# Patient Record
Sex: MALE | Race: WHITE | NOT HISPANIC OR LATINO | Employment: OTHER | URBAN - METROPOLITAN AREA
[De-identification: names, ages, dates, MRNs, and addresses within clinical notes are randomized per-mention and may not be internally consistent; named-entity substitution may affect disease eponyms.]

---

## 2020-06-02 ENCOUNTER — HISTORICAL (OUTPATIENT)
Dept: ADMINISTRATIVE | Facility: HOSPITAL | Age: 45
End: 2020-06-02

## 2020-06-19 ENCOUNTER — HISTORICAL (OUTPATIENT)
Dept: ADMINISTRATIVE | Facility: HOSPITAL | Age: 45
End: 2020-06-19

## 2022-04-07 ENCOUNTER — HISTORICAL (OUTPATIENT)
Dept: ADMINISTRATIVE | Facility: HOSPITAL | Age: 47
End: 2022-04-07

## 2022-04-23 VITALS
WEIGHT: 184.94 LBS | BODY MASS INDEX: 32.77 KG/M2 | DIASTOLIC BLOOD PRESSURE: 76 MMHG | SYSTOLIC BLOOD PRESSURE: 120 MMHG | HEIGHT: 63 IN

## 2023-05-05 ENCOUNTER — HOSPITAL ENCOUNTER (INPATIENT)
Facility: HOSPITAL | Age: 48
LOS: 4 days | Discharge: HOME | DRG: 683 | End: 2023-05-12
Attending: FAMILY MEDICINE | Admitting: INTERNAL MEDICINE
Payer: MEDICARE

## 2023-05-05 DIAGNOSIS — D53.9 MACROCYTIC ANEMIA: ICD-10-CM

## 2023-05-05 DIAGNOSIS — N17.9 AKI (ACUTE KIDNEY INJURY): ICD-10-CM

## 2023-05-05 DIAGNOSIS — D64.9 ANEMIA, UNSPECIFIED TYPE: Primary | ICD-10-CM

## 2023-05-05 LAB
ALBUMIN SERPL-MCNC: 2.9 G/DL (ref 3.5–5)
ALBUMIN/GLOB SERPL: 0.9 RATIO (ref 1.1–2)
ALP SERPL-CCNC: 63 UNIT/L (ref 40–150)
ALT SERPL-CCNC: 28 UNIT/L (ref 0–55)
AMMONIA PLAS-MSCNC: 48.8 UMOL/L (ref 18–72)
APPEARANCE UR: CLEAR
APTT PPP: 30.2 SECONDS (ref 23.4–33.9)
AST SERPL-CCNC: 74 UNIT/L (ref 5–34)
BACTERIA #/AREA URNS AUTO: ABNORMAL /HPF
BASOPHILS # BLD AUTO: 0.01 X10(3)/MCL
BASOPHILS NFR BLD AUTO: 0.2 %
BILIRUB UR QL STRIP.AUTO: NEGATIVE MG/DL
BILIRUBIN DIRECT+TOT PNL SERPL-MCNC: 0.3 MG/DL
BNP BLD-MCNC: 38.6 PG/ML
BUN SERPL-MCNC: 27.9 MG/DL (ref 8.9–20.6)
CALCIUM SERPL-MCNC: 9.5 MG/DL (ref 8.4–10.2)
CHLORIDE SERPL-SCNC: 114 MMOL/L (ref 98–107)
CK SERPL-CCNC: 2035 U/L (ref 30–200)
CO2 SERPL-SCNC: 23 MMOL/L (ref 22–29)
COLOR UR AUTO: YELLOW
CREAT SERPL-MCNC: 2.44 MG/DL (ref 0.73–1.18)
CREAT UR-MCNC: 93.2 MG/DL (ref 63–166)
CREAT UR-MCNC: 95.1 MG/DL (ref 63–166)
DEPRECATED CALCIDIOL+CALCIFEROL SERPL-MC: 85.2 NG/ML (ref 30–80)
EOSINOPHIL # BLD AUTO: 0.14 X10(3)/MCL (ref 0–0.9)
EOSINOPHIL NFR BLD AUTO: 3.1 %
ERYTHROCYTE [DISTWIDTH] IN BLOOD BY AUTOMATED COUNT: 15.9 % (ref 11.5–17)
GFR SERPLBLD CREATININE-BSD FMLA CKD-EPI: 32 MLS/MIN/1.73/M2
GLOBULIN SER-MCNC: 3.2 GM/DL (ref 2.4–3.5)
GLUCOSE SERPL-MCNC: 129 MG/DL (ref 74–100)
GLUCOSE UR QL STRIP.AUTO: 100 MG/DL
HCT VFR BLD AUTO: 28.6 % (ref 42–52)
HEMOCCULT SP1 STL QL: NEGATIVE
HGB BLD-MCNC: 8.8 G/DL (ref 14–18)
IMM GRANULOCYTES # BLD AUTO: 0.01 X10(3)/MCL (ref 0–0.04)
IMM GRANULOCYTES NFR BLD AUTO: 0.2 %
INR BLD: 1.07 (ref 2–3)
IRON SATN MFR SERPL: 16 % (ref 20–50)
IRON SERPL-MCNC: 60 UG/DL (ref 65–175)
KETONES UR QL STRIP.AUTO: NEGATIVE MG/DL
LDH SERPL-CCNC: 266 U/L (ref 125–220)
LEUKOCYTE ESTERASE UR QL STRIP.AUTO: NEGATIVE UNIT/L
LYMPHOCYTES # BLD AUTO: 1.95 X10(3)/MCL (ref 0.6–4.6)
LYMPHOCYTES NFR BLD AUTO: 43.4 %
MCH RBC QN AUTO: 31.8 PG (ref 27–31)
MCHC RBC AUTO-ENTMCNC: 30.8 G/DL (ref 33–36)
MCV RBC AUTO: 103.2 FL (ref 80–94)
MONOCYTES # BLD AUTO: 0.32 X10(3)/MCL (ref 0.1–1.3)
MONOCYTES NFR BLD AUTO: 7.1 %
MUCOUS THREADS URNS QL MICRO: ABNORMAL /LPF
NEUTROPHILS # BLD AUTO: 2.06 X10(3)/MCL (ref 2.1–9.2)
NEUTROPHILS NFR BLD AUTO: 46 %
NITRITE UR QL STRIP.AUTO: NEGATIVE
NRBC BLD AUTO-RTO: 0 %
PH UR STRIP.AUTO: 6 [PH]
PLATELET # BLD AUTO: 228 X10(3)/MCL (ref 130–400)
PLATELETS.RETICULATED NFR BLD AUTO: 1.8 % (ref 0.9–11.2)
PMV BLD AUTO: 9.9 FL (ref 7.4–10.4)
POTASSIUM SERPL-SCNC: 5.3 MMOL/L (ref 3.5–5.1)
PROT SERPL-MCNC: 6.1 GM/DL (ref 6.4–8.3)
PROT UR QL STRIP.AUTO: NEGATIVE MG/DL
PROT UR STRIP-MCNC: 12.4 MG/DL
PROTHROMBIN TIME: 13.7 SECONDS (ref 11.7–14.5)
PTH-INTACT SERPL-MCNC: 41.5 PG/ML (ref 8.7–77)
RBC # BLD AUTO: 2.77 X10(6)/MCL (ref 4.7–6.1)
RBC #/AREA URNS AUTO: ABNORMAL /HPF
RBC UR QL AUTO: ABNORMAL UNIT/L
RET# (OHS): 0.04 (ref 0.03–0.1)
RETICULOCYTE COUNT AUTOMATED (OLG): 1.67 % (ref 1.1–2.1)
SODIUM SERPL-SCNC: 145 MMOL/L (ref 136–145)
SODIUM UR-SCNC: 74 MMOL/L
SP GR UR STRIP.AUTO: 1.01
SQUAMOUS #/AREA URNS AUTO: ABNORMAL /HPF
TIBC SERPL-MCNC: 308 UG/DL (ref 69–240)
TIBC SERPL-MCNC: 368 UG/DL (ref 250–450)
TRANSFERRIN SERPL-MCNC: 329 MG/DL (ref 174–364)
URINE PROTEIN/CREATININE RATIO (OHS): 0.1
UROBILINOGEN UR STRIP-ACNC: 1 MG/DL
WBC # SPEC AUTO: 4.49 X10(3)/MCL (ref 4.5–11.5)
WBC #/AREA URNS AUTO: ABNORMAL /HPF

## 2023-05-05 PROCEDURE — 82728 ASSAY OF FERRITIN: CPT | Performed by: FAMILY MEDICINE

## 2023-05-05 PROCEDURE — 82746 ASSAY OF FOLIC ACID SERUM: CPT | Performed by: FAMILY MEDICINE

## 2023-05-05 PROCEDURE — 81001 URINALYSIS AUTO W/SCOPE: CPT | Performed by: PHYSICIAN ASSISTANT

## 2023-05-05 PROCEDURE — 63600175 PHARM REV CODE 636 W HCPCS: Performed by: INTERNAL MEDICINE

## 2023-05-05 PROCEDURE — 82306 VITAMIN D 25 HYDROXY: CPT | Performed by: INTERNAL MEDICINE

## 2023-05-05 PROCEDURE — 85610 PROTHROMBIN TIME: CPT | Performed by: PHYSICIAN ASSISTANT

## 2023-05-05 PROCEDURE — 83615 LACTATE (LD) (LDH) ENZYME: CPT | Performed by: FAMILY MEDICINE

## 2023-05-05 PROCEDURE — 82270 OCCULT BLOOD FECES: CPT | Performed by: PHYSICIAN ASSISTANT

## 2023-05-05 PROCEDURE — G0378 HOSPITAL OBSERVATION PER HR: HCPCS

## 2023-05-05 PROCEDURE — 25000003 PHARM REV CODE 250: Performed by: INTERNAL MEDICINE

## 2023-05-05 PROCEDURE — 83880 ASSAY OF NATRIURETIC PEPTIDE: CPT | Performed by: PHYSICIAN ASSISTANT

## 2023-05-05 PROCEDURE — 25000003 PHARM REV CODE 250: Performed by: PHYSICIAN ASSISTANT

## 2023-05-05 PROCEDURE — 85045 AUTOMATED RETICULOCYTE COUNT: CPT | Performed by: FAMILY MEDICINE

## 2023-05-05 PROCEDURE — 82140 ASSAY OF AMMONIA: CPT | Performed by: FAMILY MEDICINE

## 2023-05-05 PROCEDURE — 83970 ASSAY OF PARATHORMONE: CPT | Performed by: INTERNAL MEDICINE

## 2023-05-05 PROCEDURE — 99285 EMERGENCY DEPT VISIT HI MDM: CPT | Mod: 25

## 2023-05-05 PROCEDURE — 85025 COMPLETE CBC W/AUTO DIFF WBC: CPT | Performed by: STUDENT IN AN ORGANIZED HEALTH CARE EDUCATION/TRAINING PROGRAM

## 2023-05-05 PROCEDURE — 80053 COMPREHEN METABOLIC PANEL: CPT | Performed by: STUDENT IN AN ORGANIZED HEALTH CARE EDUCATION/TRAINING PROGRAM

## 2023-05-05 PROCEDURE — 82570 ASSAY OF URINE CREATININE: CPT | Performed by: INTERNAL MEDICINE

## 2023-05-05 PROCEDURE — 80074 ACUTE HEPATITIS PANEL: CPT | Performed by: INTERNAL MEDICINE

## 2023-05-05 PROCEDURE — 82570 ASSAY OF URINE CREATININE: CPT | Mod: 91 | Performed by: INTERNAL MEDICINE

## 2023-05-05 PROCEDURE — 83550 IRON BINDING TEST: CPT | Performed by: FAMILY MEDICINE

## 2023-05-05 PROCEDURE — 82607 VITAMIN B-12: CPT | Performed by: FAMILY MEDICINE

## 2023-05-05 PROCEDURE — 87389 HIV-1 AG W/HIV-1&-2 AB AG IA: CPT | Performed by: INTERNAL MEDICINE

## 2023-05-05 PROCEDURE — 85730 THROMBOPLASTIN TIME PARTIAL: CPT | Performed by: PHYSICIAN ASSISTANT

## 2023-05-05 PROCEDURE — 84300 ASSAY OF URINE SODIUM: CPT | Performed by: INTERNAL MEDICINE

## 2023-05-05 PROCEDURE — 86880 COOMBS TEST DIRECT: CPT | Performed by: FAMILY MEDICINE

## 2023-05-05 PROCEDURE — 96360 HYDRATION IV INFUSION INIT: CPT

## 2023-05-05 PROCEDURE — 82550 ASSAY OF CK (CPK): CPT | Performed by: PHYSICIAN ASSISTANT

## 2023-05-05 RX ORDER — SODIUM CHLORIDE 0.9 % (FLUSH) 0.9 %
10 SYRINGE (ML) INJECTION
Status: DISCONTINUED | OUTPATIENT
Start: 2023-05-05 | End: 2023-05-12 | Stop reason: HOSPADM

## 2023-05-05 RX ORDER — FUROSEMIDE 40 MG/1
TABLET ORAL
Status: ON HOLD | COMMUNITY
End: 2023-05-12 | Stop reason: SDUPTHER

## 2023-05-05 RX ORDER — ATORVASTATIN CALCIUM 20 MG/1
TABLET, FILM COATED ORAL
COMMUNITY

## 2023-05-05 RX ORDER — URSODIOL 300 MG/1
CAPSULE ORAL 3 TIMES DAILY
COMMUNITY
Start: 2023-04-19

## 2023-05-05 RX ORDER — SODIUM CHLORIDE, SODIUM LACTATE, POTASSIUM CHLORIDE, CALCIUM CHLORIDE 600; 310; 30; 20 MG/100ML; MG/100ML; MG/100ML; MG/100ML
INJECTION, SOLUTION INTRAVENOUS CONTINUOUS
Status: DISCONTINUED | OUTPATIENT
Start: 2023-05-05 | End: 2023-05-06

## 2023-05-05 RX ORDER — PALIPERIDONE PALMITATE 117 MG/.75ML
INJECTION INTRAMUSCULAR
COMMUNITY

## 2023-05-05 RX ORDER — TALC
6 POWDER (GRAM) TOPICAL NIGHTLY PRN
Status: DISCONTINUED | OUTPATIENT
Start: 2023-05-05 | End: 2023-05-12 | Stop reason: HOSPADM

## 2023-05-05 RX ORDER — ATORVASTATIN CALCIUM 10 MG/1
10 TABLET, FILM COATED ORAL
Status: ON HOLD | COMMUNITY
Start: 2023-04-19 | End: 2023-05-12 | Stop reason: HOSPADM

## 2023-05-05 RX ORDER — ARIPIPRAZOLE 5 MG/1
5 TABLET ORAL
COMMUNITY
Start: 2023-04-19

## 2023-05-05 RX ORDER — LEVOTHYROXINE SODIUM 25 UG/1
25 TABLET ORAL
Status: DISCONTINUED | OUTPATIENT
Start: 2023-05-06 | End: 2023-05-12 | Stop reason: HOSPADM

## 2023-05-05 RX ORDER — FENOFIBRATE 145 MG/1
TABLET, FILM COATED ORAL
COMMUNITY

## 2023-05-05 RX ORDER — METFORMIN HYDROCHLORIDE 1000 MG/1
1000 TABLET ORAL 2 TIMES DAILY
Status: ON HOLD | COMMUNITY
Start: 2023-04-19 | End: 2023-05-12 | Stop reason: HOSPADM

## 2023-05-05 RX ORDER — PANTOPRAZOLE SODIUM 40 MG/1
40 TABLET, DELAYED RELEASE ORAL
COMMUNITY
Start: 2023-04-24

## 2023-05-05 RX ORDER — LEMBOREXANT 5 MG/1
1 TABLET, FILM COATED ORAL
COMMUNITY
Start: 2023-04-19

## 2023-05-05 RX ORDER — GLIPIZIDE 10 MG/1
10 TABLET, FILM COATED, EXTENDED RELEASE ORAL
Status: ON HOLD | COMMUNITY
Start: 2023-04-19 | End: 2023-05-12 | Stop reason: SDUPTHER

## 2023-05-05 RX ORDER — ARIPIPRAZOLE 5 MG/1
5 TABLET ORAL DAILY
Status: DISCONTINUED | OUTPATIENT
Start: 2023-05-06 | End: 2023-05-12 | Stop reason: HOSPADM

## 2023-05-05 RX ORDER — DICLOFENAC SODIUM 75 MG/1
75 TABLET, DELAYED RELEASE ORAL 2 TIMES DAILY
Status: ON HOLD | COMMUNITY
Start: 2023-04-19 | End: 2023-05-12 | Stop reason: HOSPADM

## 2023-05-05 RX ORDER — VORTIOXETINE 20 MG/1
1 TABLET, FILM COATED ORAL
COMMUNITY
Start: 2023-04-19

## 2023-05-05 RX ORDER — ASPIRIN 81 MG/1
81 TABLET ORAL DAILY
Status: DISCONTINUED | OUTPATIENT
Start: 2023-05-06 | End: 2023-05-06

## 2023-05-05 RX ORDER — FENOFIBRATE 145 MG/1
145 TABLET, FILM COATED ORAL DAILY
Status: DISCONTINUED | OUTPATIENT
Start: 2023-05-06 | End: 2023-05-09

## 2023-05-05 RX ORDER — LEVOTHYROXINE SODIUM 25 UG/1
TABLET ORAL
COMMUNITY

## 2023-05-05 RX ORDER — ASPIRIN 81 MG/1
TABLET ORAL
COMMUNITY

## 2023-05-05 RX ADMIN — SODIUM ZIRCONIUM CYCLOSILICATE 10 G: 10 POWDER, FOR SUSPENSION ORAL at 09:05

## 2023-05-05 RX ADMIN — SODIUM CHLORIDE 1000 ML: 9 INJECTION, SOLUTION INTRAVENOUS at 07:05

## 2023-05-05 RX ADMIN — SODIUM CHLORIDE, POTASSIUM CHLORIDE, SODIUM LACTATE AND CALCIUM CHLORIDE: 600; 310; 30; 20 INJECTION, SOLUTION INTRAVENOUS at 09:05

## 2023-05-05 NOTE — Clinical Note
Diagnosis: Anemia, unspecified type [[id number]]  Future Attending Provider: RUBÉN VASQUEZ [[id number]]  Admitting Provider:: RUBÉN AVSQUEZ [[id number]]
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
## 2023-05-06 LAB
ALBUMIN SERPL-MCNC: 2.7 G/DL (ref 3.5–5)
ALBUMIN/GLOB SERPL: 0.9 RATIO (ref 1.1–2)
ALP SERPL-CCNC: 54 UNIT/L (ref 40–150)
ALT SERPL-CCNC: 27 UNIT/L (ref 0–55)
AST SERPL-CCNC: 74 UNIT/L (ref 5–34)
BASOPHILS # BLD AUTO: 0.01 X10(3)/MCL
BASOPHILS NFR BLD AUTO: 0.2 %
BILIRUBIN DIRECT+TOT PNL SERPL-MCNC: 0.3 MG/DL
BUN SERPL-MCNC: 28.3 MG/DL (ref 8.9–20.6)
CALCIUM SERPL-MCNC: 8.8 MG/DL (ref 8.4–10.2)
CHLORIDE SERPL-SCNC: 113 MMOL/L (ref 98–107)
CK SERPL-CCNC: 1631 U/L (ref 30–200)
CO2 SERPL-SCNC: 26 MMOL/L (ref 22–29)
CREAT SERPL-MCNC: 2.16 MG/DL (ref 0.73–1.18)
DIRECT COOMBS (DAT): NEGATIVE
EOSINOPHIL # BLD AUTO: 0.11 X10(3)/MCL (ref 0–0.9)
EOSINOPHIL NFR BLD AUTO: 2.5 %
ERYTHROCYTE [DISTWIDTH] IN BLOOD BY AUTOMATED COUNT: 16.1 % (ref 11.5–17)
FERRITIN SERPL-MCNC: 220.69 NG/ML (ref 21.81–274.66)
FOLATE SERPL-MCNC: 6.3 NG/ML (ref 7–31.4)
GFR SERPLBLD CREATININE-BSD FMLA CKD-EPI: 38 MLS/MIN/1.73/M2
GGT SERPL-CCNC: 16 U/L (ref 12–64)
GLOBULIN SER-MCNC: 2.9 GM/DL (ref 2.4–3.5)
GLUCOSE SERPL-MCNC: 46 MG/DL (ref 74–100)
HAV IGM SERPL QL IA: NONREACTIVE
HBV CORE IGM SERPL QL IA: NONREACTIVE
HBV SURFACE AG SERPL QL IA: NONREACTIVE
HCT VFR BLD AUTO: 26.9 % (ref 42–52)
HCV AB SERPL QL IA: NONREACTIVE
HGB BLD-MCNC: 8.1 G/DL (ref 14–18)
HIV 1+2 AB+HIV1 P24 AG SERPL QL IA: NONREACTIVE
IMM GRANULOCYTES # BLD AUTO: 0.01 X10(3)/MCL (ref 0–0.04)
IMM GRANULOCYTES NFR BLD AUTO: 0.2 %
LYMPHOCYTES # BLD AUTO: 2.09 X10(3)/MCL (ref 0.6–4.6)
LYMPHOCYTES NFR BLD AUTO: 46.9 %
MAGNESIUM SERPL-MCNC: 1.5 MG/DL (ref 1.6–2.6)
MCH RBC QN AUTO: 31.5 PG (ref 27–31)
MCHC RBC AUTO-ENTMCNC: 30.1 G/DL (ref 33–36)
MCV RBC AUTO: 104.7 FL (ref 80–94)
MONOCYTES # BLD AUTO: 0.31 X10(3)/MCL (ref 0.1–1.3)
MONOCYTES NFR BLD AUTO: 7 %
NEUTROPHILS # BLD AUTO: 1.93 X10(3)/MCL (ref 2.1–9.2)
NEUTROPHILS NFR BLD AUTO: 43.2 %
NRBC BLD AUTO-RTO: 0 %
PHOSPHATE SERPL-MCNC: 3.7 MG/DL (ref 2.3–4.7)
PLATELET # BLD AUTO: 234 X10(3)/MCL (ref 130–400)
PMV BLD AUTO: 9.4 FL (ref 7.4–10.4)
POCT GLUCOSE: 147 MG/DL (ref 70–110)
POCT GLUCOSE: 147 MG/DL (ref 70–110)
POCT GLUCOSE: 227 MG/DL (ref 70–110)
POCT GLUCOSE: 57 MG/DL (ref 70–110)
POCT GLUCOSE: 81 MG/DL (ref 70–110)
POCT GLUCOSE: 84 MG/DL (ref 70–110)
POTASSIUM SERPL-SCNC: 4.5 MMOL/L (ref 3.5–5.1)
PROT SERPL-MCNC: 5.6 GM/DL (ref 6.4–8.3)
RBC # BLD AUTO: 2.57 X10(6)/MCL (ref 4.7–6.1)
SODIUM SERPL-SCNC: 147 MMOL/L (ref 136–145)
T4 FREE SERPL-MCNC: 0.76 NG/DL (ref 0.7–1.48)
TSH SERPL-ACNC: 0.74 UIU/ML (ref 0.35–4.94)
VIT B12 SERPL-MCNC: 309 PG/ML (ref 213–816)
WBC # SPEC AUTO: 4.46 X10(3)/MCL (ref 4.5–11.5)

## 2023-05-06 PROCEDURE — 25000003 PHARM REV CODE 250: Performed by: INTERNAL MEDICINE

## 2023-05-06 PROCEDURE — 83735 ASSAY OF MAGNESIUM: CPT | Performed by: INTERNAL MEDICINE

## 2023-05-06 PROCEDURE — G0378 HOSPITAL OBSERVATION PER HR: HCPCS

## 2023-05-06 PROCEDURE — 84439 ASSAY OF FREE THYROXINE: CPT | Performed by: INTERNAL MEDICINE

## 2023-05-06 PROCEDURE — 82977 ASSAY OF GGT: CPT | Performed by: INTERNAL MEDICINE

## 2023-05-06 PROCEDURE — 96372 THER/PROPH/DIAG INJ SC/IM: CPT | Performed by: INTERNAL MEDICINE

## 2023-05-06 PROCEDURE — 82550 ASSAY OF CK (CPK): CPT | Performed by: INTERNAL MEDICINE

## 2023-05-06 PROCEDURE — 84100 ASSAY OF PHOSPHORUS: CPT | Performed by: INTERNAL MEDICINE

## 2023-05-06 PROCEDURE — 80053 COMPREHEN METABOLIC PANEL: CPT | Performed by: INTERNAL MEDICINE

## 2023-05-06 PROCEDURE — 85025 COMPLETE CBC W/AUTO DIFF WBC: CPT | Performed by: INTERNAL MEDICINE

## 2023-05-06 PROCEDURE — 63600175 PHARM REV CODE 636 W HCPCS: Performed by: INTERNAL MEDICINE

## 2023-05-06 PROCEDURE — 84443 ASSAY THYROID STIM HORMONE: CPT | Performed by: INTERNAL MEDICINE

## 2023-05-06 RX ORDER — DEXTROSE MONOHYDRATE 50 MG/ML
INJECTION, SOLUTION INTRAVENOUS CONTINUOUS
Status: DISCONTINUED | OUTPATIENT
Start: 2023-05-07 | End: 2023-05-07

## 2023-05-06 RX ORDER — IBUPROFEN 200 MG
16 TABLET ORAL
Status: DISCONTINUED | OUTPATIENT
Start: 2023-05-06 | End: 2023-05-12 | Stop reason: HOSPADM

## 2023-05-06 RX ORDER — IBUPROFEN 200 MG
16 TABLET ORAL
Status: DISCONTINUED | OUTPATIENT
Start: 2023-05-06 | End: 2023-05-06

## 2023-05-06 RX ORDER — CYANOCOBALAMIN 1000 UG/ML
1000 INJECTION, SOLUTION INTRAMUSCULAR; SUBCUTANEOUS ONCE
Status: COMPLETED | OUTPATIENT
Start: 2023-05-06 | End: 2023-05-06

## 2023-05-06 RX ORDER — IBUPROFEN 200 MG
24 TABLET ORAL
Status: DISCONTINUED | OUTPATIENT
Start: 2023-05-06 | End: 2023-05-06

## 2023-05-06 RX ORDER — ALFUZOSIN HYDROCHLORIDE 10 MG/1
10 TABLET, EXTENDED RELEASE ORAL
COMMUNITY

## 2023-05-06 RX ORDER — LANOLIN ALCOHOL/MO/W.PET/CERES
1 CREAM (GRAM) TOPICAL DAILY
Status: DISCONTINUED | OUTPATIENT
Start: 2023-05-06 | End: 2023-05-12 | Stop reason: HOSPADM

## 2023-05-06 RX ORDER — FOLIC ACID 1 MG/1
1 TABLET ORAL DAILY
Status: DISCONTINUED | OUTPATIENT
Start: 2023-05-06 | End: 2023-05-12 | Stop reason: HOSPADM

## 2023-05-06 RX ORDER — INSULIN ASPART 100 [IU]/ML
0-5 INJECTION, SOLUTION INTRAVENOUS; SUBCUTANEOUS
Status: DISCONTINUED | OUTPATIENT
Start: 2023-05-06 | End: 2023-05-12 | Stop reason: HOSPADM

## 2023-05-06 RX ORDER — DIVALPROEX SODIUM 125 MG/1
125 TABLET, DELAYED RELEASE ORAL NIGHTLY
COMMUNITY

## 2023-05-06 RX ORDER — GLUCAGON 1 MG
1 KIT INJECTION
Status: DISCONTINUED | OUTPATIENT
Start: 2023-05-06 | End: 2023-05-12 | Stop reason: HOSPADM

## 2023-05-06 RX ORDER — IBUPROFEN 200 MG
24 TABLET ORAL
Status: DISCONTINUED | OUTPATIENT
Start: 2023-05-06 | End: 2023-05-12 | Stop reason: HOSPADM

## 2023-05-06 RX ADMIN — FERROUS SULFATE TAB 325 MG (65 MG ELEMENTAL FE) 1 EACH: 325 (65 FE) TAB at 09:05

## 2023-05-06 RX ADMIN — CYANOCOBALAMIN 1000 MCG: 1000 INJECTION, SOLUTION INTRAMUSCULAR; SUBCUTANEOUS at 05:05

## 2023-05-06 RX ADMIN — LEVOTHYROXINE SODIUM 25 MCG: 25 TABLET ORAL at 05:05

## 2023-05-06 RX ADMIN — SODIUM CHLORIDE, POTASSIUM CHLORIDE, SODIUM LACTATE AND CALCIUM CHLORIDE: 600; 310; 30; 20 INJECTION, SOLUTION INTRAVENOUS at 09:05

## 2023-05-06 RX ADMIN — FENOFIBRATE 145 MG: 145 TABLET ORAL at 09:05

## 2023-05-06 RX ADMIN — ARIPIPRAZOLE 5 MG: 5 TABLET ORAL at 09:05

## 2023-05-06 RX ADMIN — FOLIC ACID 1 MG: 1 TABLET ORAL at 09:05

## 2023-05-06 NOTE — NURSING
I spoke with Pankaj @ [facility]. He was able to send me a face sheet and MAR. Pt home meds have been updated. MD notified.     [facility] [phone]

## 2023-05-06 NOTE — ED NOTES
Denies complaints.  Sent here from [facility] for medical evaulation

## 2023-05-06 NOTE — H&P
[facility] - Emergency Dept    History & Physical      Patient Name: Bacilio Arguelles  MRN: [id number]  Admission Date: 5/5/2023  Attending Physician:  Sudeep Burns MD  Primary Care Provider: Malik Zavala MD         Patient information was obtained from patient and ER records.     Subjective:     Principal Problem:Renal failure    Chief Complaint: Abnormal lab    Chief Complaint   Patient presents with    abnormal labs     Pt to er for evaluation of elevated kidney function levels and swelling.        HPI: Mr Arguelles presented to the ER from [facility] facility due to labs showing renal failure.  History is very limited due to his developmental delay.  He is cooperative with no report of any aggressive behavior.  He has multiple medical problems including hyperlipidemia, diabetes, epilepsy, hypothyroidism, GERD, anxiety, depression, intellectual disability and mood disorder.  He typically lives in a group home prior to admission to [facility].  By report his creatinine was around 3 on admission to [facility] and  is 2.4 tonight.  No report of any trouble urinating.  No known prior history of renal disease.      Past Medical History:   Diagnosis Date    Anxiety disorder, unspecified     Depression     DM (diabetes mellitus), type 2     GERD (gastroesophageal reflux disease)     HLD (hyperlipidemia)     Hypothyroidism, unspecified     Intellectual disability     Mood disorder     Seizure disorder        Past Surgical History:   Procedure Laterality Date    ARTHROPLASTY,HIP,TOTAL, BILATERAL, POSTERIOR APPROACH Bilateral        Review of patient's allergies indicates:  No Known Allergies    No current facility-administered medications on file prior to encounter.     Current Outpatient Medications on File Prior to Encounter   Medication Sig    ARIPiprazole (ABILIFY) 5 MG Tab Take 5 mg by mouth.    aspirin (ECOTRIN) 81 MG EC tablet Aspir-81 mg tablet,delayed release   Take 1  tablet every day by oral route.    atorvastatin (LIPITOR) 10 MG tablet Take 10 mg by mouth.    atorvastatin (LIPITOR) 20 MG tablet atorvastatin 20 mg tablet    DAYVIGO 5 mg Tab Take 1 tablet by mouth.    diclofenac (VOLTAREN) 75 MG EC tablet Take 75 mg by mouth 2 (two) times daily.    fenofibrate (TRICOR) 145 MG tablet fenofibrate nanocrystallized 145 mg tablet    furosemide (LASIX) 40 MG tablet furosemide 40 mg tablet    glipiZIDE (GLUCOTROL) 10 MG TR24 Take 10 mg by mouth.    levothyroxine (SYNTHROID) 25 MCG tablet levothyroxine 25 mcg tablet    metFORMIN (GLUCOPHAGE) 1000 MG tablet Take 1,000 mg by mouth 2 (two) times daily.    paliperidone palmitate (INVEGA SUSTENNA) 117 mg/0.75 mL Syrg injection Invega Sustenna 117 mg/0.75 mL intramuscular syringe    pantoprazole (PROTONIX) 40 MG tablet Take 40 mg by mouth.    TRINTELLIX 20 mg Tab Take 1 tablet by mouth.    ursodioL (ACTIGALL) 300 mg capsule Take by mouth.     Family History    None       Tobacco Use    Smoking status: Not on file    Smokeless tobacco: Not on file   Substance and Sexual Activity    Alcohol use: Not on file    Drug use: Not on file    Sexual activity: Not on file     Review of Systems   Unable to perform ROS: Psychiatric disorder (Mental status)     Objective:     Vital Signs (Most Recent):  Temp: 97.9 °F (36.6 °C) (05/05/23 1821)  Pulse: 92 (05/05/23 1821)  Resp: 20 (05/05/23 1821)  BP: (!) 151/86 (05/05/23 1821)  SpO2: 96 % (05/05/23 1821) Vital Signs (24h Range):  Temp:  [97.9 °F (36.6 °C)] 97.9 °F (36.6 °C)  Pulse:  [92] 92  Resp:  [20] 20  SpO2:  [96 %] 96 %  BP: (151)/(86) 151/86     Weight: 81.6 kg (180 lb)  Body mass index is 30.9 kg/m².    Physical Exam  Constitutional:       General: He is not in acute distress.  HENT:      Head: Normocephalic and atraumatic.   Eyes:      General: No scleral icterus.     Extraocular Movements: Extraocular movements intact.   Cardiovascular:      Rate and Rhythm: Normal rate and regular rhythm.    Pulmonary:      Effort: Pulmonary effort is normal. No respiratory distress.   Abdominal:      General: There is no distension.      Palpations: Abdomen is soft.   Musculoskeletal:         General: No swelling. Normal range of motion.      Cervical back: Normal range of motion and neck supple.   Skin:     General: Skin is dry.      Coloration: Skin is not jaundiced.   Neurological:      General: No focal deficit present.      Mental Status: He is alert.      Motor: No weakness.   Psychiatric:         Mood and Affect: Mood normal.         Behavior: Behavior normal.          Significant Labs: All pertinent labs within the past 24 hours have been reviewed.  A1C: No results for input(s): HGBA1C in the last 4320 hours.  ABGs: No results for input(s): PH, PCO2, HCO3, POCSATURATED, BE, TOTALHB, COHB, METHB, O2HB, POCFIO2, PO2 in the last 48 hours.  Bilirubin:   Recent Labs   Lab 05/05/23 1901   BILITOT 0.3     Blood Culture: No results for input(s): LABBLOO in the last 48 hours.  BMP:   Recent Labs   Lab 05/05/23 1901      K 5.3*   CO2 23   BUN 27.9*   CREATININE 2.44*   CALCIUM 9.5     CBC:   Recent Labs   Lab 05/05/23 1901   WBC 4.49*   HGB 8.8*   HCT 28.6*        CMP:   Recent Labs   Lab 05/05/23 1901      K 5.3*   CO2 23   BUN 27.9*   CREATININE 2.44*   CALCIUM 9.5   ALBUMIN 2.9*   BILITOT 0.3   ALKPHOS 63   AST 74*   ALT 28     Cardiac Markers:   Recent Labs   Lab 05/05/23 1901   BNP 38.6     Coagulation:   Recent Labs   Lab 05/05/23 2021   INR 1.07*     Lactic Acid: No results for input(s): LACTATE in the last 48 hours.  Lipase: No results for input(s): LIPASE in the last 48 hours.  Lipid Panel: No results for input(s): CHOL, HDL, LDLCALC, TRIG, CHOLHDL in the last 48 hours.  Magnesium: No results for input(s): MG in the last 48 hours.  Pathology Results  (Last 10 years)      None          POCT Glucose: No results for input(s): POCTGLUCOSE in the last 48 hours.  Prealbumin: No results for  input(s): PREALBUMIN in the last 48 hours.  Respiratory Culture: No results for input(s): GSRESP, RESPIRATORYC in the last 48 hours.  Troponin: No results for input(s): TROPONINI, TROPONINIHS in the last 48 hours.  TSH: No results for input(s): TSH in the last 4320 hours.  Urine Culture: No results for input(s): LABURIN in the last 48 hours.  Urine Studies:   Recent Labs   Lab 05/05/23  1901   APPEARANCEUA Clear   PROTEINUA Negative   BILIRUBINUA Negative   UROBILINOGEN 1.0   LEUKOCYTESUR Negative   RBCUA 0-2   WBCUA 0-2   BACTERIA Trace*     Recent Lab Results         05/05/23 2056 05/05/23 2021 05/05/23  1901        Immature Platelet Fraction     1.8       Albumin/Globulin Ratio     0.9       Albumin     2.9       Alkaline Phosphatase     63       ALT     28       Ammonia   48.8         Appearance, UA     Clear       aPTT   30.2  Comment: For Minimal Heparin Infusion, the goal aPTT 64-85 seconds corresponds to an anti-Xa of 0.3-0.5.    For Low Intensity and High Intensity Heparin, the goal aPTT  seconds corresponds to an anti-Xa of 0.3-0.7         AST     74       Bacteria, UA     Trace       Baso #     0.01       Basophil %     0.2       BILIRUBIN TOTAL     0.3       Bilirubin, UA     Negative       BNP     38.6       BUN     27.9       Calcium     9.5       Chloride     114       CO2     23       Color, UA     Yellow       CPK     2,035       Creatinine     2.44       eGFR     32       Eos #     0.14       Eosinophil %     3.1       Globulin, Total     3.2       Glucose     129       Glucose, UA     100       Hematocrit     28.6       Hemoglobin     8.8       Immature Grans (Abs)     0.01       Immature Granulocytes     0.2       INR   1.07         Ketones, UA     Negative       Leukocytes, UA     Negative       Lymph #     1.95       LYMPH %     43.4       MCH     31.8       MCHC     30.8       MCV     103.2       Mono #     0.32       Mono %     7.1       MPV     9.9       Mucous, UA     Small        Neut #     2.06       Neut %     46.0       NITRITE UA     Negative       nRBC     0.0       Occult Blood   Negative         Occult Blood UA     Trace       pH, UA     6.0       Platelets     228       Potassium     5.3       PROTEIN TOTAL     6.1       Protein, UA     Negative       Protime   13.7         RBC     2.77       RBC, UA     0-2       RDW     15.9       Retic     1.67       Retic Count Abs     0.0443       Sodium     145       Specific Gravity,UA     1.010       Squam Epithel, UA     Few       Urobilinogen, UA     1.0       Vit D, 25-Hydroxy 85.2           WBC, UA     0-2       WBC     4.49               Significant Imaging: I have reviewed all pertinent imaging results/findings within the past 24 hours.    Assessment/Plan:     1 - Acute renal failure: unknown baseline but no reported history of renal disease.  Renal dose medications as indicated, IV fluids, repeat BMP in AM    2 - Hyperkalemia: very mild, IV fluids, check AM BMP    3 - Anemia: likely due to renal disease, follow clinically for now    4 - DM type 2: hold metformin, ADA diet, SSI    5 - Mood disorder: continue current psychiatric medications    6 - Rhabdomyolysis: IV fluids, recheck CPK in AM    VTE Risk Mitigation (From admission, onward)           Ordered     IP VTE HIGH RISK PATIENT  Once         05/05/23 2044     Place sequential compression device  Until discontinued         05/05/23 2044                  This Encounter was via Telemedicine from [place], [place]  Patient was located in [place], [place]    Full code    Time spent 5/5/23 was 50 minutes    Sudeep Burns JR, MD  Department of Hospital Medicine   [facility] - Emergency Dept

## 2023-05-06 NOTE — NURSING
Nurses Note -- 4 Eyes      5/6/2023   4:59 PM      Skin assessed during: Admit      [x] No Altered Skin Integrity Present    []Prevention Measures Documented      [] Yes- Altered Skin Integrity Present or Discovered   [] LDA Added if Not in Epic (Describe Wound)   [] New Altered Skin Integrity was Present on Admit and Documented in LDA   [] Wound Image Taken    Wound Care Consulted? No    Attending Nurse:  Zoila Hidalgo LPN     Second RN/Staff Member:  Neema Danielson RN

## 2023-05-07 LAB
ALBUMIN SERPL-MCNC: 2.8 G/DL (ref 3.5–5)
ALBUMIN/GLOB SERPL: 1.1 RATIO (ref 1.1–2)
ALP SERPL-CCNC: 68 UNIT/L (ref 40–150)
ALT SERPL-CCNC: 33 UNIT/L (ref 0–55)
AST SERPL-CCNC: 67 UNIT/L (ref 5–34)
BASOPHILS # BLD AUTO: 0.01 X10(3)/MCL
BASOPHILS NFR BLD AUTO: 0.2 %
BILIRUBIN DIRECT+TOT PNL SERPL-MCNC: 0.2 MG/DL
BUN SERPL-MCNC: 29.1 MG/DL (ref 8.9–20.6)
CALCIUM SERPL-MCNC: 9.3 MG/DL (ref 8.4–10.2)
CHLORIDE SERPL-SCNC: 111 MMOL/L (ref 98–107)
CO2 SERPL-SCNC: 25 MMOL/L (ref 22–29)
CREAT SERPL-MCNC: 2.23 MG/DL (ref 0.73–1.18)
EOSINOPHIL # BLD AUTO: 0.12 X10(3)/MCL (ref 0–0.9)
EOSINOPHIL NFR BLD AUTO: 2.7 %
ERYTHROCYTE [DISTWIDTH] IN BLOOD BY AUTOMATED COUNT: 16.2 % (ref 11.5–17)
FERRITIN SERPL-MCNC: 214.42 NG/ML (ref 21.81–274.66)
GFR SERPLBLD CREATININE-BSD FMLA CKD-EPI: 36 MLS/MIN/1.73/M2
GLOBULIN SER-MCNC: 2.6 GM/DL (ref 2.4–3.5)
GLUCOSE SERPL-MCNC: 163 MG/DL (ref 74–100)
HCT VFR BLD AUTO: 26.8 % (ref 42–52)
HGB BLD-MCNC: 8 G/DL (ref 14–18)
IMM GRANULOCYTES # BLD AUTO: 0.02 X10(3)/MCL (ref 0–0.04)
IMM GRANULOCYTES NFR BLD AUTO: 0.4 %
IRON SATN MFR SERPL: 18 % (ref 20–50)
IRON SERPL-MCNC: 62 UG/DL (ref 65–175)
LYMPHOCYTES # BLD AUTO: 2.21 X10(3)/MCL (ref 0.6–4.6)
LYMPHOCYTES NFR BLD AUTO: 49.4 %
MCH RBC QN AUTO: 31.1 PG (ref 27–31)
MCHC RBC AUTO-ENTMCNC: 29.9 G/DL (ref 33–36)
MCV RBC AUTO: 104.3 FL (ref 80–94)
MONOCYTES # BLD AUTO: 0.36 X10(3)/MCL (ref 0.1–1.3)
MONOCYTES NFR BLD AUTO: 8.1 %
NEUTROPHILS # BLD AUTO: 1.75 X10(3)/MCL (ref 2.1–9.2)
NEUTROPHILS NFR BLD AUTO: 39.2 %
NRBC BLD AUTO-RTO: 0 %
PLATELET # BLD AUTO: 224 X10(3)/MCL (ref 130–400)
PMV BLD AUTO: 9.4 FL (ref 7.4–10.4)
POCT GLUCOSE: 123 MG/DL (ref 70–110)
POCT GLUCOSE: 141 MG/DL (ref 70–110)
POCT GLUCOSE: 205 MG/DL (ref 70–110)
POTASSIUM SERPL-SCNC: 4.8 MMOL/L (ref 3.5–5.1)
PROT SERPL-MCNC: 5.4 GM/DL (ref 6.4–8.3)
RBC # BLD AUTO: 2.57 X10(6)/MCL (ref 4.7–6.1)
SODIUM SERPL-SCNC: 143 MMOL/L (ref 136–145)
TIBC SERPL-MCNC: 288 UG/DL (ref 69–240)
TIBC SERPL-MCNC: 350 UG/DL (ref 250–450)
TRANSFERRIN SERPL-MCNC: 309 MG/DL (ref 174–364)
WBC # SPEC AUTO: 4.47 X10(3)/MCL (ref 4.5–11.5)

## 2023-05-07 PROCEDURE — 80053 COMPREHEN METABOLIC PANEL: CPT | Performed by: INTERNAL MEDICINE

## 2023-05-07 PROCEDURE — G0378 HOSPITAL OBSERVATION PER HR: HCPCS

## 2023-05-07 PROCEDURE — 25000003 PHARM REV CODE 250: Performed by: INTERNAL MEDICINE

## 2023-05-07 PROCEDURE — 85025 COMPLETE CBC W/AUTO DIFF WBC: CPT | Performed by: INTERNAL MEDICINE

## 2023-05-07 PROCEDURE — 96372 THER/PROPH/DIAG INJ SC/IM: CPT | Performed by: INTERNAL MEDICINE

## 2023-05-07 PROCEDURE — 63600175 PHARM REV CODE 636 W HCPCS: Performed by: INTERNAL MEDICINE

## 2023-05-07 PROCEDURE — 82728 ASSAY OF FERRITIN: CPT | Performed by: INTERNAL MEDICINE

## 2023-05-07 PROCEDURE — 83550 IRON BINDING TEST: CPT | Performed by: INTERNAL MEDICINE

## 2023-05-07 RX ORDER — DIVALPROEX SODIUM 125 MG/1
125 TABLET, DELAYED RELEASE ORAL NIGHTLY
Status: DISCONTINUED | OUTPATIENT
Start: 2023-05-07 | End: 2023-05-12 | Stop reason: HOSPADM

## 2023-05-07 RX ORDER — TAMSULOSIN HYDROCHLORIDE 0.4 MG/1
0.4 CAPSULE ORAL DAILY
Status: DISCONTINUED | OUTPATIENT
Start: 2023-05-07 | End: 2023-05-12 | Stop reason: HOSPADM

## 2023-05-07 RX ORDER — SODIUM CHLORIDE 9 MG/ML
INJECTION, SOLUTION INTRAVENOUS CONTINUOUS
Status: DISCONTINUED | OUTPATIENT
Start: 2023-05-07 | End: 2023-05-09

## 2023-05-07 RX ORDER — SODIUM CHLORIDE, SODIUM LACTATE, POTASSIUM CHLORIDE, CALCIUM CHLORIDE 600; 310; 30; 20 MG/100ML; MG/100ML; MG/100ML; MG/100ML
INJECTION, SOLUTION INTRAVENOUS CONTINUOUS
Status: DISCONTINUED | OUTPATIENT
Start: 2023-05-07 | End: 2023-05-07

## 2023-05-07 RX ADMIN — TAMSULOSIN HYDROCHLORIDE 0.4 MG: 0.4 CAPSULE ORAL at 01:05

## 2023-05-07 RX ADMIN — FERROUS SULFATE TAB 325 MG (65 MG ELEMENTAL FE) 1 EACH: 325 (65 FE) TAB at 10:05

## 2023-05-07 RX ADMIN — FENOFIBRATE 145 MG: 145 TABLET ORAL at 10:05

## 2023-05-07 RX ADMIN — INSULIN ASPART 1 UNITS: 100 INJECTION, SOLUTION INTRAVENOUS; SUBCUTANEOUS at 08:05

## 2023-05-07 RX ADMIN — ARIPIPRAZOLE 5 MG: 5 TABLET ORAL at 10:05

## 2023-05-07 RX ADMIN — FOLIC ACID 1 MG: 1 TABLET ORAL at 10:05

## 2023-05-07 RX ADMIN — LEVOTHYROXINE SODIUM 25 MCG: 25 TABLET ORAL at 06:05

## 2023-05-07 RX ADMIN — SODIUM CHLORIDE: 9 INJECTION, SOLUTION INTRAVENOUS at 03:05

## 2023-05-07 RX ADMIN — SODIUM CHLORIDE, POTASSIUM CHLORIDE, SODIUM LACTATE AND CALCIUM CHLORIDE: 600; 310; 30; 20 INJECTION, SOLUTION INTRAVENOUS at 12:05

## 2023-05-07 RX ADMIN — SODIUM CHLORIDE, POTASSIUM CHLORIDE, SODIUM LACTATE AND CALCIUM CHLORIDE: 600; 310; 30; 20 INJECTION, SOLUTION INTRAVENOUS at 06:05
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
## 2023-05-07 NOTE — PROGRESS NOTES
[facility] Hospital Medicine Progress Note        Chief Complaint: Inpatient Follow-up for ARF/Rhabdo    HPI:   Mr Arguelles presented to the ER from [facility] facility due to labs showing renal failure.  History is very limited due to his developmental delay.  He is cooperative with no report of any aggressive behavior.  He has multiple medical problems including hyperlipidemia, diabetes, epilepsy, hypothyroidism, GERD, anxiety, depression, intellectual disability and mood disorder.  He typically lives in a group home prior to admission to [facility].  By report his creatinine was around 3 on admission to [facility] and  is 2.4 tonight.  No report of any trouble urinating.  No known prior history of renal disease.   Patient was started on iv fluids and renal functions closely monitored.     Interval Hx:   Patient today awake and comfortable. Denies any nausea, vomiting or diarrhea. Has urine out put. He is in a good mood. No family at bedside. Advised to drink plenty of fluids.     Objective/physical exam:  General: In no acute distress, afebrile  Chest: Clear to auscultation bilaterally  Heart: RRR, +S1, S2, no appreciable murmur  Abdomen: Soft, nontender, BS +  MSK: Warm, no lower extremity edema, no clubbing or cyanosis  Neurologic: Alert and oriented x4, Cranial nerve II-XII intact, Strength 5/5 in all 4 extremities    VITAL SIGNS: 24 HRS MIN & MAX LAST   Temp  Min: 98 °F (36.7 °C)  Max: 98.5 °F (36.9 °C) 98.5 °F (36.9 °C)   BP  Min: 118/76  Max: 143/87 133/87   Pulse  Min: 72  Max: 88  88   Resp  Min: 18  Max: 18 18   SpO2  Min: 97 %  Max: 100 % 98 %       Recent Labs   Lab 05/05/23  1901 05/06/23  0620 05/07/23  0356   WBC 4.49* 4.46* 4.47*   RBC 2.77* 2.57* 2.57*   HGB 8.8* 8.1* 8.0*   HCT 28.6* 26.9* 26.8*   .2* 104.7* 104.3*   MCH 31.8* 31.5* 31.1*   MCHC 30.8* 30.1* 29.9*   RDW 15.9 16.1 16.2    234 224   MPV 9.9 9.4 9.4       Recent Labs    Lab 05/05/23  1901 05/06/23  0620 05/07/23  0356    147* 143   K 5.3* 4.5 4.8   CO2 23 26 25   BUN 27.9* 28.3* 29.1*   CREATININE 2.44* 2.16* 2.23*   CALCIUM 9.5 8.8 9.3   MG  --  1.50*  --    ALBUMIN 2.9* 2.7* 2.8*   ALKPHOS 63 54 68   ALT 28 27 33   AST 74* 74* 67*   BILITOT 0.3 0.3 0.2          Microbiology Results (last 7 days)       ** No results found for the last 168 hours. **             See below for Radiology    Scheduled Med:   ARIPiprazole  5 mg Oral Daily    divalproex  125 mg Oral QHS    fenofibrate  145 mg Oral Daily    ferrous sulfate  1 tablet Oral Daily    folic acid  1 mg Oral Daily    levothyroxine  25 mcg Oral Before breakfast    tamsulosin  0.4 mg Oral Daily        Continuous Infusions:   sodium chloride 0.9%          PRN Meds:  dextrose 10%, glucagon (human recombinant), glucose, glucose, insulin aspart U-100, melatonin, sodium chloride 0.9%       Assessment/Plan:  CARMEL with rhabdomyolysis unclear etiology  Mild hyperkalemia-improved  Anemia  Diabetes Mellitus Type 2  Mood disorder with developmental delay  H/O Seizures     Plan:  Patient has no new issues. Has been afebrile  States he is urinating well  Tolerating po diet. Encouraged to drink plenty of fluids   Cont IV fluids   Will closely monitor patients daily weight, urine out put, renal parameters and volume status    Home meds reviewed and he was on daily NSAIDs, this could be the cause of his ARF  Also he is on anti-seizure meds. He could have had a seizure and developed rhabdo  So far no seizure like episodes noticed    Hb low today, could be dilutional. Will monitor     Current meds and home meds reviewed    Reviewed today's labs and WBC 4.47, Hb 8, Plt 224, Na 143, BUN 29, crt 2.23    Cont sliding scale, sugars controlled     Labs in am     US kidneys normal         VTE prophylaxis: SCDS    Patient condition:  Fair    Anticipated discharge and Disposition:   Home       All diagnosis and differential diagnosis have been  reviewed; assessment and plan has been documented; I have personally reviewed the labs and test results that are presently available; I have reviewed the patients medication list; I have reviewed the consulting providers response and recommendations. I have reviewed or attempted to review medical records based upon their availability    All of the patient's questions have been  addressed and answered. Patient's is agreeable to the above stated plan. I will continue to monitor closely and make adjustments to medical management as needed.  _____________________________________________________________________    Nutrition Status:    Radiology:  US Retroperitoneal Complete  Narrative: EXAMINATION:  US RETROPERITONEAL COMPLETE    CLINICAL HISTORY:  CARMEL;    COMPARISON:  None.    FINDINGS:  Grayscale and color Doppler sonographic evaluation of the kidneys and urinary bladder.    Poor acoustic windows for the kidneys.  The right kidney measures 10 cm. The left kidney measures 10 cm.   No hydronephrosis.  Grossly normal renal parenchymal echogenicity.    No significant abnormality of the urinary bladder.  Impression: No significant sonographic abnormality of the kidneys.    Electronically signed by: Sudeep Edgar  Date:    05/06/2023  Time:    17:55      Otis Muñzo MD   05/07/2023

## 2023-05-07 NOTE — PLAN OF CARE
Problem: Adult Inpatient Plan of Care  Goal: Plan of Care Review  5/7/2023 1157 by Brandon Lockwood RN  Outcome: Ongoing, Progressing  5/7/2023 1157 by Brandon Lockwood RN  Outcome: Ongoing, Progressing  Goal: Patient-Specific Goal (Individualized)  5/7/2023 1157 by Brandon Lockwood RN  Outcome: Ongoing, Progressing  5/7/2023 1157 by Brandon Lockwood RN  Outcome: Ongoing, Progressing  Goal: Absence of Hospital-Acquired Illness or Injury  5/7/2023 1157 by Brandon Lockwood RN  Outcome: Ongoing, Progressing  5/7/2023 1157 by Brandon Lockwood RN  Outcome: Ongoing, Progressing  Goal: Optimal Comfort and Wellbeing  5/7/2023 1157 by Brandon Lockwood RN  Outcome: Ongoing, Progressing  5/7/2023 1157 by Brandon Lockwood RN  Outcome: Ongoing, Progressing  Goal: Readiness for Transition of Care  5/7/2023 1157 by Brandon Lockwood RN  Outcome: Ongoing, Progressing  5/7/2023 1157 by Brandon Lockwood RN  Outcome: Ongoing, Progressing     Problem: Anemia  Goal: Anemia Symptom Improvement  5/7/2023 1157 by Brandon Lockwood RN  Outcome: Ongoing, Progressing  5/7/2023 1157 by Brandon Lockwood RN  Outcome: Ongoing, Progressing     Problem: Fall Injury Risk  Goal: Absence of Fall and Fall-Related Injury  5/7/2023 1157 by Brandon Lockwood RN  Outcome: Ongoing, Progressing  5/7/2023 1157 by Brandon Lockwood RN  Outcome: Ongoing, Progressing

## 2023-05-08 LAB
ANION GAP SERPL CALC-SCNC: 5 MEQ/L
BASOPHILS # BLD AUTO: 0.04 X10(3)/MCL
BASOPHILS NFR BLD AUTO: 0.8 %
BUN SERPL-MCNC: 23.8 MG/DL (ref 8.9–20.6)
CALCIUM SERPL-MCNC: 8.8 MG/DL (ref 8.4–10.2)
CHLORIDE SERPL-SCNC: 115 MMOL/L (ref 98–107)
CK SERPL-CCNC: 856 U/L (ref 30–200)
CO2 SERPL-SCNC: 20 MMOL/L (ref 22–29)
CREAT SERPL-MCNC: 2.01 MG/DL (ref 0.73–1.18)
CREAT/UREA NIT SERPL: 12
EOSINOPHIL # BLD AUTO: 0.18 X10(3)/MCL (ref 0–0.9)
EOSINOPHIL NFR BLD AUTO: 3.7 %
ERYTHROCYTE [DISTWIDTH] IN BLOOD BY AUTOMATED COUNT: 16.1 % (ref 11.5–17)
GFR SERPLBLD CREATININE-BSD FMLA CKD-EPI: 41 MLS/MIN/1.73/M2
GLUCOSE SERPL-MCNC: 125 MG/DL (ref 74–100)
HCT VFR BLD AUTO: 25.1 % (ref 42–52)
HGB BLD-MCNC: 8 G/DL (ref 14–18)
IMM GRANULOCYTES # BLD AUTO: 0.08 X10(3)/MCL (ref 0–0.04)
IMM GRANULOCYTES NFR BLD AUTO: 1.6 %
LYMPHOCYTES # BLD AUTO: 1.97 X10(3)/MCL (ref 0.6–4.6)
LYMPHOCYTES NFR BLD AUTO: 40.2 %
MCH RBC QN AUTO: 32.4 PG (ref 27–31)
MCHC RBC AUTO-ENTMCNC: 31.9 G/DL (ref 33–36)
MCV RBC AUTO: 101.6 FL (ref 80–94)
MONOCYTES # BLD AUTO: 0.37 X10(3)/MCL (ref 0.1–1.3)
MONOCYTES NFR BLD AUTO: 7.6 %
NEUTROPHILS # BLD AUTO: 2.26 X10(3)/MCL (ref 2.1–9.2)
NEUTROPHILS NFR BLD AUTO: 46.1 %
NRBC BLD AUTO-RTO: 0 %
PLATELET # BLD AUTO: 184 X10(3)/MCL (ref 130–400)
PMV BLD AUTO: 11.5 FL (ref 7.4–10.4)
POCT GLUCOSE: 111 MG/DL (ref 70–110)
POTASSIUM SERPL-SCNC: 5.7 MMOL/L (ref 3.5–5.1)
RBC # BLD AUTO: 2.47 X10(6)/MCL (ref 4.7–6.1)
SODIUM SERPL-SCNC: 140 MMOL/L (ref 136–145)
WBC # SPEC AUTO: 4.9 X10(3)/MCL (ref 4.5–11.5)

## 2023-05-08 PROCEDURE — 25000003 PHARM REV CODE 250: Performed by: INTERNAL MEDICINE

## 2023-05-08 PROCEDURE — 85025 COMPLETE CBC W/AUTO DIFF WBC: CPT | Performed by: INTERNAL MEDICINE

## 2023-05-08 PROCEDURE — 82550 ASSAY OF CK (CPK): CPT | Performed by: INTERNAL MEDICINE

## 2023-05-08 PROCEDURE — 80048 BASIC METABOLIC PNL TOTAL CA: CPT | Performed by: INTERNAL MEDICINE

## 2023-05-08 PROCEDURE — 63600175 PHARM REV CODE 636 W HCPCS: Performed by: INTERNAL MEDICINE

## 2023-05-08 PROCEDURE — 11000001 HC ACUTE MED/SURG PRIVATE ROOM

## 2023-05-08 RX ADMIN — TAMSULOSIN HYDROCHLORIDE 0.4 MG: 0.4 CAPSULE ORAL at 08:05

## 2023-05-08 RX ADMIN — SODIUM CHLORIDE: 9 INJECTION, SOLUTION INTRAVENOUS at 09:05

## 2023-05-08 RX ADMIN — FERROUS SULFATE TAB 325 MG (65 MG ELEMENTAL FE) 1 EACH: 325 (65 FE) TAB at 08:05

## 2023-05-08 RX ADMIN — INSULIN ASPART 2 UNITS: 100 INJECTION, SOLUTION INTRAVENOUS; SUBCUTANEOUS at 05:05

## 2023-05-08 RX ADMIN — ARIPIPRAZOLE 5 MG: 5 TABLET ORAL at 08:05

## 2023-05-08 RX ADMIN — FOLIC ACID 1 MG: 1 TABLET ORAL at 08:05

## 2023-05-08 RX ADMIN — LEVOTHYROXINE SODIUM 25 MCG: 25 TABLET ORAL at 05:05

## 2023-05-08 RX ADMIN — SODIUM ZIRCONIUM CYCLOSILICATE 10 G: 10 POWDER, FOR SUSPENSION ORAL at 03:05

## 2023-05-08 RX ADMIN — FENOFIBRATE 145 MG: 145 TABLET ORAL at 08:05

## 2023-05-08 RX ADMIN — SODIUM CHLORIDE: 9 INJECTION, SOLUTION INTRAVENOUS at 03:05

## 2023-05-08 NOTE — PLAN OF CARE
23 1729   Discharge Assessment   Confirmed/corrected address, phone number and insurance No   Source of Information   ([facility]andi worker at Group Home # [phone])   Reason For Admission Dehydration and CARMEL   People in Home facility resident   Facility Arrived From: [facility] in [facility]# [phone]   Do you expect to return to your current living situation? Yes   Home Layout Able to live on 1st floor   Who is going to help you get home at discharge? Lives in Group Home   How do you get to doctors appointments? agency   Discharge Plan A Group Home   Discharge Plan discussed with: Caregiver   Name(s) and Number(s) Madni   Discharge Barriers Identified None     Numbers on face sheet not working - called # [phone] and that is [facility] in [place].  Was able to speak with Mandi who is a caregiver at the home.  She states the pt has been with them for 3 years now and was placed by his father, who since has  last year.  She states he does have fmly that are involved:  His sister Delphine ph# [phone]  makes the decisions for him and his mother Radha ph# [phone].  Renetta is the nurse at the home and can be contacted upon d/c.

## 2023-05-08 NOTE — PROGRESS NOTES
[facility] Hospital Medicine Progress Note        Chief Complaint: Inpatient Follow-up for ARF/Rhabdo    HPI:   Mr Arguelles presented to the ER from [facility] facility due to labs showing renal failure.  History is very limited due to his developmental delay.  He is cooperative with no report of any aggressive behavior.  He has multiple medical problems including hyperlipidemia, diabetes, epilepsy, hypothyroidism, GERD, anxiety, depression, intellectual disability and mood disorder.  He typically lives in a group home prior to admission to [facility].  By report his creatinine was around 3 on admission to [facility] and  is 2.4 tonight.  No report of any trouble urinating.  No known prior history of renal disease.   Patient was started on iv fluids and renal functions closely monitored.     Interval Hx:   Patient today awake and comfortable. Has good urine out put, No fever or chills. Eating well.     Objective/physical exam:  General: In no acute distress, afebrile  Chest: Clear to auscultation bilaterally  Heart: RRR, +S1, S2, no appreciable murmur  Abdomen: Soft, nontender, BS +  MSK: Warm, no lower extremity edema, no clubbing or cyanosis  Neurologic: Alert and oriented x4, Cranial nerve II-XII intact, Strength 5/5 in all 4 extremities    VITAL SIGNS: 24 HRS MIN & MAX LAST   Temp  Min: 97.9 °F (36.6 °C)  Max: 99.2 °F (37.3 °C) 99.2 °F (37.3 °C)   BP  Min: 122/70  Max: 150/95 122/70   Pulse  Min: 82  Max: 101  101   Resp  Min: 16  Max: 18 18   SpO2  Min: 96 %  Max: 100 % 97 %       Recent Labs   Lab 05/06/23  0620 05/07/23  0356 05/08/23  0648   WBC 4.46* 4.47* 4.90   RBC 2.57* 2.57* 2.47*   HGB 8.1* 8.0* 8.0*   HCT 26.9* 26.8* 25.1*   .7* 104.3* 101.6*   MCH 31.5* 31.1* 32.4*   MCHC 30.1* 29.9* 31.9*   RDW 16.1 16.2 16.1    224 184   MPV 9.4 9.4 11.5*       Recent Labs   Lab 05/05/23  1901 05/06/23  0620 05/07/23  0356 05/08/23  0648     147* 143 140   K 5.3* 4.5 4.8 5.7*   CO2 23 26 25 20*   BUN 27.9* 28.3* 29.1* 23.8*   CREATININE 2.44* 2.16* 2.23* 2.01*   CALCIUM 9.5 8.8 9.3 8.8   MG  --  1.50*  --   --    ALBUMIN 2.9* 2.7* 2.8*  --    ALKPHOS 63 54 68  --    ALT 28 27 33  --    AST 74* 74* 67*  --    BILITOT 0.3 0.3 0.2  --           Microbiology Results (last 7 days)       ** No results found for the last 168 hours. **             See below for Radiology    Scheduled Med:   ARIPiprazole  5 mg Oral Daily    divalproex  125 mg Oral QHS    fenofibrate  145 mg Oral Daily    ferrous sulfate  1 tablet Oral Daily    folic acid  1 mg Oral Daily    levothyroxine  25 mcg Oral Before breakfast    tamsulosin  0.4 mg Oral Daily        Continuous Infusions:   sodium chloride 0.9% 150 mL/hr at 05/08/23 0342        PRN Meds:  dextrose 10%, glucagon (human recombinant), glucose, glucose, insulin aspart U-100, melatonin, sodium chloride 0.9%       Assessment/Plan:  CARMEL with rhabdomyolysis unclear etiology  Mild hyperkalemia-improved  Anemia  Hyperkalemia   Diabetes Mellitus Type 2  Mood disorder with developmental delay  H/O Seizures     Plan:  Patient doing well and has no acute issues  Has good urine out put.   Tolerating po diet. Encouraged to drink plenty of fluids   Cont IV fluids   Will closely monitor patients daily weight, urine out put, renal parameters and volume status    Home meds reviewed and he was on daily NSAIDs, this could be the cause of his ARF  Also he is on anti-seizure meds. He could have had a seizure and developed rhabdo  So far no seizure like episodes noticed    Hb low but stable    Current meds and home meds reviewed    Reviewed today's labs and WBC 4.90, Hb 8, Plt 184, Na 140, K 5.7, Bicarb 20, BUN 23,  crt 2.01    Since there is major improvement in renal function we will consult renal team for further recommendations     Lokelma 10 gm x 1 today     Cont sliding scale, sugars controlled     Labs in am     US kidneys normal          VTE prophylaxis: SCDS    Patient condition:  Fair    Anticipated discharge and Disposition:   Home       All diagnosis and differential diagnosis have been reviewed; assessment and plan has been documented; I have personally reviewed the labs and test results that are presently available; I have reviewed the patients medication list; I have reviewed the consulting providers response and recommendations. I have reviewed or attempted to review medical records based upon their availability    All of the patient's questions have been  addressed and answered. Patient's is agreeable to the above stated plan. I will continue to monitor closely and make adjustments to medical management as needed.  _____________________________________________________________________    Nutrition Status:    Radiology:  US Retroperitoneal Complete  Narrative: EXAMINATION:  US RETROPERITONEAL COMPLETE    CLINICAL HISTORY:  CARMEL;    COMPARISON:  None.    FINDINGS:  Grayscale and color Doppler sonographic evaluation of the kidneys and urinary bladder.    Poor acoustic windows for the kidneys.  The right kidney measures 10 cm. The left kidney measures 10 cm.   No hydronephrosis.  Grossly normal renal parenchymal echogenicity.    No significant abnormality of the urinary bladder.  Impression: No significant sonographic abnormality of the kidneys.    Electronically signed by: Sudeep Edgar  Date:    05/06/2023  Time:    17:55      Otis Muñoz MD   05/08/2023

## 2023-05-09 LAB
ANION GAP SERPL CALC-SCNC: 5 MEQ/L
BASOPHILS # BLD AUTO: 0.02 X10(3)/MCL
BASOPHILS NFR BLD AUTO: 0.4 %
BUN SERPL-MCNC: 28.7 MG/DL (ref 8.9–20.6)
CALCIUM SERPL-MCNC: 9 MG/DL (ref 8.4–10.2)
CHLORIDE SERPL-SCNC: 118 MMOL/L (ref 98–107)
CK SERPL-CCNC: 443 U/L (ref 30–200)
CO2 SERPL-SCNC: 20 MMOL/L (ref 22–29)
CREAT SERPL-MCNC: 2.08 MG/DL (ref 0.73–1.18)
CREAT/UREA NIT SERPL: 14
EOSINOPHIL # BLD AUTO: 0.17 X10(3)/MCL (ref 0–0.9)
EOSINOPHIL NFR BLD AUTO: 3.3 %
ERYTHROCYTE [DISTWIDTH] IN BLOOD BY AUTOMATED COUNT: 16.2 % (ref 11.5–17)
EST. AVERAGE GLUCOSE BLD GHB EST-MCNC: 116.9 MG/DL
GFR SERPLBLD CREATININE-BSD FMLA CKD-EPI: 39 MLS/MIN/1.73/M2
GLUCOSE SERPL-MCNC: 177 MG/DL (ref 74–100)
HBA1C MFR BLD: 5.7 %
HCT VFR BLD AUTO: 25.1 % (ref 42–52)
HGB BLD-MCNC: 7.6 G/DL (ref 14–18)
IMM GRANULOCYTES # BLD AUTO: 0.02 X10(3)/MCL (ref 0–0.04)
IMM GRANULOCYTES NFR BLD AUTO: 0.4 %
LYMPHOCYTES # BLD AUTO: 2.11 X10(3)/MCL (ref 0.6–4.6)
LYMPHOCYTES NFR BLD AUTO: 41 %
MCH RBC QN AUTO: 32.1 PG (ref 27–31)
MCHC RBC AUTO-ENTMCNC: 30.3 G/DL (ref 33–36)
MCV RBC AUTO: 105.9 FL (ref 80–94)
MONOCYTES # BLD AUTO: 0.4 X10(3)/MCL (ref 0.1–1.3)
MONOCYTES NFR BLD AUTO: 7.8 %
NEUTROPHILS # BLD AUTO: 2.43 X10(3)/MCL (ref 2.1–9.2)
NEUTROPHILS NFR BLD AUTO: 47.1 %
NRBC BLD AUTO-RTO: 0 %
PATH REV: NORMAL
PLATELET # BLD AUTO: 211 X10(3)/MCL (ref 130–400)
PMV BLD AUTO: 9.3 FL (ref 7.4–10.4)
POCT GLUCOSE: 178 MG/DL (ref 70–110)
POCT GLUCOSE: 213 MG/DL (ref 70–110)
POCT GLUCOSE: 238 MG/DL (ref 70–110)
POTASSIUM SERPL-SCNC: 5.1 MMOL/L (ref 3.5–5.1)
RBC # BLD AUTO: 2.37 X10(6)/MCL (ref 4.7–6.1)
SODIUM SERPL-SCNC: 143 MMOL/L (ref 136–145)
URATE SERPL-MCNC: 3.1 MG/DL (ref 3.5–7.2)
WBC # SPEC AUTO: 5.15 X10(3)/MCL (ref 4.5–11.5)

## 2023-05-09 PROCEDURE — 25000003 PHARM REV CODE 250: Performed by: INTERNAL MEDICINE

## 2023-05-09 PROCEDURE — 80048 BASIC METABOLIC PNL TOTAL CA: CPT | Performed by: INTERNAL MEDICINE

## 2023-05-09 PROCEDURE — 82550 ASSAY OF CK (CPK): CPT | Performed by: INTERNAL MEDICINE

## 2023-05-09 PROCEDURE — 63600175 PHARM REV CODE 636 W HCPCS: Performed by: INTERNAL MEDICINE

## 2023-05-09 PROCEDURE — 11000001 HC ACUTE MED/SURG PRIVATE ROOM

## 2023-05-09 PROCEDURE — 85025 COMPLETE CBC W/AUTO DIFF WBC: CPT | Performed by: INTERNAL MEDICINE

## 2023-05-09 PROCEDURE — 83036 HEMOGLOBIN GLYCOSYLATED A1C: CPT | Performed by: INTERNAL MEDICINE

## 2023-05-09 PROCEDURE — 84550 ASSAY OF BLOOD/URIC ACID: CPT | Performed by: INTERNAL MEDICINE

## 2023-05-09 RX ORDER — GLIPIZIDE 5 MG/1
5 TABLET, FILM COATED, EXTENDED RELEASE ORAL
Status: DISCONTINUED | OUTPATIENT
Start: 2023-05-10 | End: 2023-05-12 | Stop reason: HOSPADM

## 2023-05-09 RX ADMIN — LEVOTHYROXINE SODIUM 25 MCG: 25 TABLET ORAL at 06:05

## 2023-05-09 RX ADMIN — FENOFIBRATE 145 MG: 145 TABLET ORAL at 09:05

## 2023-05-09 RX ADMIN — FOLIC ACID 1 MG: 1 TABLET ORAL at 09:05

## 2023-05-09 RX ADMIN — ARIPIPRAZOLE 5 MG: 5 TABLET ORAL at 09:05

## 2023-05-09 RX ADMIN — SODIUM CHLORIDE 125 MG: 9 INJECTION, SOLUTION INTRAVENOUS at 02:05

## 2023-05-09 RX ADMIN — TAMSULOSIN HYDROCHLORIDE 0.4 MG: 0.4 CAPSULE ORAL at 09:05

## 2023-05-09 RX ADMIN — FERROUS SULFATE TAB 325 MG (65 MG ELEMENTAL FE) 1 EACH: 325 (65 FE) TAB at 09:05

## 2023-05-09 NOTE — PLAN OF CARE
Rec'd call from Renetta QUILES that cares for pt at [facility]- she wanted an update and gave fax # [phone] for d/c info to be sent at time of d/c.  Renetta also states we can contact on her cell# [phone] as needed.

## 2023-05-09 NOTE — PLAN OF CARE
Oral explanation of MOON provided to patient via call to room 546 ([phone]) on 5/8/2023 at 16:21. Patient was given an opportunity to ask questions related to MURPHY. All questions answered. Patient verbalized understanding of MURPHY. A copy of the discussed MOON form was emailed to SRI Bonilla, to deliver to patient's room for signature.

## 2023-05-09 NOTE — PROGRESS NOTES
[facility] Hospital Medicine Progress Note        Chief Complaint: Inpatient Follow-up for ARF/Rhabdo    HPI:   Mr Arguelles presented to the ER from [facility] facility due to labs showing renal failure.  History is very limited due to his developmental delay.  He is cooperative with no report of any aggressive behavior.  He has multiple medical problems including hyperlipidemia, diabetes, epilepsy, hypothyroidism, GERD, anxiety, depression, intellectual disability and mood disorder.  He typically lives in a group home prior to admission to [facility].  By report his creatinine was around 3 on admission to [facility] and  is 2.4 tonight.  No report of any trouble urinating.  No known prior history of renal disease.   Patient was started on iv fluids and renal functions closely monitored.     Interval Hx:   Patient today awake and comfortable. In a good mood. Has no new issues. Afebrile. Good appetite.     Objective/physical exam:  General: In no acute distress, afebrile  Chest: Clear to auscultation bilaterally  Heart: RRR, +S1, S2, no appreciable murmur  Abdomen: Soft, nontender, BS +  MSK: Warm, no lower extremity edema, no clubbing or cyanosis  Neurologic: Alert and oriented x4, Cranial nerve II-XII intact, Strength 5/5 in all 4 extremities    VITAL SIGNS: 24 HRS MIN & MAX LAST   Temp  Min: 97.5 °F (36.4 °C)  Max: 99.2 °F (37.3 °C) 98.4 °F (36.9 °C)   BP  Min: 122/70  Max: 150/91 138/82   Pulse  Min: 79  Max: 101  91   Resp  Min: 14  Max: 18 16   SpO2  Min: 95 %  Max: 97 % 97 %       Recent Labs   Lab 05/07/23  0356 05/08/23  0648 05/09/23  0609   WBC 4.47* 4.90 5.15   RBC 2.57* 2.47* 2.37*   HGB 8.0* 8.0* 7.6*   HCT 26.8* 25.1* 25.1*   .3* 101.6* 105.9*   MCH 31.1* 32.4* 32.1*   MCHC 29.9* 31.9* 30.3*   RDW 16.2 16.1 16.2    184 211   MPV 9.4 11.5* 9.3       Recent Labs   Lab 05/05/23  1901 05/06/23  0620 05/07/23  0356 05/08/23  0648  05/09/23  0609    147* 143 140 143   K 5.3* 4.5 4.8 5.7* 5.1   CO2 23 26 25 20* 20*   BUN 27.9* 28.3* 29.1* 23.8* 28.7*   CREATININE 2.44* 2.16* 2.23* 2.01* 2.08*   CALCIUM 9.5 8.8 9.3 8.8 9.0   MG  --  1.50*  --   --   --    ALBUMIN 2.9* 2.7* 2.8*  --   --    ALKPHOS 63 54 68  --   --    ALT 28 27 33  --   --    AST 74* 74* 67*  --   --    BILITOT 0.3 0.3 0.2  --   --           Microbiology Results (last 7 days)       ** No results found for the last 168 hours. **             See below for Radiology    Scheduled Med:   ARIPiprazole  5 mg Oral Daily    divalproex  125 mg Oral QHS    ferric gluconate (FERRLECIT) IVPB  125 mg Intravenous Once    [START ON 5/10/2023] ferric gluconate (FERRLECIT) IVPB  125 mg Intravenous Once    ferrous sulfate  1 tablet Oral Daily    folic acid  1 mg Oral Daily    [START ON 5/10/2023] glipiZIDE  5 mg Oral Daily with breakfast    levothyroxine  25 mcg Oral Before breakfast    tamsulosin  0.4 mg Oral Daily        Continuous Infusions:         PRN Meds:  dextrose 10%, glucagon (human recombinant), glucose, glucose, insulin aspart U-100, melatonin, sodium chloride 0.9%       Assessment/Plan:  CARMEL with rhabdomyolysis unclear etiology  Mild hyperkalemia-improved  Anemia  Hyperkalemia   Diabetes Mellitus Type 2  Mood disorder with developmental delay  H/O Seizures     Plan:  Patient continues to be in a good mood and eating well.   Has good urine out put.   Seen by renal team and iv fluids has been stopped. Renal functions will be monitored     Will closely monitor patients daily weight, urine out put, renal parameters and volume status      Home meds reviewed and he was on daily NSAIDs, this could be the cause of his ARF  Also he is on anti-seizure meds. He could have had a seizure and developed rhabdo  So far no seizure like episodes noticed    Hb low 7.6, prob dilutional. Will colsely monitor     Current meds and home meds reviewed    Reviewed today's labs and BUN 28,  crt 2.08, K  5.1      Cont sliding scale, sugars controlled. Will start on glucotrol 5 mg po daily (home meds was 10 mg)    Labs in am     US kidneys normal         VTE prophylaxis: SCDS    Patient condition:  Fair    Anticipated discharge and Disposition:   Home       All diagnosis and differential diagnosis have been reviewed; assessment and plan has been documented; I have personally reviewed the labs and test results that are presently available; I have reviewed the patients medication list; I have reviewed the consulting providers response and recommendations. I have reviewed or attempted to review medical records based upon their availability    All of the patient's questions have been  addressed and answered. Patient's is agreeable to the above stated plan. I will continue to monitor closely and make adjustments to medical management as needed.  _____________________________________________________________________    Nutrition Status:    Radiology:  US Retroperitoneal Complete  Narrative: EXAMINATION:  US RETROPERITONEAL COMPLETE    CLINICAL HISTORY:  CARMEL;    COMPARISON:  None.    FINDINGS:  Grayscale and color Doppler sonographic evaluation of the kidneys and urinary bladder.    Poor acoustic windows for the kidneys.  The right kidney measures 10 cm. The left kidney measures 10 cm.   No hydronephrosis.  Grossly normal renal parenchymal echogenicity.    No significant abnormality of the urinary bladder.  Impression: No significant sonographic abnormality of the kidneys.    Electronically signed by: Sudeep Edgar  Date:    05/06/2023  Time:    17:55      Otis Muñoz MD   05/09/2023

## 2023-05-09 NOTE — CONSULTS
[facility]                       [address] [id number]    PATIENT NAME:       ADRIAN MOLINA  YOB: 1975  CSN:                [id number]   MRN:                [id number]  ADMIT DATE:         05/05/2023 18:10:00  PHYSICIAN:          Nimco Sarmiento MD                            CONSULTATION    DATE OF CONSULT:  05/09/2023 00:00:00    REASON FOR CONSULTATION:  Acute kidney injury and rhabdomyolysis evaluation.    HISTORY:  This 47 years old male who lives in a group home and has some   developmental delay.  He was in [facility] for some psych   problem.  He was sent to the hospital for abnormal kidney functions and elevated   CPK levels.  There is no history of any trauma or fall or injury.    The patient reports that he does not have any kidney problems or any kidney   infections or kidney stones or hesitancy, frequency, urgency.  Denies any pedal   edema.  Denies chest pains.  Denies abdominal pains.  Denies cough, cold,   congestion, fever, chills, headache, blurring, diplopia.  No anorexia.  No   weight loss.  No problem swallowing.    PAST MEDICAL HISTORY:  Positive for diabetes mellitus type 2, hyperlipidemia,   hypothyroidism, developmental delay, intellectual disability, mood disorder,   seizure disorder.    SURGICAL HISTORY:  Positive for arthroplasty, bilateral total hip.    ALLERGIES:  NO KNOWN DRUG ALLERGIES.     FAMILY HISTORY:  Not obtainable.    SOCIAL HISTORY:  Negative for smoking, alcoholism, or drug abuse.    REVIEW OF SYSTEMS:  All 12-point review of system was done with this patient as he is quite awake,   alert, and answers questions appropriately and all is negative except one in   history of present illness.    PHYSICAL EXAMINATION:  GENERAL:  On examination, he is very much awake, alert, follows commands.    Answers questions.    HEENT:  Atraumatic,  normocephalic.  Pupils reactive.  No oral lesion.  NECK:  Supple.  No JVD.  No lymphadenopathy.  No thyromegaly.  LUNGS:  Clear to auscultation.  HEART:  Without rub or gallop.    ABDOMEN:  Soft, obese, nontender.  Bowel sounds positive.  No organomegaly.  No   rebound or guarding.  EXTREMITIES:  He does have about 1+ edema.  NEUROLOGIC:  No focal motor deficit noted.    LABORATORY DATA:  Labs revealed on him, CBC, white blood cell count 5.15,   hemoglobin 7.6, platelet 211.  Iron study shows iron saturation 18.  Folic acid   was 6.3.  Spot urine protein and creatinine did not reveal significant   proteinuria, but urinalysis did reveal proteins and now it was negative.    Chemistries revealed serum sodium 143, potassium 5.1, chloride 118, bicarb 20,   BUN 28.7, creatinine 2.08.      Ultrasound of the kidneys revealed both kidneys 10 cm each.  No hydronephrosis.    IMPRESSION:    1. Acute kidney injury, unknown etiology.  Needs workup.  So far, he does have   some high blood pressure, hyperlipidemia, and diabetes mellitus type 2.  No   significant proteinuria noted.  2. Anemia of iron deficiency.  3. Folic acid deficiency.  4. Hypertension.  5. Diabetes mellitus, type 2.  6. Hyperlipidemia.  7. Hyperkalemia, metabolic acidosis secondary to acute kidney injury.  8. Elevated CPKs, probably secondary to mild rhabdomyolysis.    of unknown etiology.    RECOMMENDATION:    1. It is noted that he is on NSAIDs at the group home.  It needs to be   discontinued.  We will send the orders that he should not get any NSAIDs or   Patterson-2 inhibitors.  2. We will discontinue fenofibrate.  At sometime, it can cause CARMEL.  3. We will discontinue IV fluids as he is able to drink oral fluids and eat very   well.  4. Repeat urinalysis and spot urine protein and creatinine again for tomorrow.  5. We will recheck hemoglobin A1c to see how and what medicine he needs to   continue as an outpatient in the group home.  6. The patient is maintaining  low serum albumin of unknown etiology, so a repeat   spot urine protein will be of help also.    Thank you for this consultation.    ADDENDUM    We will give him some Ferrlecit today and tomorrow for iron deficiency.        ______________________________  MD MAYRA Jain/RORY  DD:  05/09/2023  Time:  09:49AM  DT:  05/09/2023  Time:  01:07PM  Job #:  [id number]      CONSULTATION

## 2023-05-10 LAB
ABO + RH BLD: NORMAL
ABORH RETYPE: NORMAL
ALBUMIN SERPL-MCNC: 2.9 G/DL (ref 3.5–5)
ALBUMIN/GLOB SERPL: 1.1 RATIO (ref 1.1–2)
ALP SERPL-CCNC: 69 UNIT/L (ref 40–150)
ALT SERPL-CCNC: 35 UNIT/L (ref 0–55)
APPEARANCE UR: CLEAR
AST SERPL-CCNC: 38 UNIT/L (ref 5–34)
BACTERIA #/AREA URNS AUTO: NORMAL /HPF
BASOPHILS # BLD AUTO: 0.02 X10(3)/MCL
BASOPHILS NFR BLD AUTO: 0.4 %
BILIRUB UR QL STRIP.AUTO: NEGATIVE MG/DL
BILIRUBIN DIRECT+TOT PNL SERPL-MCNC: 0.3 MG/DL
BLD PROD TYP BPU: NORMAL
BLOOD UNIT EXPIRATION DATE: NORMAL
BLOOD UNIT TYPE CODE: 5100
BUN SERPL-MCNC: 27.8 MG/DL (ref 8.9–20.6)
CALCIUM SERPL-MCNC: 9.4 MG/DL (ref 8.4–10.2)
CHLORIDE SERPL-SCNC: 112 MMOL/L (ref 98–107)
CHOLEST SERPL-MCNC: 132 MG/DL
CHOLEST/HDLC SERPL: 8 {RATIO} (ref 0–5)
CK SERPL-CCNC: 349 U/L (ref 30–200)
CO2 SERPL-SCNC: 21 MMOL/L (ref 22–29)
COLOR UR AUTO: YELLOW
CREAT SERPL-MCNC: 2.01 MG/DL (ref 0.73–1.18)
CREAT UR-MCNC: 30.7 MG/DL (ref 63–166)
CROSSMATCH INTERPRETATION: NORMAL
DISPENSE STATUS: NORMAL
EOSINOPHIL # BLD AUTO: 0.14 X10(3)/MCL (ref 0–0.9)
EOSINOPHIL NFR BLD AUTO: 2.5 %
ERYTHROCYTE [DISTWIDTH] IN BLOOD BY AUTOMATED COUNT: 16.2 % (ref 11.5–17)
GFR SERPLBLD CREATININE-BSD FMLA CKD-EPI: 41 MLS/MIN/1.73/M2
GLOBULIN SER-MCNC: 2.6 GM/DL (ref 2.4–3.5)
GLUCOSE SERPL-MCNC: 172 MG/DL (ref 74–100)
GLUCOSE UR QL STRIP.AUTO: ABNORMAL MG/DL
GROUP & RH: NORMAL
HAPTOGLOB SERPL-MCNC: 220 MG/DL (ref 14–258)
HCT VFR BLD AUTO: 24 % (ref 42–52)
HDLC SERPL-MCNC: 17 MG/DL (ref 35–60)
HEMATOLOGIST REVIEW: NORMAL
HGB BLD-MCNC: 7.3 G/DL (ref 14–18)
IMM GRANULOCYTES # BLD AUTO: 0.03 X10(3)/MCL (ref 0–0.04)
IMM GRANULOCYTES NFR BLD AUTO: 0.5 %
INDIRECT COOMBS GEL: NORMAL
KETONES UR QL STRIP.AUTO: NEGATIVE MG/DL
LDH SERPL-CCNC: 274 U/L (ref 125–220)
LDLC SERPL CALC-MCNC: 61 MG/DL (ref 50–140)
LEUKOCYTE ESTERASE UR QL STRIP.AUTO: NEGATIVE UNIT/L
LYMPHOCYTES # BLD AUTO: 2.11 X10(3)/MCL (ref 0.6–4.6)
LYMPHOCYTES NFR BLD AUTO: 38.2 %
MAGNESIUM SERPL-MCNC: 1.2 MG/DL (ref 1.6–2.6)
MCH RBC QN AUTO: 31.7 PG (ref 27–31)
MCHC RBC AUTO-ENTMCNC: 30.4 G/DL (ref 33–36)
MCV RBC AUTO: 104.3 FL (ref 80–94)
MONOCYTES # BLD AUTO: 0.42 X10(3)/MCL (ref 0.1–1.3)
MONOCYTES NFR BLD AUTO: 7.6 %
NEUTROPHILS # BLD AUTO: 2.81 X10(3)/MCL (ref 2.1–9.2)
NEUTROPHILS NFR BLD AUTO: 50.8 %
NITRITE UR QL STRIP.AUTO: NEGATIVE
NRBC BLD AUTO-RTO: 0 %
PH UR STRIP.AUTO: 6.5 [PH]
PHOSPHATE SERPL-MCNC: 3.7 MG/DL (ref 2.3–4.7)
PLATELET # BLD AUTO: 220 X10(3)/MCL (ref 130–400)
PMV BLD AUTO: 9.8 FL (ref 7.4–10.4)
POTASSIUM SERPL-SCNC: 5 MMOL/L (ref 3.5–5.1)
PROT SERPL-MCNC: 5.5 GM/DL (ref 6.4–8.3)
PROT UR QL STRIP.AUTO: NEGATIVE MG/DL
PROT UR STRIP-MCNC: <6.8 MG/DL
RBC # BLD AUTO: 2.3 X10(6)/MCL (ref 4.7–6.1)
RBC #/AREA URNS AUTO: <5 /HPF
RBC UR QL AUTO: NEGATIVE UNIT/L
RET# (OHS): 0.07 (ref 0.03–0.1)
RETICULOCYTE COUNT AUTOMATED (OLG): 2.67 % (ref 1.1–2.1)
SODIUM SERPL-SCNC: 139 MMOL/L (ref 136–145)
SP GR UR STRIP.AUTO: 1.01 (ref 1–1.03)
SPECIMEN OUTDATE: NORMAL
SQUAMOUS #/AREA URNS AUTO: <5 /HPF
TRIGL SERPL-MCNC: 270 MG/DL (ref 34–140)
UNIT NUMBER: NORMAL
UROBILINOGEN UR STRIP-ACNC: 0.2 MG/DL
VLDLC SERPL CALC-MCNC: 54 MG/DL
WBC # SPEC AUTO: 5.53 X10(3)/MCL (ref 4.5–11.5)
WBC #/AREA URNS AUTO: <5 /HPF

## 2023-05-10 PROCEDURE — 36430 TRANSFUSION BLD/BLD COMPNT: CPT

## 2023-05-10 PROCEDURE — 80053 COMPREHEN METABOLIC PANEL: CPT | Performed by: INTERNAL MEDICINE

## 2023-05-10 PROCEDURE — 83615 LACTATE (LD) (LDH) ENZYME: CPT | Performed by: INTERNAL MEDICINE

## 2023-05-10 PROCEDURE — 86923 COMPATIBILITY TEST ELECTRIC: CPT | Mod: 91 | Performed by: INTERNAL MEDICINE

## 2023-05-10 PROCEDURE — 25000003 PHARM REV CODE 250: Performed by: INTERNAL MEDICINE

## 2023-05-10 PROCEDURE — 83010 ASSAY OF HAPTOGLOBIN QUANT: CPT | Performed by: INTERNAL MEDICINE

## 2023-05-10 PROCEDURE — 84100 ASSAY OF PHOSPHORUS: CPT | Performed by: INTERNAL MEDICINE

## 2023-05-10 PROCEDURE — 85045 AUTOMATED RETICULOCYTE COUNT: CPT | Performed by: INTERNAL MEDICINE

## 2023-05-10 PROCEDURE — 82570 ASSAY OF URINE CREATININE: CPT | Performed by: INTERNAL MEDICINE

## 2023-05-10 PROCEDURE — 86923 COMPATIBILITY TEST ELECTRIC: CPT | Performed by: INTERNAL MEDICINE

## 2023-05-10 PROCEDURE — 83735 ASSAY OF MAGNESIUM: CPT | Performed by: INTERNAL MEDICINE

## 2023-05-10 PROCEDURE — 86900 BLOOD TYPING SEROLOGIC ABO: CPT | Performed by: INTERNAL MEDICINE

## 2023-05-10 PROCEDURE — 82550 ASSAY OF CK (CPK): CPT | Performed by: INTERNAL MEDICINE

## 2023-05-10 PROCEDURE — 85060 BLOOD SMEAR INTERPRETATION: CPT | Performed by: INTERNAL MEDICINE

## 2023-05-10 PROCEDURE — 80061 LIPID PANEL: CPT | Performed by: INTERNAL MEDICINE

## 2023-05-10 PROCEDURE — 81001 URINALYSIS AUTO W/SCOPE: CPT | Performed by: INTERNAL MEDICINE

## 2023-05-10 PROCEDURE — P9016 RBC LEUKOCYTES REDUCED: HCPCS | Performed by: INTERNAL MEDICINE

## 2023-05-10 PROCEDURE — 11000001 HC ACUTE MED/SURG PRIVATE ROOM

## 2023-05-10 PROCEDURE — 63600175 PHARM REV CODE 636 W HCPCS: Performed by: INTERNAL MEDICINE

## 2023-05-10 PROCEDURE — 85025 COMPLETE CBC W/AUTO DIFF WBC: CPT | Performed by: INTERNAL MEDICINE

## 2023-05-10 RX ORDER — HYDROCODONE BITARTRATE AND ACETAMINOPHEN 500; 5 MG/1; MG/1
TABLET ORAL
Status: DISCONTINUED | OUTPATIENT
Start: 2023-05-10 | End: 2023-05-12 | Stop reason: HOSPADM

## 2023-05-10 RX ADMIN — GLIPIZIDE 5 MG: 5 TABLET, FILM COATED, EXTENDED RELEASE ORAL at 10:05

## 2023-05-10 RX ADMIN — FOLIC ACID 1 MG: 1 TABLET ORAL at 10:05

## 2023-05-10 RX ADMIN — SODIUM CHLORIDE 125 MG: 9 INJECTION, SOLUTION INTRAVENOUS at 02:05

## 2023-05-10 RX ADMIN — THERA TABS 1 TABLET: TAB at 10:05

## 2023-05-10 RX ADMIN — LEVOTHYROXINE SODIUM 25 MCG: 25 TABLET ORAL at 05:05

## 2023-05-10 RX ADMIN — SODIUM CHLORIDE 125 MG: 9 INJECTION, SOLUTION INTRAVENOUS at 12:05

## 2023-05-10 RX ADMIN — TAMSULOSIN HYDROCHLORIDE 0.4 MG: 0.4 CAPSULE ORAL at 10:05

## 2023-05-10 RX ADMIN — ARIPIPRAZOLE 5 MG: 5 TABLET ORAL at 10:05

## 2023-05-10 RX ADMIN — FERROUS SULFATE TAB 325 MG (65 MG ELEMENTAL FE) 1 EACH: 325 (65 FE) TAB at 10:05

## 2023-05-10 NOTE — PROGRESS NOTES
[facility] Hospital Medicine Progress Note        Chief Complaint: Inpatient Follow-up for ARF/Rhabdo    HPI:   Mr Arguelles presented to the ER from [facility] facility due to labs showing renal failure.  History is very limited due to his developmental delay.  He is cooperative with no report of any aggressive behavior.  He has multiple medical problems including hyperlipidemia, diabetes, epilepsy, hypothyroidism, GERD, anxiety, depression, intellectual disability and mood disorder.  He typically lives in a group home prior to admission to [facility].  By report his creatinine was around 3 on admission to [facility] and  is 2.4 tonight.  No report of any trouble urinating.  No known prior history of renal disease.   Patient was started on iv fluids and renal functions closely monitored. There was no significant changes in the renal functions. Nephrologist was consulted. He was also anemic but no GI loss was noticed. Hemolysis work up was also done.     Interval Hx:   Patient today awake and comfortable. Denies any chest pain, SOB or weakness. Eating well.     Objective/physical exam:  General: In no acute distress, afebrile  Chest: Clear to auscultation bilaterally  Heart: RRR, +S1, S2, no appreciable murmur  Abdomen: Soft, nontender, BS +  MSK: Warm, no lower extremity edema, no clubbing or cyanosis  Neurologic: Alert and oriented x4, Cranial nerve II-XII intact, Strength 5/5 in all 4 extremities    VITAL SIGNS: 24 HRS MIN & MAX LAST   Temp  Min: 97.7 °F (36.5 °C)  Max: 99.2 °F (37.3 °C) 98 °F (36.7 °C)   BP  Min: 114/80  Max: 152/70 114/80   Pulse  Min: 82  Max: 97  86   Resp  Min: 14  Max: 18 18   SpO2  Min: 96 %  Max: 98 % 96 %       Recent Labs   Lab 05/08/23  0648 05/09/23  0609 05/10/23  0417   WBC 4.90 5.15 5.53   RBC 2.47* 2.37* 2.30*   HGB 8.0* 7.6* 7.3*   HCT 25.1* 25.1* 24.0*   .6* 105.9* 104.3*   MCH 32.4* 32.1* 31.7*   MCHC 31.9* 30.3* 30.4*    RDW 16.1 16.2 16.2    211 220   MPV 11.5* 9.3 9.8       Recent Labs   Lab 05/06/23  0620 05/07/23  0356 05/08/23  0648 05/09/23  0609 05/10/23  0417   * 143 140 143 139   K 4.5 4.8 5.7* 5.1 5.0   CO2 26 25 20* 20* 21*   BUN 28.3* 29.1* 23.8* 28.7* 27.8*   CREATININE 2.16* 2.23* 2.01* 2.08* 2.01*   CALCIUM 8.8 9.3 8.8 9.0 9.4   MG 1.50*  --   --   --  1.20*   ALBUMIN 2.7* 2.8*  --   --  2.9*   ALKPHOS 54 68  --   --  69   ALT 27 33  --   --  35   AST 74* 67*  --   --  38*   BILITOT 0.3 0.2  --   --  0.3          Microbiology Results (last 7 days)       ** No results found for the last 168 hours. **             See below for Radiology    Scheduled Med:   ARIPiprazole  5 mg Oral Daily    divalproex  125 mg Oral QHS    ferrous sulfate  1 tablet Oral Daily    folic acid  1 mg Oral Daily    glipiZIDE  5 mg Oral Daily with breakfast    levothyroxine  25 mcg Oral Before breakfast    multivitamin  1 tablet Oral Daily    tamsulosin  0.4 mg Oral Daily        Continuous Infusions:         PRN Meds:  sodium chloride, dextrose 10%, glucagon (human recombinant), glucose, glucose, insulin aspart U-100, melatonin, sodium chloride 0.9%       Assessment/Plan:  CARMEL with rhabdomyolysis unclear etiology  Mild hyperkalemia-improved  Anemia, Macrocytic: worse   Hyperkalemia   Diabetes Mellitus Type 2  Mood disorder with developmental delay  H/O Seizures     Plan:  Patient looks comfortable and has no acute issues  Hb today 7.3, will transfuse 1 units of PRBC today   Check retic count, Haptoglobin and LDH  Check peripheral smear. May need hematology consult   Platelets has been normal - today 220    No major changes in renal function   US kidneys normal   Has good urine out put.   Seen by renal team and iv fluids has been stopped.     Will closely monitor patients daily weight, urine out put, renal parameters and volume status    BUN 27, Crt 2.01    Home meds reviewed and he was on daily NSAIDs, this could be the cause of his  ARF  Also he is on anti-seizure meds. He could have had a seizure and developed rhabdo  So far no seizure like episodes noticed  CK improving     Current meds and home meds reviewed    Cont sliding scale, sugars controlled. Will start on glucotrol 5 mg po daily (home meds was 10 mg)    Labs in am         VTE prophylaxis: SCDS    Patient condition:  Fair    Anticipated discharge and Disposition:   Home when labs more stable       All diagnosis and differential diagnosis have been reviewed; assessment and plan has been documented; I have personally reviewed the labs and test results that are presently available; I have reviewed the patients medication list; I have reviewed the consulting providers response and recommendations. I have reviewed or attempted to review medical records based upon their availability    All of the patient's questions have been  addressed and answered. Patient's is agreeable to the above stated plan. I will continue to monitor closely and make adjustments to medical management as needed.  _____________________________________________________________________    Nutrition Status:    Radiology:  US Retroperitoneal Complete  Narrative: EXAMINATION:  US RETROPERITONEAL COMPLETE    CLINICAL HISTORY:  CARMEL;    COMPARISON:  None.    FINDINGS:  Grayscale and color Doppler sonographic evaluation of the kidneys and urinary bladder.    Poor acoustic windows for the kidneys.  The right kidney measures 10 cm. The left kidney measures 10 cm.   No hydronephrosis.  Grossly normal renal parenchymal echogenicity.    No significant abnormality of the urinary bladder.  Impression: No significant sonographic abnormality of the kidneys.    Electronically signed by: Sudeep Edgar  Date:    05/06/2023  Time:    17:55      Otis Muñoz MD   05/10/2023

## 2023-05-10 NOTE — PROGRESS NOTES
"Inpatient Nutrition Evaluation    Admit Date: 5/5/2023   Total duration of encounter: 5 days    Nutrition Recommendation/Prescription     Continue Adult regular diet.   Dietitian will monitor food and beverage intake and electrolyte/renal panel.    Nutrition Assessment     Chart Review    Reason Seen: continuous nutrition monitoring    Malnutrition Screening Tool Results   Have you recently lost weight without trying?: No  Have you been eating poorly because of a decreased appetite?: No   MST Score: 0     Diagnosis:  CARMEL    Relevant Medical History: hyperlipidemia, diabetes, epilepsy, hypothyroidism, GERD, anxiety, depression, intellectual disability and mood disorder    Nutrition-Related Medications: ferrous sulfate, folic acid, glipizide, multivitamin    Nutrition-Related Labs:  5/9/23: A1C 5.7  5/10/23: HGB/HCT 7.3/24, Chloride 112, CO2 21, BUN 27.8, Creat 2.01, Glucose 172, Magnesium, 1.2, AST 38, Triglycerides 270    Diet Order: Diet Adult Regular  Oral Supplement Order: none  Appetite/Oral Intake: good/% of meals  Factors Affecting Nutritional Intake: none identified  Food/Synagogue/Cultural Preferences: none reported  Food Allergies: none reported    Skin Integrity: bruised (ecchymotic)  Wound(s):   no pressure injuries    Comments  5/10/23: Reports good appetite currently and before admission. Lives in group home; all meals prepared for him. While Glucose and triglycerides elevated today, A1C of 5.7 shows well controlled diabetes prior to admission. No recent weight changes. Denies nausea, vomiting, diarrhea, and constipation. Takes MVI. Pt had no questions.     Anthropometrics    Height: 5' 4" (162.6 cm) Height Method: Estimated  Last Weight: 82.6 kg (182 lb 1.6 oz) (05/08/23 0608) Weight Method: Bed Scale  BMI (Calculated): 31.2  BMI Classification: obese grade I (BMI 30-34.9)        Ideal Body Weight (IBW), Male: 130 lb     % Ideal Body Weight, Male (lb): 138.77 %                 Usual Body Weight " (UBW), k.9 kg (Pt does not know recent UBW, per  lb in )  % Usual Body Weight: 98.66     Usual Weight Provided By: patient and EMR weight history    Wt Readings from Last 5 Encounters:   23 82.6 kg (182 lb 1.6 oz)   21 83.9 kg (184 lb 15.5 oz)     Weight Change(s) Since Admission:  Admit Weight: 81.6 kg (180 lb) (23 182)  No significant wt change per EMR and patient    Patient Education    Not applicable.    Monitoring & Evaluation     Dietitian will monitor food and beverage intake and electrolyte/renal panel.  Nutrition Risk/Follow-Up: low (follow-up in 5-7 days)  Patients assigned 'low nutrition risk' status do not qualify for a full nutritional assessment but will be monitored and re-evaluated in a 5-7 day time period. Please consult if re-evaluation needed sooner.

## 2023-05-11 LAB
ABO + RH BLD: NORMAL
ALBUMIN SERPL-MCNC: 2.8 G/DL (ref 3.5–5)
ALBUMIN/GLOB SERPL: 1 RATIO (ref 1.1–2)
ALP SERPL-CCNC: 62 UNIT/L (ref 40–150)
ALT SERPL-CCNC: 32 UNIT/L (ref 0–55)
AST SERPL-CCNC: 37 UNIT/L (ref 5–34)
BASOPHILS # BLD AUTO: 0.02 X10(3)/MCL
BASOPHILS NFR BLD AUTO: 0.3 %
BILIRUBIN DIRECT+TOT PNL SERPL-MCNC: 0.7 MG/DL
BLD PROD TYP BPU: NORMAL
BLOOD UNIT EXPIRATION DATE: NORMAL
BLOOD UNIT TYPE CODE: 9500
BUN SERPL-MCNC: 25 MG/DL (ref 8.9–20.6)
CALCIUM SERPL-MCNC: 9.1 MG/DL (ref 8.4–10.2)
CHLORIDE SERPL-SCNC: 113 MMOL/L (ref 98–107)
CO2 SERPL-SCNC: 17 MMOL/L (ref 22–29)
COLOR STL: NORMAL
CONSISTENCY STL: NORMAL
CREAT SERPL-MCNC: 2.01 MG/DL (ref 0.73–1.18)
CROSSMATCH INTERPRETATION: NORMAL
DISPENSE STATUS: NORMAL
EOSINOPHIL # BLD AUTO: 0.17 X10(3)/MCL (ref 0–0.9)
EOSINOPHIL NFR BLD AUTO: 2.5 %
ERYTHROCYTE [DISTWIDTH] IN BLOOD BY AUTOMATED COUNT: 19.8 % (ref 11.5–17)
GFR SERPLBLD CREATININE-BSD FMLA CKD-EPI: 41 MLS/MIN/1.73/M2
GLOBULIN SER-MCNC: 2.8 GM/DL (ref 2.4–3.5)
GLUCOSE SERPL-MCNC: 146 MG/DL (ref 74–100)
HCT VFR BLD AUTO: 26.3 % (ref 42–52)
HEMOCCULT SP1 STL QL: NEGATIVE
HGB BLD-MCNC: 8.2 G/DL (ref 14–18)
IMM GRANULOCYTES # BLD AUTO: 0.03 X10(3)/MCL (ref 0–0.04)
IMM GRANULOCYTES NFR BLD AUTO: 0.4 %
LYMPHOCYTES # BLD AUTO: 2.28 X10(3)/MCL (ref 0.6–4.6)
LYMPHOCYTES NFR BLD AUTO: 33.5 %
MCH RBC QN AUTO: 31.1 PG (ref 27–31)
MCHC RBC AUTO-ENTMCNC: 31.2 G/DL (ref 33–36)
MCV RBC AUTO: 99.6 FL (ref 80–94)
MONOCYTES # BLD AUTO: 0.53 X10(3)/MCL (ref 0.1–1.3)
MONOCYTES NFR BLD AUTO: 7.8 %
NEUTROPHILS # BLD AUTO: 3.77 X10(3)/MCL (ref 2.1–9.2)
NEUTROPHILS NFR BLD AUTO: 55.5 %
NRBC BLD AUTO-RTO: 0 %
PLATELET # BLD AUTO: 218 X10(3)/MCL (ref 130–400)
PMV BLD AUTO: 10.4 FL (ref 7.4–10.4)
POCT GLUCOSE: 193 MG/DL (ref 70–110)
POCT GLUCOSE: 196 MG/DL (ref 70–110)
POCT GLUCOSE: 243 MG/DL (ref 70–110)
POCT GLUCOSE: 251 MG/DL (ref 70–110)
POTASSIUM SERPL-SCNC: 5.1 MMOL/L (ref 3.5–5.1)
PROT SERPL-MCNC: 5.6 GM/DL (ref 6.4–8.3)
RBC # BLD AUTO: 2.64 X10(6)/MCL (ref 4.7–6.1)
SODIUM SERPL-SCNC: 137 MMOL/L (ref 136–145)
UNIT NUMBER: NORMAL
WBC # SPEC AUTO: 6.8 X10(3)/MCL (ref 4.5–11.5)

## 2023-05-11 PROCEDURE — 25000003 PHARM REV CODE 250: Performed by: INTERNAL MEDICINE

## 2023-05-11 PROCEDURE — 63600175 PHARM REV CODE 636 W HCPCS: Performed by: INTERNAL MEDICINE

## 2023-05-11 PROCEDURE — P9016 RBC LEUKOCYTES REDUCED: HCPCS | Performed by: INTERNAL MEDICINE

## 2023-05-11 PROCEDURE — 80053 COMPREHEN METABOLIC PANEL: CPT | Performed by: INTERNAL MEDICINE

## 2023-05-11 PROCEDURE — 25000003 PHARM REV CODE 250: Performed by: FAMILY MEDICINE

## 2023-05-11 PROCEDURE — 85025 COMPLETE CBC W/AUTO DIFF WBC: CPT | Performed by: INTERNAL MEDICINE

## 2023-05-11 PROCEDURE — 11000001 HC ACUTE MED/SURG PRIVATE ROOM

## 2023-05-11 PROCEDURE — 25000003 PHARM REV CODE 250: Performed by: STUDENT IN AN ORGANIZED HEALTH CARE EDUCATION/TRAINING PROGRAM

## 2023-05-11 PROCEDURE — 82272 OCCULT BLD FECES 1-3 TESTS: CPT | Performed by: INTERNAL MEDICINE

## 2023-05-11 RX ORDER — SODIUM BICARBONATE 650 MG/1
1300 TABLET ORAL 3 TIMES DAILY
Status: DISCONTINUED | OUTPATIENT
Start: 2023-05-11 | End: 2023-05-12 | Stop reason: HOSPADM

## 2023-05-11 RX ORDER — HYDROCODONE BITARTRATE AND ACETAMINOPHEN 500; 5 MG/1; MG/1
TABLET ORAL
Status: DISCONTINUED | OUTPATIENT
Start: 2023-05-11 | End: 2023-05-12 | Stop reason: HOSPADM

## 2023-05-11 RX ORDER — SODIUM BICARBONATE 650 MG/1
650 TABLET ORAL 3 TIMES DAILY
Status: DISCONTINUED | OUTPATIENT
Start: 2023-05-11 | End: 2023-05-11

## 2023-05-11 RX ADMIN — FOLIC ACID 1 MG: 1 TABLET ORAL at 09:05

## 2023-05-11 RX ADMIN — LEVOTHYROXINE SODIUM 25 MCG: 25 TABLET ORAL at 05:05

## 2023-05-11 RX ADMIN — SODIUM BICARBONATE 650 MG TABLET 1300 MG: at 08:05

## 2023-05-11 RX ADMIN — FERROUS SULFATE TAB 325 MG (65 MG ELEMENTAL FE) 1 EACH: 325 (65 FE) TAB at 09:05

## 2023-05-11 RX ADMIN — Medication 6 MG: at 08:05

## 2023-05-11 RX ADMIN — ARIPIPRAZOLE 5 MG: 5 TABLET ORAL at 09:05

## 2023-05-11 RX ADMIN — SODIUM BICARBONATE 650 MG TABLET 650 MG: at 09:05

## 2023-05-11 RX ADMIN — GLIPIZIDE 5 MG: 5 TABLET, FILM COATED, EXTENDED RELEASE ORAL at 09:05

## 2023-05-11 RX ADMIN — TAMSULOSIN HYDROCHLORIDE 0.4 MG: 0.4 CAPSULE ORAL at 09:05

## 2023-05-11 RX ADMIN — SODIUM BICARBONATE 650 MG TABLET 1300 MG: at 05:05

## 2023-05-11 RX ADMIN — THERA TABS 1 TABLET: TAB at 09:05

## 2023-05-11 RX ADMIN — SODIUM CHLORIDE 125 MG: 9 INJECTION, SOLUTION INTRAVENOUS at 09:05

## 2023-05-11 NOTE — PROGRESS NOTES
Nephrology consult follow up note    HPI:      Bacilio Arguelles is a 47 y.o. male with rhabdomyolysis and CARMEL. May have underlying CKD.      Interval history:     No acute events overnight. Pt not drinking much water, <1L yesterday. No new complaints. No CP, SOB, abd pain, N/V, or LE edema.      Review of Systems:     Comprehensive 10pt ROS negative except as noted per HPI.     Past medical, family, surgical, and social history reviewed and unchanged from initial consult note.     Objective:       VITAL SIGNS: 24 HR MIN & MAX LAST    Temp  Min: 98 °F (36.7 °C)  Max: 100.2 °F (37.9 °C)  98.1 °F (36.7 °C)        BP  Min: 113/76  Max: 146/93  (!) 129/90     Pulse  Min: 83  Max: 97  96     Resp  Min: 18  Max: 18  18    SpO2  Min: 94 %  Max: 97 %  96 %      GEN: Well appearing WM  HEENT: Conjunctiva anicteric  CV: RRR +S1,S2 without murmur  PULM: CTAB, unlabored  ABD: Soft, NT/ND abdomen with NABS  EXT: No cyanosis or edema  SKIN: Warm and dry  PSYCH: Awake, alert and appropriately conversant.   Vascular access: No HD access            Component Value Date/Time     05/11/2023 0331     05/10/2023 0417    K 5.1 05/11/2023 0331    K 5.0 05/10/2023 0417    CHLORIDE 113 (H) 05/11/2023 0331    CHLORIDE 112 (H) 05/10/2023 0417    CO2 17 (L) 05/11/2023 0331    CO2 21 (L) 05/10/2023 0417    BUN 25.0 (H) 05/11/2023 0331    BUN 27.8 (H) 05/10/2023 0417    CREATININE 2.01 (H) 05/11/2023 0331    CREATININE 2.01 (H) 05/10/2023 0417    CALCIUM 9.1 05/11/2023 0331    CALCIUM 9.4 05/10/2023 0417    PHOS 3.7 05/10/2023 0417            Component Value Date/Time    WBC 6.80 05/11/2023 0623    WBC 5.53 05/10/2023 0417    WBC 6.60 07/27/2020 1215    HGB 8.2 (L) 05/11/2023 0623    HGB 7.3 (L) 05/10/2023 0417    HGB 13.0 (L) 07/27/2020 1215    HCT 26.3 (L) 05/11/2023 0623    HCT 24.0 (L) 05/10/2023 0417    HCT 39.4 (L) 07/27/2020 1215     05/11/2023 0623     05/10/2023 0417     07/27/2020 1215          Imaging reviewed      Assessment / Plan:       There are no hospital problems to display for this patient.      CARMEL which is improving.  Agree with the assessment of Dr. Munoz that there is a possibility that he had some kind of seizure which caused him to have some rhabdomyolysis which may have caused him to have CARMEL.  Possible underlying CKD 3A secondary to hypertensive nephrosclerosis.  History of high blood pressure  History diabetes mellitus type 2  History of hyperlipidemia  Metabolic acidosis    Plan:  Renal function stable. He may have underlying CKD. Unsure of baseline. Encourage PO water intake of at least 2L/day. Increase sodium bicarbonate PO to 1300mg tid. Ok to discharge from a nephrology standpoint whenever ready.       Husam Caceres DO  Nephrology  [facility]  Clinic number: [phone]

## 2023-05-11 NOTE — PROGRESS NOTES
[facility] Hospital Medicine Progress Note        Chief Complaint: Inpatient Follow-up    HPI:   47-year-old male who was at [facility] was sent to the ED for abnormal labs showing acute kidney injury.  Patient has history of type 2 diabetes, HLD, seizure disorder, hypothyroidism, GERD, anxiety, depression, intellectual disability, mood disorder.  Patient was admitted hospitalist medicine service, initiated IV fluids with no significant improvement in renal function and therefore Nephrology consulted .  patient also was noted to be anemic, no overt bleeding.  FOBT x1 negative.  Nephrology evaluated paid most likely patient has chronic kidney disease secondary to hypertensive nephrosclerosis.  There is also suspicion that patient could possibly have had a seizure reading to [employer] which might have resulted in acute kidney injury.  IV fluids continued, renal function improved, IV fluids discontinued and currently monitoring off fluids.  Patient was transfused with 1 unit PRBC on 05/10 given hemoglobin dropping below 8.  Also initiated IV iron.  Psych evaluated, recommended DC back to group home when medically stable.         Interval Hx:   Patient seen at bedside.  Comfortably laying in bed.  Hemodynamics are stable.  No overt bleeding.  Tolerating by mouth diet.  No acute events overnight    Objective/physical exam:  General: In no acute distress, afebrile  Chest: Clear to auscultation bilaterally  Heart: S1, S2, no appreciable murmur  Abdomen: Soft, nontender, BS +  MSK: Warm, no lower extremity edema, no clubbing or cyanosis  Neurologic: Alert and oriented x4, moving all extremities with good strength     VITAL SIGNS: 24 HRS MIN & MAX LAST   Temp  Min: 98 °F (36.7 °C)  Max: 100.2 °F (37.9 °C) 98.1 °F (36.7 °C)   BP  Min: 113/76  Max: 146/93 (!) 129/90   Pulse  Min: 83  Max: 97  96   Resp  Min: 18  Max: 18 18   SpO2  Min: 94 %  Max: 97 % 96 %       Recent Labs   Lab  05/11/23  0623   WBC 6.80   RBC 2.64*   HGB 8.2*   HCT 26.3*   MCV 99.6*   MCH 31.1*   MCHC 31.2*   RDW 19.8*      MPV 10.4         Recent Labs   Lab 05/10/23  0417 05/11/23  0331    137   K 5.0 5.1   CO2 21* 17*   BUN 27.8* 25.0*   CREATININE 2.01* 2.01*   CALCIUM 9.4 9.1   MG 1.20*  --    ALBUMIN 2.9* 2.8*   ALKPHOS 69 62   ALT 35 32   AST 38* 37*   BILITOT 0.3 0.7          Microbiology Results (last 7 days)       ** No results found for the last 168 hours. **             Scheduled Med:   ARIPiprazole  5 mg Oral Daily    divalproex  125 mg Oral QHS    ferric gluconate (FERRLECIT) IVPB  125 mg Intravenous Daily    ferrous sulfate  1 tablet Oral Daily    folic acid  1 mg Oral Daily    glipiZIDE  5 mg Oral Daily with breakfast    levothyroxine  25 mcg Oral Before breakfast    multivitamin  1 tablet Oral Daily    tamsulosin  0.4 mg Oral Daily          Assessment/Plan:    CARMEL with possible underlying CKD  Hyperchloremic metabolic acidosis  Acute on chronic macrocytic anemia status post PRBC transfusion 5/8   Iron-deficiency  Acute nontraumatic rhabdomyolysis-suspect secondary to seizure-improved  Intellectual disability   HLD   Type 2 diabetes mellitus  Seizure disorder  Hypothyroidism  GERD  Anxiety/depression   Mood disorder   Prophylaxis    Renal function remaining pretty stable off fluids   Significant hyperchloremic metabolic acidosis noted  Nephrology has increased p.o. bicarb 2300 mg t.i.d.  Avoid nephrotoxins  No obstructive uropathy   most likely has underlying CKD, rhabdo on admit possibly secondary to breakthrough seizures might have led to acute kidney injury  CPK trended down   Hemoglobin still 8.2   I will give him 1 more unit PRBC   FOBT x1 was negative, will repeat especially given NS AID use  Continue IV iron   Continue other home meds-aripiprazole, valproic acid, folic acid, glipizide, levothyroxine, multivitamin, tamsulosin  DVT prophylaxis-bilateral SCDs, no chemical prophylaxis given  severe anemia    If renal function stable, hemoglobin improves and 2nd FOBT negative will discharge him back to group home tomorrow      Alexa Cummings MD   05/11/2023

## 2023-05-11 NOTE — PHYSICIAN QUERY
PT Name: Bacilio Arguelles  MR #: [id number]     DOCUMENTATION CLARIFICATION     CDS/: Niki Griffiths RN, BSN              Contact information: [email or web address]   This form is a permanent document in the medical record.    Query Date: May 11, 2023    By submitting this query, we are merely seeking further clarification of documentation.  Please utilize your independent clinical judgment when addressing the question(s) below.    The Medical Record contains the following:   Indicator Supporting Clinical Findings Location in Medical Record    Kidney (Renal) Insufficiency     x Kidney (Renal) Failure/Injury urine studies did not show any evidence of any proteinuria suggestive of that patient has  CARMEL which is improving with some underlying CKD problem secondary to hypertensive nephrosclerosis.    CARMEL which is improving.  Agree with the assessment of Dr. Munoz that there is a possibility that he had some kind of seizure which caused him to have some rhabdomyolysis which may have caused him to have CARMEL.    Possible underlying CKD 3A secondary to hypertensive nephrosclerosis.    started on iv fluids and renal functions closely monitored. There was no significant changes in the renal functions   Nephrology, 5/10                HM, PN, 5/10   x Nephrotoxic Agents Home meds reviewed and he was on daily NSAIDs, this could be the cause of his ARF  Also he is on anti-seizure meds. He could have had a seizure and developed rhabdo   HM, PN, 5/10   x BUN/Creatinine           GFR  05/05/23 19:01 05/06/23 06:20 05/07/23 03:56 05/08/23 06:48 05/09/23 06:09 05/10/23 04:17 05/11/23 03:31   Creatinine 2.44 (H) 2.16 (H) 2.23 (H) 2.01 (H) 2.08 (H) 2.01 (H) 2.01 (H)      05/05/23 19:01 05/06/23 06:20 05/07/23 03:56 05/08/23 06:48 05/09/23 06:09 05/10/23 04:17 05/11/23 03:31   BUN 27.9 (H) 28.3 (H) 29.1 (H) 23.8 (H) 28.7 (H) 27.8 (H) 25.0 (H)      05/05/23 19:01 05/06/23 06:20 05/07/23 03:56 05/08/23 06:48 05/09/23 06:09 05/10/23  04:17 05/11/23 03:31   eGFR 32 38 36 41 39 41 41    LAB                                Urine: Casts         Eosinophils      Urine Output      Dehydration      Nausea/Vomiting      Dialysis/CRRT     x Treatment Will give him Ferrlecit today and tomorrow to help fill iron stores.  Check labs tomorrow.    started on iv fluids and renal functions closely monitored. There was no significant changes in the renal functions    Will closely monitor patients daily weight, urine out put, renal parameters and volume status    BUN 27, Crt 2.01    No major changes in renal function   US kidneys normal   Has good urine out put.   Seen by renal team and iv fluids has been stopped   Nephrology, 5/10      HM, PN, 5/10   x Other  05/05/23 19:01 05/10/23 10:19   Color, UA Yellow Yellow   Appearance, UA Clear Clear   Specific Gravity,UA 1.010 1.009   pH, UA 6.0 6.5   Protein, UA Negative Negative   Glucose,  ! 1+ !   Ketones, UA Negative Negative   Occult Blood UA Trace ! Negative   NITRITE UA Negative Negative   Bilirubin, UA Negative Negative   Urobilinogen, UA 1.0 0.2   Leukocytes, UA Negative Negative   RBC, UA 0-2 <5   WBC, UA 0-2 <5   Bacteria, UA Trace ! None Seen   Squam Epithel, UA Few ! <5   Mucous, UA Small !     Urinalysis, 5/5, 5/10                                                             [facility] approved diagnostic criteria for acute kidney injury is based on KDIGO criteria:    An increase in serum creatinine > 0.3mg/dl within 48 hours  OR  Increase in serum creatinine to > 1.5x baseline, which is known or presumed to have occurred within the prior 7 days  OR  Urine volume <0.5 ml/kg/hr for 6 hours       The clinical guidelines noted above are only a system guideline. It does not replace the providers clinical judgment.     Provider, please further specify the CARMEL diagnosis or diagnoses associated with above clinical findings.     [    ] Acute Kidney Failure/Injury with Tubular Necrosis  - Damage to the  tubule cells of the kidney. Common triggers: shock, hypotension, IV contrast, rhabdomyolysis, medications     [    ] Unspecified Acute Kidney Failure/Injury       [   x ] Other Acute Kidney Failure/Injury (please specify): _2/2 to rhabdomyolysis___________       [  ] Clinically Undetermined         Please document in your progress notes daily for the duration of treatment until resolved and include in your discharge summary.    References:   KDIGO Clinical Practice Guideline for Acute Kidney Injury. (2012, March). Retrieved October 21, 2020, from https://kdigo.org/wp-content/uploads/2016/10/PUJTU-1972-VXY-Guideline-English.pdf    DAVINA Ta MD, FAWN Yoon MD, & ENMA Ayala MD. (1960). Renal medullary necrosis [Abstract]. The American Journal of Medicine, 29(1), 132-156. Doi:https://www.sciencedirect.com/science/article/abs/pii/5065679306749767    ENMA Allison MD, & SADIE Tirado MD, MS. (2020, June 18). Definition and staging of chronic kidney disease in adults ([id number] FAWN Higuera MD, ScD & [id number] ERMIAS Bansal MD, MSc, Eds.). Retrieved October 21, 2020, from https://www.Noemalife.PulseOn/contents/definition-and-staging-of-chronic-kidney-disease-in-adults?search=ckd%20staging&source=search_result&selectedTitle=1~150&usage_type=default&display_rank=1     HENRY Schumacher MD, FACP. (2015, Estrellita 15). Acute kidney injury revisited. Retrieved October 21, 2020, from https://acphospitalist.org/archives/2015/06/coding-acute-kidney-injury.htm    KEZIA Lane MD. (2019, July). Renal Cortical Necrosis. Retrieved October 21, 2020, from [email or web address]/genitourinary-disorders/renovascular-disorders/renal-cortical-necrosis    Form No. 15507
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
## 2023-05-12 VITALS
DIASTOLIC BLOOD PRESSURE: 93 MMHG | HEART RATE: 94 BPM | BODY MASS INDEX: 31.09 KG/M2 | WEIGHT: 182.13 LBS | OXYGEN SATURATION: 98 % | HEIGHT: 64 IN | TEMPERATURE: 98 F | RESPIRATION RATE: 18 BRPM | SYSTOLIC BLOOD PRESSURE: 163 MMHG

## 2023-05-12 PROBLEM — N17.9 AKI (ACUTE KIDNEY INJURY): Status: ACTIVE | Noted: 2023-05-12

## 2023-05-12 LAB
ALBUMIN SERPL-MCNC: 2.8 G/DL (ref 3.5–5)
ALBUMIN/GLOB SERPL: 1 RATIO (ref 1.1–2)
ALP SERPL-CCNC: 74 UNIT/L (ref 40–150)
ALT SERPL-CCNC: 36 UNIT/L (ref 0–55)
AST SERPL-CCNC: 33 UNIT/L (ref 5–34)
BASOPHILS # BLD AUTO: 0.02 X10(3)/MCL
BASOPHILS NFR BLD AUTO: 0.4 %
BILIRUBIN DIRECT+TOT PNL SERPL-MCNC: 0.6 MG/DL
BUN SERPL-MCNC: 28.6 MG/DL (ref 8.9–20.6)
CALCIUM SERPL-MCNC: 9.1 MG/DL (ref 8.4–10.2)
CHLORIDE SERPL-SCNC: 112 MMOL/L (ref 98–107)
CO2 SERPL-SCNC: 21 MMOL/L (ref 22–29)
CREAT SERPL-MCNC: 1.98 MG/DL (ref 0.73–1.18)
EOSINOPHIL # BLD AUTO: 0.19 X10(3)/MCL (ref 0–0.9)
EOSINOPHIL NFR BLD AUTO: 3.5 %
ERYTHROCYTE [DISTWIDTH] IN BLOOD BY AUTOMATED COUNT: 19.1 % (ref 11.5–17)
GFR SERPLBLD CREATININE-BSD FMLA CKD-EPI: 42 MLS/MIN/1.73/M2
GLOBULIN SER-MCNC: 2.8 GM/DL (ref 2.4–3.5)
GLUCOSE SERPL-MCNC: 185 MG/DL (ref 74–100)
HCT VFR BLD AUTO: 28.2 % (ref 42–52)
HGB BLD-MCNC: 8.8 G/DL (ref 14–18)
IMM GRANULOCYTES # BLD AUTO: 0.02 X10(3)/MCL (ref 0–0.04)
IMM GRANULOCYTES NFR BLD AUTO: 0.4 %
LYMPHOCYTES # BLD AUTO: 1.89 X10(3)/MCL (ref 0.6–4.6)
LYMPHOCYTES NFR BLD AUTO: 34.6 %
MCH RBC QN AUTO: 30.6 PG (ref 27–31)
MCHC RBC AUTO-ENTMCNC: 31.2 G/DL (ref 33–36)
MCV RBC AUTO: 97.9 FL (ref 80–94)
MONOCYTES # BLD AUTO: 0.49 X10(3)/MCL (ref 0.1–1.3)
MONOCYTES NFR BLD AUTO: 9 %
NEUTROPHILS # BLD AUTO: 2.86 X10(3)/MCL (ref 2.1–9.2)
NEUTROPHILS NFR BLD AUTO: 52.1 %
NRBC BLD AUTO-RTO: 0 %
PLATELET # BLD AUTO: 247 X10(3)/MCL (ref 130–400)
PMV BLD AUTO: 9.6 FL (ref 7.4–10.4)
POCT GLUCOSE: 169 MG/DL (ref 70–110)
POTASSIUM SERPL-SCNC: 4.7 MMOL/L (ref 3.5–5.1)
PROT SERPL-MCNC: 5.6 GM/DL (ref 6.4–8.3)
RBC # BLD AUTO: 2.88 X10(6)/MCL (ref 4.7–6.1)
SODIUM SERPL-SCNC: 140 MMOL/L (ref 136–145)
WBC # SPEC AUTO: 5.47 X10(3)/MCL (ref 4.5–11.5)

## 2023-05-12 PROCEDURE — 25000003 PHARM REV CODE 250: Performed by: STUDENT IN AN ORGANIZED HEALTH CARE EDUCATION/TRAINING PROGRAM

## 2023-05-12 PROCEDURE — 85025 COMPLETE CBC W/AUTO DIFF WBC: CPT | Performed by: INTERNAL MEDICINE

## 2023-05-12 PROCEDURE — 25000003 PHARM REV CODE 250: Performed by: INTERNAL MEDICINE

## 2023-05-12 PROCEDURE — 80053 COMPREHEN METABOLIC PANEL: CPT | Performed by: INTERNAL MEDICINE

## 2023-05-12 RX ORDER — SODIUM BICARBONATE 650 MG/1
650 TABLET ORAL 3 TIMES DAILY
Qty: 21 TABLET | Refills: 0 | Status: SHIPPED | OUTPATIENT
Start: 2023-05-12 | End: 2023-05-19

## 2023-05-12 RX ORDER — FUROSEMIDE 40 MG/1
20 TABLET ORAL DAILY PRN
Start: 2023-05-12

## 2023-05-12 RX ORDER — GLIPIZIDE 5 MG/1
5 TABLET, FILM COATED, EXTENDED RELEASE ORAL
Qty: 30 TABLET | Refills: 0 | Status: SHIPPED | OUTPATIENT
Start: 2023-05-12 | End: 2023-06-11

## 2023-05-12 RX ORDER — FOLIC ACID 1 MG/1
1 TABLET ORAL DAILY
Qty: 30 TABLET | Refills: 0 | Status: SHIPPED | OUTPATIENT
Start: 2023-05-12 | End: 2023-06-11

## 2023-05-12 RX ORDER — FERROUS SULFATE 325(65) MG
325 TABLET, DELAYED RELEASE (ENTERIC COATED) ORAL DAILY
Qty: 30 TABLET | Refills: 0 | Status: SHIPPED | OUTPATIENT
Start: 2023-05-12 | End: 2023-06-11

## 2023-05-12 RX ADMIN — SODIUM BICARBONATE 650 MG TABLET 1300 MG: at 10:05

## 2023-05-12 RX ADMIN — TAMSULOSIN HYDROCHLORIDE 0.4 MG: 0.4 CAPSULE ORAL at 10:05

## 2023-05-12 RX ADMIN — ARIPIPRAZOLE 5 MG: 5 TABLET ORAL at 10:05

## 2023-05-12 RX ADMIN — GLIPIZIDE 5 MG: 5 TABLET, FILM COATED, EXTENDED RELEASE ORAL at 07:05

## 2023-05-12 RX ADMIN — FERROUS SULFATE TAB 325 MG (65 MG ELEMENTAL FE) 1 EACH: 325 (65 FE) TAB at 10:05

## 2023-05-12 RX ADMIN — THERA TABS 1 TABLET: TAB at 10:05

## 2023-05-12 RX ADMIN — LEVOTHYROXINE SODIUM 25 MCG: 25 TABLET ORAL at 06:05

## 2023-05-12 RX ADMIN — FOLIC ACID 1 MG: 1 TABLET ORAL at 10:05

## 2023-05-12 NOTE — DISCHARGE SUMMARY
[facility] Hospital Medicine Discharge Summary    Admit Date: 5/5/2023  Discharge Date and Time: 5/12/20237:47 AM  Admitting Physician: WES Team  Discharging Physician: Alexa Cummings MD.  Primary Care Physician: Malik Zavala MD      Discharge Diagnoses:  CARMEL with possible underlying CKD  Hyperchloremic metabolic acidosis  Acute on chronic macrocytic anemia status post PRBC transfusion 5/8   Iron-deficiency  Acute nontraumatic rhabdomyolysis-suspect secondary to seizure-improved  Intellectual disability   HLD   Type 2 diabetes mellitus  Seizure disorder  Hypothyroidism  GERD  Anxiety/depression   Mood disorder     Hospital Course:     47-year-old male who [facility] was sent to the ED for abnormal labs showing acute kidney injury.  Patient has history of type 2 diabetes, HLD, seizure disorder, hypothyroidism, GERD, anxiety, depression, intellectual disability, mood disorder.  Patient was admitted hospitalist medicine service, initiated IV fluids with no significant improvement in renal function and therefore Nephrology consulted .  patient also was noted to be anemic, no overt bleeding.  FOBT x1 negative.  Nephrology evaluated paid most likely patient has chronic kidney disease secondary to hypertensive nephrosclerosis.  There is also suspicion that patient could possibly have had a seizure reading to rhabdo which might have resulted in acute kidney injury.  IV fluids continued, renal function improved, IV fluids discontinued and currently monitoring off fluids.  Patient was transfused with 1 unit PRBC on 05/10 given hemoglobin dropping below 8.  Also initiated IV iron.  Psych evaluated, recommended DC back to group home when medically stable.  Renal function remaining pretty stable off fluids as of 5/11.  initiated p.o. bicarb for metabolic acidosis .  most likely patient has underlying chronic kidney disease and rhabdo on admit secondary to breakthrough  seizures might have resulted in acute kidney injury.  CPK trended down .  hemoglobin was 8.2 on 05/11 therefore decided to transfuse with 1 unit PRBC . FOBT was negative .  IV iron continued.  Patient re-evaluated 5/12.  Symptomatically stable.  Hemodynamics stable.  Hemoglobin appropriately improved.  Renal function also remaining stable.  No overt bleeding.  Metabolic acidosis better .  Therefore it was decided to discharge the patient to group home.  Discharge medications per med rec.  Follow-up with PCP, psych, Nephrology as outpatient.  Vitals:  VITAL SIGNS: 24 HRS MIN & MAX LAST   Temp  Min: 98.1 °F (36.7 °C)  Max: 99 °F (37.2 °C) 98.1 °F (36.7 °C)   BP  Min: 124/82  Max: 181/79 134/76   Pulse  Min: 82  Max: 105  91   Resp  Min: 18  Max: 21 19   SpO2  Min: 95 %  Max: 99 % 98 %       Physical Exam:  General appearance:  No acute distress  HENT: Atraumatic   Lungs: Clear to auscultation bilaterally.   Heart: RRR,No edema  Abdomen: Soft, non tender   Extremities: warm  Neuro:  Awake, alert,   Psych/mental status: Appropriate mood and affect.      Procedures Performed: No admission procedures for hospital encounter.     Significant Diagnostic Studies: See Full reports for all details    Recent Labs   Lab 05/10/23  0417 05/11/23  0623 05/12/23  0359   WBC 5.53 6.80 5.47   RBC 2.30* 2.64* 2.88*   HGB 7.3* 8.2* 8.8*   HCT 24.0* 26.3* 28.2*   .3* 99.6* 97.9*   MCH 31.7* 31.1* 30.6   MCHC 30.4* 31.2* 31.2*   RDW 16.2 19.8* 19.1*    218 247   MPV 9.8 10.4 9.6       Recent Labs   Lab 05/06/23  0620 05/07/23  0356 05/10/23  0417 05/11/23  0331 05/12/23  0359   *   < > 139 137 140   K 4.5   < > 5.0 5.1 4.7   CO2 26   < > 21* 17* 21*   BUN 28.3*   < > 27.8* 25.0* 28.6*   CREATININE 2.16*   < > 2.01* 2.01* 1.98*   CALCIUM 8.8   < > 9.4 9.1 9.1   MG 1.50*  --  1.20*  --   --    ALBUMIN 2.7*   < > 2.9* 2.8* 2.8*   ALKPHOS 54   < > 69 62 74   ALT 27   < > 35 32 36   AST 74*   < > 38* 37* 33   BILITOT 0.3   <  > 0.3 0.7 0.6    < > = values in this interval not displayed.        Microbiology Results (last 7 days)       ** No results found for the last 168 hours. **             US Retroperitoneal Complete  Narrative: EXAMINATION:  US RETROPERITONEAL COMPLETE    CLINICAL HISTORY:  CARMEL;    COMPARISON:  None.    FINDINGS:  Grayscale and color Doppler sonographic evaluation of the kidneys and urinary bladder.    Poor acoustic windows for the kidneys.  The right kidney measures 10 cm. The left kidney measures 10 cm.   No hydronephrosis.  Grossly normal renal parenchymal echogenicity.    No significant abnormality of the urinary bladder.  Impression: No significant sonographic abnormality of the kidneys.    Electronically signed by: Sudeep Edgar  Date:    05/06/2023  Time:    17:55         Medication List        START taking these medications      ferrous sulfate 325 (65 FE) MG EC tablet  Take 1 tablet (325 mg total) by mouth once daily.     folic acid 1 MG tablet  Commonly known as: FOLVITE  Take 1 tablet (1 mg total) by mouth once daily.     multivitamin Tab  Take 1 tablet by mouth once daily.     sodium bicarbonate 650 MG tablet  Take 1 tablet (650 mg total) by mouth 3 (three) times daily. for 7 days            CHANGE how you take these medications      atorvastatin 20 MG tablet  Commonly known as: LIPITOR  What changed: Another medication with the same name was removed. Continue taking this medication, and follow the directions you see here.     furosemide 40 MG tablet  Commonly known as: LASIX  Take 0.5 tablets (20 mg total) by mouth daily as needed (Swelling, shortness of breath, rapid weight gain of 2 lb overnight).  What changed: See the new instructions.     glipiZIDE 5 MG Tr24  Take 1 tablet (5 mg total) by mouth daily with breakfast.  What changed:   medication strength  how much to take  when to take this            CONTINUE taking these medications      alfuzosin 10 mg Tb24  Commonly known as: UROXATRAL      ARIPiprazole 5 MG Tab  Commonly known as: ABILIFY     aspirin 81 MG EC tablet  Commonly known as: ECOTRIN     DAYVIGO 5 mg Tab  Generic drug: lemborexant     divalproex 125 MG EC tablet  Commonly known as: DEPAKOTE     fenofibrate 145 MG tablet  Commonly known as: TRICOR     INVEGA SUSTENNA 117 mg/0.75 mL Syrg injection  Generic drug: paliperidone palmitate     levothyroxine 25 MCG tablet  Commonly known as: SYNTHROID     pantoprazole 40 MG tablet  Commonly known as: PROTONIX     PROPRANOLOL ORAL     TRINTELLIX 20 mg Tab  Generic drug: vortioxetine     ursodioL 300 mg capsule  Commonly known as: ACTIGALL            STOP taking these medications      diclofenac 75 MG EC tablet  Commonly known as: VOLTAREN     metFORMIN 1000 MG tablet  Commonly known as: GLUCOPHAGE               Where to Get Your Medications        These medications were sent to [facility] - [place], [place] - [address]  [address], [place] [address]      Phone: [phone]   ferrous sulfate 325 (65 FE) MG EC tablet  folic acid 1 MG tablet  glipiZIDE 5 MG Tr24  multivitamin Tab  sodium bicarbonate 650 MG tablet       Information about where to get these medications is not yet available    Ask your nurse or doctor about these medications  furosemide 40 MG tablet          Explained in detail to the patient about the discharge plan, medications, and follow-up visits. Pt understands and agrees with the treatment plan  Discharge Disposition:     Discharged Condition: stable  Diet-   Dietary Orders (From admission, onward)       Start     Ordered    05/06/23 1728  Diet Adult Regular  Diet effective now         05/06/23 1727                   Medications Per DC med rec  Activities as tolerated   Follow-up Information       Nimco Sarmiento MD Follow up in 2 week(s).    Specialty: Nephrology  Contact information:  [address]  [facility] [address] [phone]               Malik HAMM  MD [place] Follow up in 1 week(s).    Specialty: Family Medicine  Contact information:  [address]  [place] [id number] [phone]               Repeat CBC/CMP in 1 week Follow up.               Patient needs outpatient hematology appointment in 2 weeks Follow up.                           For further questions contact hospitalist office    Discharge time 33 minutes    For worsening symptoms, chest pain, shortness of breath, increased abdominal pain, high grade fever, stroke or stroke like symptoms, immediately go to the nearest Emergency Room or call 911 as soon as possible.      Alexa Marti M.D, on 5/12/2023. at 7:47 AM.

## 2023-05-12 NOTE — PLAN OF CARE
05/12/23 0819   Final Note   Assessment Type Final Discharge Note   Anticipated Discharge Disposition Home   Post-Acute Status   Discharge Delays None known at this time     Spoke with Renetta at [facility] [phone] about plans for dc today. Transportation will pickup around 11:30am.   Clinicals and AVS faxed to [facility] office at [phone]  AVS/Med Rec faxed to [facility] [phone]  Referral sent to [facility] via [employer].

## 2023-05-15 ENCOUNTER — PATIENT OUTREACH (OUTPATIENT)
Dept: ADMINISTRATIVE | Facility: CLINIC | Age: 48
End: 2023-05-15
Payer: MEDICARE

## 2023-05-15 NOTE — PROGRESS NOTES
C3 nurse spoke with Isi Cagle @ [facility] [employer] for a TCC post hospital discharge follow up call. Stated will give home manager Allegra message to return call. Call back number provided. The patient has a scheduled HOSFU appointment with Malik Zavala MD  on 05/19/2023 @ 215 pm.  Appointment with Dr. Sarmiento on 06/01/2023 @ 830 am.

## 2023-05-15 NOTE — PROGRESS NOTES
C3 nurse spoke with Home Manager for a TCC post hospital discharge follow up call. Stated will call back. The patient has a scheduled HOSFU appointment with Malik Zavala MD  on 05/19/2023 @ 215 pm.  Appointment with Dr. Sarmiento on 06/01/2023 @ 830 am.

## 2023-05-16 NOTE — PROGRESS NOTES
C3 nurse spoke with [facility] for a TCC post hospital discharge follow up call. Stated will give home manager Philiplico message to return call. Call back number provided. The patient has a scheduled HOSFU appointment with Malik Zavala MD  on 05/19/2023 @ 215 pm.  Appointment with Dr. Sarmiento on 06/01/2023 @ 830 am.

## 2023-06-06 ENCOUNTER — OFFICE VISIT (OUTPATIENT)
Dept: HEMATOLOGY/ONCOLOGY | Facility: CLINIC | Age: 48
End: 2023-06-06
Payer: MEDICARE

## 2023-06-06 VITALS
DIASTOLIC BLOOD PRESSURE: 84 MMHG | OXYGEN SATURATION: 96 % | BODY MASS INDEX: 30.1 KG/M2 | HEART RATE: 75 BPM | RESPIRATION RATE: 20 BRPM | HEIGHT: 64 IN | TEMPERATURE: 98 F | SYSTOLIC BLOOD PRESSURE: 139 MMHG | WEIGHT: 176.31 LBS

## 2023-06-06 DIAGNOSIS — D53.9 MACROCYTIC ANEMIA: ICD-10-CM

## 2023-06-06 DIAGNOSIS — D64.9 ANEMIA, UNSPECIFIED TYPE: ICD-10-CM

## 2023-06-06 DIAGNOSIS — N17.9 AKI (ACUTE KIDNEY INJURY): ICD-10-CM

## 2023-06-06 PROCEDURE — 99205 OFFICE O/P NEW HI 60 MIN: CPT | Mod: ,,, | Performed by: INTERNAL MEDICINE

## 2023-06-06 PROCEDURE — 99205 PR OFFICE/OUTPT VISIT, NEW, LEVL V, 60-74 MIN: ICD-10-PCS | Mod: ,,, | Performed by: INTERNAL MEDICINE

## 2023-06-06 NOTE — PROGRESS NOTES
[facility]  Hematology/Oncology  Consult Note    Patient Name: Bacilio Arguelles  MRN: [id number]  Admission Date: (Not on file)  Hospital Length of Stay: 0 days  Code Status: [unfilled]   Attending Provider:    Consulting Provider: Perry Munoz MD  Primary Care Physician: Malik Zavala MD  Principal Problem:  Anemia slowly resolving secondary to an episode of CARMEL (acute kidney injury)    Consults  Subjective:   HPI:  Mr. Arguelles is 47 years old, she was referred to us by Dr. Sarmiento, a nephrologist at [facility], for evaluation of anemia.  Last month the patient arrived to [facility] with an abnormal kidney function, and elevated CPK enzymes, consistent with rhabdomyolysis.  The reason for this event is not clear.  He was on NSAIDs, he apparently became with very high CPK levels   dehydrated, he now is supposed to drink a glasses of water a day, and his creatinine and other measures her renal function were appeared to be improving.  He sent us to evaluate the cause of the anemia, which is probable secondary to the acute kidney injury.  The patient has an intellectual disability.  He has been at an institution call [facility] for the past 3 years.  His family lives in [place] his father recently die.  He is cared by a very pleasant lady named Mandi, who brought him from the current institution which is [facility] .  The patient significant disability preventing him from given any history.  He is only rocking back and forth answers yes or no to questions.  He does not smoke or drink.    Medications:  Continuous Infusions:  Scheduled Meds:  PRN Meds:     Review of patient's allergies indicates:  No Known Allergies     Past Medical History:   Diagnosis Date    Anxiety disorder, unspecified     Depression     DM (diabetes mellitus), type 2     GERD (gastroesophageal reflux disease)     HLD (hyperlipidemia)     Hypothyroidism, unspecified      Intellectual disability     Mood disorder     Seizure disorder      Past Surgical History:   Procedure Laterality Date    ARTHROPLASTY,HIP,TOTAL, BILATERAL, POSTERIOR APPROACH Bilateral      Family History    None       Tobacco Use    Smoking status: Never    Smokeless tobacco: Never   Substance and Sexual Activity    Alcohol use: Never    Drug use: Never    Sexual activity: Not Currently       Review of Systems ; all systems noncontributory  Objective:     Vital Signs (Most Recent):  Temp: 97.9 °F (36.6 °C) (06/06/23 1436)  Pulse: 75 (06/06/23 1436)  Resp: 20 (06/06/23 1436)  BP: 139/84 (06/06/23 1436)  SpO2: 96 % (06/06/23 1436) Vital Signs (24h Range):  [unfilled]     Weight: 80 kg (176 lb 4.8 oz)  Body mass index is 30.26 kg/m².  Body surface area is 1.9 meters squared.    Physical Exam  Constitutional:       Appearance: He is obese.   HENT:      Head: Normocephalic and atraumatic.   Cardiovascular:      Rate and Rhythm: Normal rate and regular rhythm.   Pulmonary:      Effort: Pulmonary effort is normal.   Abdominal:      General: Abdomen is flat.      Palpations: Abdomen is soft.   Musculoskeletal:         General: Normal range of motion.      Cervical back: Normal range of motion.   Neurological:      General: No focal deficit present.      Mental Status: He is disoriented.   Psychiatric:      Comments: The patient's intellectual disability prevents me to make an informed decision about his thought content judgment mood or behavior, during this visit which was a referral for anemia     Significant Labs:   Significant Labs obtained on May 5, 2023 showed the following:  CMP BUN 27 creatinine 2.44, calcium 9.5 albumin 2.3, AST elevated 74 alkaline phosphatase and ALT are normal.  INR is normal in size the BNP.  Albumin also dated 05/05/2023 is low at 2.9, bilirubin 0.3..  CBC on the same day 05/05/2023 open 8.8, hematocrit 28% MCV was 103.  Differential revealed 46% neutrophils ANC of 2000, .  Platelets  228K      Diagnostic Results:  No results of imaging enclosed.      Assessment/Plan:   Anemia, most likely secondary to rhabdomyolysis, leading to acute kidney injury.  Residual CKD, at least based on his latest lab of May 5, 2023..  The patient was sent to the lab for a comprehensive anemia workup, to exclude various deficiency, including B12, folic acid, iron studies, and a hemolytic process.  We will send the records his physician, [facility]         Thank you for your consult.     Perry Munoz MD  Hematology/Oncology  [facility]

## 2023-08-14 PROBLEM — N17.9 AKI (ACUTE KIDNEY INJURY): Status: RESOLVED | Noted: 2023-05-12 | Resolved: 2023-08-14

## 2024-05-02 ENCOUNTER — TELEPHONE (OUTPATIENT)
Dept: HEMATOLOGY/ONCOLOGY | Facility: CLINIC | Age: 49
End: 2024-05-02
Payer: MEDICARE

## 2024-05-02 NOTE — TELEPHONE ENCOUNTER
Renetta with [facility] called in reference to Mr. Arguelles needing a f/u appt or not. He saw Dr. Munoz 6/6/2023 as a new patient. Renetta is wondering if a f/u is needed she stated the notes did not specify. Please advise     Assessment/Plan:   Anemia, most likely secondary to rhabdomyolysis, leading to acute kidney injury.  Residual CKD, at least based on his latest lab of May 5, 2023..  The patient was sent to the lab for a comprehensive anemia workup, to exclude various deficiency, including B12, folic acid, iron studies, and a hemolytic process.  We will send the records his physician, @ [facility]

## 2024-06-21 DIAGNOSIS — R56.9 SEIZURES: Primary | ICD-10-CM

## 2025-01-23 PROBLEM — G40.219 LOCALIZATION-RELATED (FOCAL) (PARTIAL) SYMPTOMATIC EPILEPSY AND EPILEPTIC SYNDROMES WITH COMPLEX PARTIAL SEIZURES, INTRACTABLE, WITHOUT STATUS EPILEPTICUS: Status: ACTIVE | Noted: 2025-01-23

## 2025-04-15 ENCOUNTER — HOSPITAL ENCOUNTER (INPATIENT)
Facility: HOSPITAL | Age: 50
LOS: 7 days | Discharge: HOME | DRG: 228 | End: 2025-04-22
Attending: INTERNAL MEDICINE | Admitting: INTERNAL MEDICINE
Payer: MEDICARE

## 2025-04-15 DIAGNOSIS — R00.0 TACHYCARDIA: ICD-10-CM

## 2025-04-15 DIAGNOSIS — I25.10 CAD (CORONARY ARTERY DISEASE): ICD-10-CM

## 2025-04-15 DIAGNOSIS — Z95.0 PACEMAKER ECG PATTERN: ICD-10-CM

## 2025-04-15 DIAGNOSIS — I44.2 COMPLETE HEART BLOCK: ICD-10-CM

## 2025-04-15 DIAGNOSIS — I44.2 AV BLOCK, 3RD DEGREE: ICD-10-CM

## 2025-04-15 DIAGNOSIS — Z95.0 PACEMAKER: ICD-10-CM

## 2025-04-15 LAB
ACCEPTIBLE SP GR UR QL: 1.03 (ref 1–1.03)
ALBUMIN SERPL-MCNC: 2.3 G/DL (ref 3.5–5)
ALBUMIN/GLOB SERPL: 0.5 RATIO (ref 1.1–2)
ALLENS TEST BLOOD GAS (OHS): YES
ALP SERPL-CCNC: 77 UNIT/L (ref 40–150)
ALT SERPL-CCNC: 18 UNIT/L (ref 0–55)
AMMONIA PLAS-MSCNC: 40.9 UMOL/L (ref 18–72)
AMPHET UR QL SCN: NEGATIVE
ANION GAP SERPL CALC-SCNC: 8 MEQ/L
APICAL FOUR CHAMBER EJECTION FRACTION: 59 %
APICAL TWO CHAMBER EJECTION FRACTION: 64 %
AST SERPL-CCNC: 55 UNIT/L (ref 11–45)
AV INDEX (PROSTH): 0.58
AV MEAN GRADIENT: 6 MMHG
AV PEAK GRADIENT: 12 MMHG
AV VALVE AREA BY VELOCITY RATIO: 2 CM²
AV VALVE AREA: 2.2 CM²
AV VELOCITY RATIO: 0.53
B PERT.PT PRMT NPH QL NAA+NON-PROBE: NOT DETECTED
BACTERIA #/AREA URNS AUTO: ABNORMAL /HPF
BARBITURATE SCN PRESENT UR: NEGATIVE
BASE EXCESS BLD CALC-SCNC: -5.8 MMOL/L (ref -2–2)
BASOPHILS # BLD AUTO: 0.02 X10(3)/MCL
BASOPHILS NFR BLD AUTO: 0.2 %
BENZODIAZ UR QL SCN: POSITIVE
BILIRUB SERPL-MCNC: 0.8 MG/DL
BILIRUB UR QL STRIP.AUTO: NEGATIVE
BLOOD GAS SAMPLE TYPE (OHS): ABNORMAL
BNP BLD-MCNC: 175.3 PG/ML
BSA FOR ECHO PROCEDURE: 1.9 M2
BUN SERPL-MCNC: 48.4 MG/DL (ref 8.9–20.6)
C PNEUM DNA NPH QL NAA+NON-PROBE: NOT DETECTED
CA-I BLD-SCNC: 1.18 MMOL/L (ref 1.12–1.23)
CALCIUM SERPL-MCNC: 8.6 MG/DL (ref 8.4–10.2)
CANNABINOIDS UR QL SCN: NEGATIVE
CHLORIDE SERPL-SCNC: 110 MMOL/L (ref 98–107)
CLARITY UR: CLEAR
CO2 BLDA-SCNC: 20.6 MMOL/L
CO2 SERPL-SCNC: 21 MMOL/L (ref 22–29)
COCAINE UR QL SCN: NEGATIVE
COHGB MFR BLDA: 1.4 % (ref 0.5–1.5)
COLOR UR AUTO: YELLOW
CREAT SERPL-MCNC: 2.14 MG/DL (ref 0.72–1.25)
CREAT/UREA NIT SERPL: 23
CV ECHO LV RWT: 0.8 CM
DOP CALC AO PEAK VEL: 1.7 M/S
DOP CALC AO VTI: 30.5 CM
DOP CALC LVOT AREA: 3.8 CM2
DOP CALC LVOT DIAMETER: 2.2 CM
DOP CALC LVOT PEAK VEL: 0.9 M/S
DOP CALC LVOT STROKE VOLUME: 66.9 CM3
DOP CALC MV VTI: 26.8 CM
DOP CALCLVOT PEAK VEL VTI: 17.6 CM
DRAWN BY BLOOD GAS (OHS): ABNORMAL
E WAVE DECELERATION TIME: 203 MSEC
E/A RATIO: 0.96
E/E' RATIO: 9 M/S
ECHO LV POSTERIOR WALL: 1.4 CM (ref 0.6–1.1)
EOSINOPHIL # BLD AUTO: 0 X10(3)/MCL (ref 0–0.9)
EOSINOPHIL NFR BLD AUTO: 0 %
ERYTHROCYTE [DISTWIDTH] IN BLOOD BY AUTOMATED COUNT: 15.9 % (ref 11.5–17)
FENTANYL UR QL SCN: NEGATIVE
FRACTIONAL SHORTENING: 25.7 % (ref 28–44)
GFR SERPLBLD CREATININE-BSD FMLA CKD-EPI: 37 ML/MIN/1.73/M2
GLOBULIN SER-MCNC: 4.2 GM/DL (ref 2.4–3.5)
GLUCOSE SERPL-MCNC: 153 MG/DL (ref 74–100)
GLUCOSE UR QL STRIP: ABNORMAL
HADV DNA NPH QL NAA+NON-PROBE: NOT DETECTED
HCO3 BLDA-SCNC: 19.5 MMOL/L (ref 22–26)
HCOV 229E RNA NPH QL NAA+NON-PROBE: NOT DETECTED
HCOV HKU1 RNA NPH QL NAA+NON-PROBE: NOT DETECTED
HCOV NL63 RNA NPH QL NAA+NON-PROBE: NOT DETECTED
HCOV OC43 RNA NPH QL NAA+NON-PROBE: NOT DETECTED
HCT VFR BLD AUTO: 39.5 % (ref 42–52)
HGB BLD-MCNC: 12.5 G/DL (ref 14–18)
HGB UR QL STRIP: ABNORMAL
HMPV RNA NPH QL NAA+NON-PROBE: NOT DETECTED
HPIV1 RNA NPH QL NAA+NON-PROBE: NOT DETECTED
HPIV2 RNA NPH QL NAA+NON-PROBE: NOT DETECTED
HPIV3 RNA NPH QL NAA+NON-PROBE: NOT DETECTED
HPIV4 RNA NPH QL NAA+NON-PROBE: NOT DETECTED
IMM GRANULOCYTES # BLD AUTO: 0.04 X10(3)/MCL (ref 0–0.04)
IMM GRANULOCYTES NFR BLD AUTO: 0.5 %
INHALED O2 CONCENTRATION: 30 %
INTERVENTRICULAR SEPTUM: 1.1 CM (ref 0.6–1.1)
KETONES UR QL STRIP: ABNORMAL
LACTATE SERPL-SCNC: 2 MMOL/L (ref 0.5–2.2)
LEFT ATRIUM AREA SYSTOLIC (APICAL 2 CHAMBER): 7.14 CM2
LEFT ATRIUM AREA SYSTOLIC (APICAL 4 CHAMBER): 9.11 CM2
LEFT ATRIUM SIZE: 2.2 CM
LEFT ATRIUM VOLUME INDEX MOD: 8 ML/M2
LEFT ATRIUM VOLUME MOD: 14 ML
LEFT INTERNAL DIMENSION IN SYSTOLE: 2.6 CM (ref 2.1–4)
LEFT VENTRICLE DIASTOLIC VOLUME INDEX: 27.57 ML/M2
LEFT VENTRICLE DIASTOLIC VOLUME: 51 ML
LEFT VENTRICLE END DIASTOLIC VOLUME APICAL 2 CHAMBER: 92.7 ML
LEFT VENTRICLE END DIASTOLIC VOLUME APICAL 4 CHAMBER: 76.3 ML
LEFT VENTRICLE END SYSTOLIC VOLUME APICAL 2 CHAMBER: 10.7 ML
LEFT VENTRICLE END SYSTOLIC VOLUME APICAL 4 CHAMBER: 16.4 ML
LEFT VENTRICLE MASS INDEX: 78.2 G/M2
LEFT VENTRICLE SYSTOLIC VOLUME INDEX: 13.5 ML/M2
LEFT VENTRICLE SYSTOLIC VOLUME: 25 ML
LEFT VENTRICULAR INTERNAL DIMENSION IN DIASTOLE: 3.5 CM (ref 3.5–6)
LEFT VENTRICULAR MASS: 144.6 G
LEUKOCYTE ESTERASE UR QL STRIP: 25
LV LATERAL E/E' RATIO: 9.1 M/S
LV SEPTAL E/E' RATIO: 9.1 M/S
LVED V (TEICH): 50.87 ML
LVES V (TEICH): 24.61 ML
LVOT MG: 2 MMHG
LVOT MV: 0.57 CM/S
LYMPHOCYTES # BLD AUTO: 1.21 X10(3)/MCL (ref 0.6–4.6)
LYMPHOCYTES NFR BLD AUTO: 13.9 %
M PNEUMO DNA NPH QL NAA+NON-PROBE: NOT DETECTED
MAGNESIUM SERPL-MCNC: 1.6 MG/DL (ref 1.6–2.6)
MCH RBC QN AUTO: 32.9 PG (ref 27–31)
MCHC RBC AUTO-ENTMCNC: 31.6 G/DL (ref 33–36)
MCV RBC AUTO: 103.9 FL (ref 80–94)
MDMA UR QL SCN: POSITIVE
METHGB MFR BLDA: 0.9 % (ref 0.4–1.5)
MODE (OHS): ABNORMAL
MONOCYTES # BLD AUTO: 0.65 X10(3)/MCL (ref 0.1–1.3)
MONOCYTES NFR BLD AUTO: 7.5 %
MV MEAN GRADIENT: 2 MMHG
MV PEAK A VEL: 0.76 M/S
MV PEAK E VEL: 0.73 M/S
MV PEAK GRADIENT: 7 MMHG
MV STENOSIS PRESSURE HALF TIME: 47 MS
MV VALVE AREA BY CONTINUITY EQUATION: 2.5 CM2
MV VALVE AREA P 1/2 METHOD: 4.68 CM2
NEUTROPHILS # BLD AUTO: 6.79 X10(3)/MCL (ref 2.1–9.2)
NEUTROPHILS NFR BLD AUTO: 77.9 %
NITRITE UR QL STRIP: NEGATIVE
NRBC BLD AUTO-RTO: 0 %
O2 HB BLOOD GAS (OHS): 94.9 % (ref 94–97)
OHS LV EJECTION FRACTION SIMPSONS BIPLANE MOD: 63 %
OHS QRS DURATION: 156 MS
OHS QTC CALCULATION: 438 MS
OPIATES UR QL SCN: NEGATIVE
OXYHGB MFR BLDA: 13.9 G/DL (ref 12–16)
PCO2 BLDA: 37 MMHG (ref 35–45)
PCP UR QL: NEGATIVE
PEEP RESPIRATORY: 5 CMH2O
PH BLDA: 7.33 [PH] (ref 7.35–7.45)
PH UR STRIP: 6.5 [PH]
PH UR: 6.5 [PH] (ref 3–11)
PHOSPHATE SERPL-MCNC: 3.1 MG/DL (ref 2.3–4.7)
PISA TR MAX VEL: 1 M/S
PLATELET # BLD AUTO: 104 X10(3)/MCL (ref 130–400)
PMV BLD AUTO: 9.6 FL (ref 7.4–10.4)
PO2 BLDA: 87 MMHG (ref 80–100)
POTASSIUM BLOOD GAS (OHS): 4 MMOL/L (ref 3.5–5)
POTASSIUM SERPL-SCNC: 5.2 MMOL/L (ref 3.5–5.1)
PROCALCITONIN SERPL-MCNC: 0.82 NG/ML
PROT SERPL-MCNC: 6.5 GM/DL (ref 6.4–8.3)
PROT UR QL STRIP: NEGATIVE
PS (OHS): 10 CMH2O
PV PEAK GRADIENT: 4 MMHG
PV PEAK VELOCITY: 0.98 M/S
RBC # BLD AUTO: 3.8 X10(6)/MCL (ref 4.7–6.1)
RBC #/AREA URNS AUTO: ABNORMAL /HPF
RSV RNA NPH QL NAA+NON-PROBE: NOT DETECTED
RV+EV RNA NPH QL NAA+NON-PROBE: NOT DETECTED
SAMPLE SITE BLOOD GAS (OHS): ABNORMAL
SAO2 % BLDA: 95.9 %
SODIUM BLOOD GAS (OHS): 136 MMOL/L (ref 137–145)
SODIUM SERPL-SCNC: 139 MMOL/L (ref 136–145)
SP GR UR STRIP.AUTO: 1.03 (ref 1–1.03)
SQUAMOUS #/AREA URNS LPF: ABNORMAL /HPF
TDI LATERAL: 0.08 M/S
TDI SEPTAL: 0.08 M/S
TDI: 0.08 M/S
TR MAX PG: 4 MMHG
TRICUSPID ANNULAR PLANE SYSTOLIC EXCURSION: 1.83 CM
TSH SERPL-ACNC: 1.1 UIU/ML (ref 0.35–4.94)
UROBILINOGEN UR STRIP-ACNC: 2
WBC # BLD AUTO: 8.71 X10(3)/MCL (ref 4.5–11.5)
WBC #/AREA URNS AUTO: ABNORMAL /HPF
Z-SCORE OF LEFT VENTRICULAR DIMENSION IN END DIASTOLE: -3.84
Z-SCORE OF LEFT VENTRICULAR DIMENSION IN END SYSTOLE: -1.57

## 2025-04-15 PROCEDURE — 63600175 PHARM REV CODE 636 W HCPCS: Performed by: NURSE PRACTITIONER

## 2025-04-15 PROCEDURE — 87086 URINE CULTURE/COLONY COUNT: CPT | Performed by: INTERNAL MEDICINE

## 2025-04-15 PROCEDURE — 27200966 HC CLOSED SUCTION SYSTEM

## 2025-04-15 PROCEDURE — 93005 ELECTROCARDIOGRAM TRACING: CPT

## 2025-04-15 PROCEDURE — 25000003 PHARM REV CODE 250: Performed by: INTERNAL MEDICINE

## 2025-04-15 PROCEDURE — 87040 BLOOD CULTURE FOR BACTERIA: CPT | Performed by: INTERNAL MEDICINE

## 2025-04-15 PROCEDURE — 84145 PROCALCITONIN (PCT): CPT | Performed by: INTERNAL MEDICINE

## 2025-04-15 PROCEDURE — 27100171 HC OXYGEN HIGH FLOW UP TO 24 HOURS

## 2025-04-15 PROCEDURE — 94760 N-INVAS EAR/PLS OXIMETRY 1: CPT | Mod: XB

## 2025-04-15 PROCEDURE — 80053 COMPREHEN METABOLIC PANEL: CPT | Performed by: INTERNAL MEDICINE

## 2025-04-15 PROCEDURE — 63600175 PHARM REV CODE 636 W HCPCS: Performed by: INTERNAL MEDICINE

## 2025-04-15 PROCEDURE — 36600 WITHDRAWAL OF ARTERIAL BLOOD: CPT

## 2025-04-15 PROCEDURE — 99900035 HC TECH TIME PER 15 MIN (STAT)

## 2025-04-15 PROCEDURE — 85025 COMPLETE CBC W/AUTO DIFF WBC: CPT | Performed by: INTERNAL MEDICINE

## 2025-04-15 PROCEDURE — 84443 ASSAY THYROID STIM HORMONE: CPT | Performed by: INTERNAL MEDICINE

## 2025-04-15 PROCEDURE — 94799 UNLISTED PULMONARY SVC/PX: CPT

## 2025-04-15 PROCEDURE — 84100 ASSAY OF PHOSPHORUS: CPT | Performed by: INTERNAL MEDICINE

## 2025-04-15 PROCEDURE — 94761 N-INVAS EAR/PLS OXIMETRY MLT: CPT | Mod: XB

## 2025-04-15 PROCEDURE — 80307 DRUG TEST PRSMV CHEM ANLYZR: CPT | Performed by: INTERNAL MEDICINE

## 2025-04-15 PROCEDURE — 20000000 HC ICU ROOM

## 2025-04-15 PROCEDURE — 25500020 PHARM REV CODE 255: Performed by: INTERNAL MEDICINE

## 2025-04-15 PROCEDURE — 83880 ASSAY OF NATRIURETIC PEPTIDE: CPT | Performed by: INTERNAL MEDICINE

## 2025-04-15 PROCEDURE — 81001 URINALYSIS AUTO W/SCOPE: CPT | Performed by: INTERNAL MEDICINE

## 2025-04-15 PROCEDURE — 94002 VENT MGMT INPAT INIT DAY: CPT

## 2025-04-15 PROCEDURE — 99900031 HC PATIENT EDUCATION (STAT)

## 2025-04-15 PROCEDURE — 99900026 HC AIRWAY MAINTENANCE (STAT)

## 2025-04-15 PROCEDURE — 5A1945Z RESPIRATORY VENTILATION, 24-96 CONSECUTIVE HOURS: ICD-10-PCS | Performed by: INTERNAL MEDICINE

## 2025-04-15 PROCEDURE — 5A1223Z PERFORMANCE OF CARDIAC PACING, CONTINUOUS: ICD-10-PCS | Performed by: INTERNAL MEDICINE

## 2025-04-15 PROCEDURE — 36415 COLL VENOUS BLD VENIPUNCTURE: CPT | Performed by: INTERNAL MEDICINE

## 2025-04-15 PROCEDURE — 83735 ASSAY OF MAGNESIUM: CPT | Performed by: INTERNAL MEDICINE

## 2025-04-15 PROCEDURE — 87632 RESP VIRUS 6-11 TARGETS: CPT | Performed by: INTERNAL MEDICINE

## 2025-04-15 PROCEDURE — 93010 ELECTROCARDIOGRAM REPORT: CPT | Mod: ,,, | Performed by: INTERNAL MEDICINE

## 2025-04-15 PROCEDURE — 82803 BLOOD GASES ANY COMBINATION: CPT

## 2025-04-15 PROCEDURE — 82140 ASSAY OF AMMONIA: CPT | Performed by: INTERNAL MEDICINE

## 2025-04-15 PROCEDURE — 83605 ASSAY OF LACTIC ACID: CPT | Performed by: INTERNAL MEDICINE

## 2025-04-15 RX ORDER — SODIUM CHLORIDE 0.9 % (FLUSH) 0.9 %
10 SYRINGE (ML) INJECTION
Status: DISCONTINUED | OUTPATIENT
Start: 2025-04-15 | End: 2025-04-23 | Stop reason: HOSPADM

## 2025-04-15 RX ORDER — SODIUM CHLORIDE, SODIUM LACTATE, POTASSIUM CHLORIDE, CALCIUM CHLORIDE 600; 310; 30; 20 MG/100ML; MG/100ML; MG/100ML; MG/100ML
INJECTION, SOLUTION INTRAVENOUS CONTINUOUS
Status: DISCONTINUED | OUTPATIENT
Start: 2025-04-15 | End: 2025-04-19

## 2025-04-15 RX ORDER — PROPOFOL 10 MG/ML
0-50 INJECTION, EMULSION INTRAVENOUS CONTINUOUS
Status: DISCONTINUED | OUTPATIENT
Start: 2025-04-15 | End: 2025-04-20

## 2025-04-15 RX ORDER — GLUCAGON 1 MG
1 KIT INJECTION
Status: DISCONTINUED | OUTPATIENT
Start: 2025-04-15 | End: 2025-04-23 | Stop reason: HOSPADM

## 2025-04-15 RX ORDER — ARIPIPRAZOLE 5 MG/1
5 TABLET ORAL DAILY
Status: DISCONTINUED | OUTPATIENT
Start: 2025-04-15 | End: 2025-04-17

## 2025-04-15 RX ORDER — INSULIN ASPART 100 [IU]/ML
0-5 INJECTION, SOLUTION INTRAVENOUS; SUBCUTANEOUS EVERY 6 HOURS PRN
Status: DISCONTINUED | OUTPATIENT
Start: 2025-04-15 | End: 2025-04-23 | Stop reason: HOSPADM

## 2025-04-15 RX ORDER — MAGNESIUM SULFATE HEPTAHYDRATE 40 MG/ML
2 INJECTION, SOLUTION INTRAVENOUS ONCE
Status: COMPLETED | OUTPATIENT
Start: 2025-04-15 | End: 2025-04-15

## 2025-04-15 RX ORDER — MUPIROCIN 20 MG/G
OINTMENT TOPICAL 2 TIMES DAILY
Status: COMPLETED | OUTPATIENT
Start: 2025-04-15 | End: 2025-04-20

## 2025-04-15 RX ORDER — ASPIRIN 81 MG/1
81 TABLET ORAL DAILY
Status: DISCONTINUED | OUTPATIENT
Start: 2025-04-15 | End: 2025-04-17

## 2025-04-15 RX ADMIN — ARIPIPRAZOLE 5 MG: 5 TABLET ORAL at 03:04

## 2025-04-15 RX ADMIN — MUPIROCIN: 20 OINTMENT TOPICAL at 09:04

## 2025-04-15 RX ADMIN — PROPOFOL 40 MCG/KG/MIN: 10 INJECTION, EMULSION INTRAVENOUS at 07:04

## 2025-04-15 RX ADMIN — PROPOFOL 40 MCG/KG/MIN: 10 INJECTION, EMULSION INTRAVENOUS at 01:04

## 2025-04-15 RX ADMIN — ASPIRIN 81 MG: 81 TABLET, COATED ORAL at 03:04

## 2025-04-15 RX ADMIN — PERFLUTREN 1 ML: 6.52 INJECTION, SUSPENSION INTRAVENOUS at 04:04

## 2025-04-15 RX ADMIN — PROPOFOL 40 MCG/KG/MIN: 10 INJECTION, EMULSION INTRAVENOUS at 04:04

## 2025-04-15 RX ADMIN — MAGNESIUM SULFATE HEPTAHYDRATE 2 G: 40 INJECTION, SOLUTION INTRAVENOUS at 03:04

## 2025-04-15 RX ADMIN — MAGNESIUM SULFATE HEPTAHYDRATE 2 G: 40 INJECTION, SOLUTION INTRAVENOUS at 05:04

## 2025-04-15 NOTE — H&P
[facility] - 7th Floor ICU  Pulmonary Critical Care Note    Patient Name: Bacilio Arguelles  MRN: [id number]  Admission Date: 4/15/2025  Hospital Length of Stay: 0 days  Code Status: Full Code  Attending Provider: Nj Becerra MD  Primary Care Provider: Malik Zavala MD     Subjective:     HPI:   Fortunately old male with past medical history of non-insulin-dependent type 2 diabetes mellitus, hypothyroidism, seizure disorder, unspecified psychiatric disorder, intellectual disability transferred from [facility] for permanent pacemaker placement.  Patient passed out and was unresponsive for 2 minutes at group home, with heart rate ranging between 20s- 30s, given atropine, in the ER he was found to be in complete heart block, later went into asystole, ROSC achieved 2 minutes post CPR.  Immediately taken to cath lab and had a transvenous pacemaker placed and then admitted to ICU.  Per chart review, not on any AV germania blocking agents, no reversible causes identified, transferred to evaluate/need for permanent pacemaker placement.  No history is obtainable at this time as patient is intubated and sedated.    Past Medical History:   Diagnosis Date    Anxiety disorder, unspecified     Depression     DM (diabetes mellitus), type 2     GERD (gastroesophageal reflux disease)     HLD (hyperlipidemia)     Hypothyroidism, unspecified     Intellectual disability     Mood disorder     Seizure disorder        Past Surgical History:   Procedure Laterality Date    ARTHROPLASTY,HIP,TOTAL, BILATERAL, POSTERIOR APPROACH Bilateral        Social History[1]        Current Outpatient Medications   Medication Instructions    alfuzosin (UROXATRAL) 10 mg, Oral, With dinner    ARIPiprazole (ABILIFY) 5 mg, Oral    aspirin (ECOTRIN) 81 MG EC tablet Aspir-81 mg tablet,delayed release   Take 1 tablet every day by oral route.    atorvastatin (LIPITOR) 20 MG tablet atorvastatin 20 mg tablet    DAYVIGO 5 mg Tab 1  tablet, Oral    divalproex (DEPAKOTE) 125 mg, Oral, Nightly    fenofibrate (TRICOR) 145 MG tablet fenofibrate nanocrystallized 145 mg tablet    folic acid (FOLVITE) 1 mg, Oral, Daily    furosemide (LASIX) 20 mg, Oral, Daily PRN    glipiZIDE 5 mg, Oral, With breakfast    levothyroxine (SYNTHROID) 25 MCG tablet levothyroxine 25 mcg tablet    paliperidone palmitate (INVEGA SUSTENNA) 117 mg/0.75 mL Syrg injection Invega Sustenna 117 mg/0.75 mL intramuscular syringe    pantoprazole (PROTONIX) 40 mg, Oral    propranolol HCl (PROPRANOLOL ORAL) 5 mg, Oral, 2 times daily    sodium bicarbonate 650 mg, Oral, 3 times daily    TRINTELLIX 20 mg Tab 1 tablet, Oral    ursodioL (ACTIGALL) 300 mg capsule Oral, 3 times daily       Review of patient's allergies indicates:  No Known Allergies     Current Inpatient Medications   mupirocin   Nasal BID       Current Intravenous Infusions   lactated ringers   Intravenous Continuous        propofoL  0-50 mcg/kg/min Intravenous Continuous 19.2 mL/hr at 04/15/25 1330 40 mcg/kg/min at 04/15/25 1330         Review of Systems   Constitutional:  Positive for fever.      Unable to do review of systems as patient is intubated and sedated.    Objective:     No intake or output data in the 24 hours ending 04/15/25 1339      Vital Signs (Most Recent):  Temp: (!) 101 °F (38.3 °C) (04/15/25 1200)  Pulse: 61 (04/15/25 1230)  Resp: 14 (04/15/25 1230)  BP: 91/60 (04/15/25 1230)  SpO2: 99 % (04/15/25 1230)  Body mass index is 30.2 kg/m².  Weight: 79.8 kg (175 lb 14.8 oz) Vital Signs (24h Range):  Temp:  [101 °F (38.3 °C)] 101 °F (38.3 °C)  Pulse:  [58-62] 61  Resp:  [13-20] 14  SpO2:  [98 %-100 %] 99 %  BP: (91)/(60) 91/60     Physical Exam  Vital signs and nursing notes reviewed.  Constitutional: NAD.  Intubated and sedated.  Head: Atraumatic. Normocephalic.  Eyes: Conjunctivae nl. No scleral icterus.  ENT: Mucous membranes are moist. Oropharynx is clear.  Neck: Supple. Full ROM. No  "lymphadenopathy.  Cardiovascular: Regular rate and rhythm. No murmurs, rubs, or gallops. Distal pulses are 2+ and symmetric.  Pulmonary/Chest: No respiratory distress. Clear to auscultation bilaterally. No wheezing, rales, or rhonchi.  Abdominal: Soft. Non-distended. No TTP. No rebound, guarding, or rigidity.   Musculoskeletal:  Trace bilateral pitting pedal edema.  Skin: Warm and dry.  Neurological:  Unable to do thorough neurological examination as patient is sedated.  Bilateral pupillary reflexes sluggish present.    Lines/Drains/Airways       Drain  Duration                  NG/OG Tube 04/15/25 1200 Center mouth <1 day              Airway  Duration                  Airway - Non-Surgical 04/15/25 1200 <1 day              Peripheral Intravenous Line  Duration                  Peripheral IV - Single Lumen 04/15/25 1200 20 G Anterior;Proximal;Right Forearm <1 day         Peripheral IV - Single Lumen 04/15/25 1200 20 G Distal;Posterior;Right Forearm <1 day                    Significant Labs:    Lab Results   Component Value Date    WBC 5.47 05/12/2023    HGB 8.8 (L) 05/12/2023    HCT 28.2 (L) 05/12/2023    MCV 97.9 (H) 05/12/2023     05/12/2023           BMP  Lab Results   Component Value Date     05/12/2023    K 4.7 05/12/2023    CO2 21 (L) 05/12/2023    BUN 28.6 (H) 05/12/2023    CREATININE 1.98 (H) 05/12/2023    CALCIUM 9.1 05/12/2023    AGAP 5.0 05/09/2023         ABG  No results for input(s): "PH", "PO2", "PCO2", "HCO3", "POCBASEDEF" in the last 168 hours.    Mechanical Ventilation Support:  Vent Mode: A/C (04/15/25 1125)  Ventilator Initiated: Yes (04/15/25 1125)  Set Rate: 18 BPM (04/15/25 1125)  Vt Set: 450 mL (04/15/25 1125)  PEEP/CPAP: 5 cmH20 (04/15/25 1125)  Oxygen Concentration (%): 30 (04/15/25 1200)  Peak Airway Pressure: 27 cmH20 (04/15/25 1125)  Total Ve: 8.6 L/m (04/15/25 1125)  F/VT Ratio<105 (RSBI): (!) 47.62 (04/15/25 1125)      Significant Imaging:  I have reviewed the pertinent " imaging within the past 24 hours.        Assessment/Plan:     Assessment  In-hospital (IMC) cardiac arrest secondary to third-degree AV block, s/p transvenous pacemaker.  Acute respiratory failure secondary to third-degree AV block s/p intubation  CARMEL secondary to ATN  Non-insulin-dependent diabetes mellitus type 2  New onset heart failure with reduced ejection fraction (need to confirm, ordered echo)    Plan  Ordered basic labs, admit to ICU.  Ordered chest x-ray, no acute changes.  Ordered echo, BNP.  Consulted cardiology to evaluate need for permanent pacemaker  Continue  mL/hour considering CARMEL, we will re-evaluate after BMP.  We will start on low-dose sliding scale.  Continue propofol, intubated, we will obtain ABG.  We will consider weaning off from sedation.  CT head without IV contrast at [facility], no intracranial abnormality, subcutaneous hematoma in the occipital region.  Upon arrival, patient is febrile, ordered blood cultures, urinalysis/cultures, lactic acid.    DVT Prophylaxis:  SCD  GI Prophylaxis:  None     32 minutes of critical care was time spent personally by me on the following activities: development of treatment plan with patient or surrogate and bedside caregivers, discussions with consultants, evaluation of patient's response to treatment, examination of patient, ordering and performing treatments and interventions, ordering and review of laboratory studies, ordering and review of radiographic studies, pulse oximetry, re-evaluation of patient's condition.  This critical care time did not overlap with that of any other provider or involve time for any procedures.     Marcie Peña MD  Pulmonary Critical Care Medicine  [facility] - 7th Floor ICU  DOS: 04/15/2025           [1]   Social History  Socioeconomic History    Marital status: Single   Tobacco Use    Smoking status: Never    Smokeless tobacco: Never   Substance and Sexual Activity    Alcohol use: Never     Drug use: Never    Sexual activity: Not Currently

## 2025-04-15 NOTE — CONSULTS
Inpatient consult to Cardiology  Consult performed by: Lavon Burt ANP  Consult ordered by: Nj Becerra MD  Reason for consult: Arrest        [facility]    Cardiology  Consult Note    Patient Name: Bacilio Arguelles  MRN: [id number]  Admission Date: 4/15/2025  Hospital Length of Stay: 0 days  Code Status: Full Code   Attending Provider: Nj Becerra MD   Consulting Provider: ANTHONY Law  Primary Care Physician: Malik Zavala MD  Principal Problem:<principal problem not specified>    Patient information was obtained from past medical records and ER records.     Subjective:     Chief Complaint/Reason for Consult: Arrest/Bradycardia     HPI: Mr. Arguelles is a 50 y/o male who is unknown to [facility]. The patient presented to [facility] on 4.15.25 via Transfer from [facility] after sustaining a Cardiac Arrest with ROS[facility]. Neurological Recovery was identified despite being Intubated/Ventilated for Airway Protection. He passed out and was unresponsive for about 2 minutes at the group home. He was found to have a HR in the 20s-30s, given Atropine and ACLS/CPR for 2 Minutes with ROSCH. He was note on any AV Luis A Blocking Agents/Rate Lowering Medications. He was noted to be in a High Grade AVB and underwent a TVP Placement and LHC with Non-Obstructive CAD. He did have an Elevated WBC Count and was Running a Temperature of > 101F. [facility] was consulted for Device Implant.     PMH: Anxiety, Depression, DM II, GERD, HLD, Hypothyroidism, Intellectual Disability, Mood Disorder, Seizure Disorder  PSH: Arthroplasty of Bilateral Hips  Family History: Unable to Assess  Social History: Denies Illicit Drug, ETOH and Tobacco Use     Previous Cardiac Diagnostics:   ECHO 4.14.25:  LV Systolic Function is Significant Inferior Wall Motion Abnormality. Mildly Hypokinetic Anterior-Septal Wall  Estimated EF 30%  RV Function is Decreased  Trace AI  Mild MR  Trace PI  No Pericardial Effusion     [facility]  4.14.25:  Separate Ostial for The LAD and LCX  LAD - Mild CAD  LCX - Mild CAD   RCA - Small, Non-Dominant Vessel with No Significant CAD  Non-Obstructive CAD  Elevated Troponin likely due to Asystolic Event  NICMO    Review of patient's allergies indicates:  No Known Allergies  No current facility-administered medications on file prior to encounter.     Current Outpatient Medications on File Prior to Encounter   Medication Sig    alfuzosin (UROXATRAL) 10 mg Tb24 Take 10 mg by mouth daily with dinner or evening meal.    ARIPiprazole (ABILIFY) 5 MG Tab Take 5 mg by mouth.    aspirin (ECOTRIN) 81 MG EC tablet Aspir-81 mg tablet,delayed release   Take 1 tablet every day by oral route.    atorvastatin (LIPITOR) 20 MG tablet atorvastatin 20 mg tablet    DAYVIGO 5 mg Tab Take 1 tablet by mouth.    divalproex (DEPAKOTE) 125 MG EC tablet Take 125 mg by mouth every evening.    fenofibrate (TRICOR) 145 MG tablet fenofibrate nanocrystallized 145 mg tablet    folic acid (FOLVITE) 1 MG tablet Take 1 tablet (1 mg total) by mouth once daily.    furosemide (LASIX) 40 MG tablet Take 0.5 tablets (20 mg total) by mouth daily as needed (Swelling, shortness of breath, rapid weight gain of 2 lb overnight).    glipiZIDE 5 MG TR24 Take 1 tablet (5 mg total) by mouth daily with breakfast.    levothyroxine (SYNTHROID) 25 MCG tablet levothyroxine 25 mcg tablet    paliperidone palmitate (INVEGA SUSTENNA) 117 mg/0.75 mL Syrg injection Invega Sustenna 117 mg/0.75 mL intramuscular syringe    pantoprazole (PROTONIX) 40 MG tablet Take 40 mg by mouth.    propranolol HCl (PROPRANOLOL ORAL) Take 5 mg by mouth 2 (two) times a day.    sodium bicarbonate 650 MG tablet Take 1 tablet (650 mg total) by mouth 3 (three) times daily. for 7 days    TRINTELLIX 20 mg Tab Take 1 tablet by mouth.    ursodioL (ACTIGALL) 300 mg capsule Take by mouth 3 (three) times daily.     Review of Systems   Unable to perform ROS: Intubated     Objective:     Vital Signs (Most  "Recent):  Temp: (!) 101 °F (38.3 °C) (04/15/25 1200)  Pulse: 61 (04/15/25 1230)  Resp: 14 (04/15/25 1230)  BP: 91/60 (04/15/25 1230)  SpO2: 99 % (04/15/25 1230) Vital Signs (24h Range):  Temp:  [101 °F (38.3 °C)] 101 °F (38.3 °C)  Pulse:  [58-62] 61  Resp:  [13-20] 14  SpO2:  [98 %-100 %] 99 %  BP: (91)/(60) 91/60   Weight: 79.8 kg (175 lb 14.8 oz)  Body mass index is 30.2 kg/m².  SpO2: 99 %     No intake or output data in the 24 hours ending 04/15/25 1423  Lines/Drains/Airways       Drain  Duration                  NG/OG Tube 04/15/25 1200 Center mouth <1 day              Airway  Duration                  Airway - Non-Surgical 04/15/25 1200 <1 day              Peripheral Intravenous Line  Duration                  Peripheral IV - Single Lumen 04/15/25 1200 20 G Anterior;Proximal;Right Forearm <1 day         Peripheral IV - Single Lumen 04/15/25 1200 20 G Distal;Posterior;Right Forearm <1 day                  Significant Labs:   Chemistries:   Recent Labs   Lab 04/15/25  1317      K 5.2*   *   CO2 21*   BUN 48.4*   CREATININE 2.14*   CALCIUM 8.6   BILITOT 0.8   ALKPHOS 77   ALT 18   AST 55*   GLUCOSE 153*   MG 1.60   PHOS 3.1        CBC/Anemia Labs: Coags:    Recent Labs   Lab 04/15/25  1317   WBC 8.71   HGB 12.5*   HCT 39.5*   *   .9*   RDW 15.9    No results for input(s): "PT", "INR", "APTT" in the last 168 hours.     EKG:       Telemetry: SR/Not Pacing with TVP Backup Rate of 50bpm    Physical Exam  Constitutional:       General: He is not in acute distress.     Appearance: Normal appearance. He is obese.      Comments: Vented/Sedated   HENT:      Head: Normocephalic.      Mouth/Throat:      Mouth: Mucous membranes are moist.   Cardiovascular:      Rate and Rhythm: Normal rate and regular rhythm.      Pulses: Normal pulses.      Heart sounds: Normal heart sounds. No murmur heard.  Pulmonary:      Effort: Pulmonary effort is normal. No respiratory distress.      Comments: Ventilator " Associated Breath Sounds  Vent Mode: A/C  Oxygen Concentration (%):  [30] 30  Resp Rate Total:  [18 br/min-20 br/min] 18 br/min  Vt Set:  [450 mL] 450 mL  PEEP/CPAP:  [5 cmH20] 5 cmH20  Mean Airway Pressure:  [6 ymK92-170 cmH20] 311 cmH20  Abdominal:      Palpations: Abdomen is soft.   Skin:     General: Skin is warm and dry.   Neurological:      Comments: Vented/Sedated       Home Medications:   Medications Ordered Prior to Encounter[1]  Current Schedule Inpatient Medications:   mupirocin   Nasal BID     Continuous Infusions:   lactated ringers   Intravenous Continuous        propofoL  0-50 mcg/kg/min Intravenous Continuous 19.2 mL/hr at 04/15/25 1330 40 mcg/kg/min at 04/15/25 1330     Assessment:   Syncope due to High Grade AVB    - s/p TVP (4.14.25)  Cardiopulmonary Arrest    - Given Atropine/ACLS x 2 Minutes with ROSC - Following Simple Commands on Ventilator  Acute Hypoxemic Respiratory Failure requiring Intubation/Ventilation  CARMEL  Febrile   Electrolyte Derangements - Hyperkalemia   NICMO/EF 30%    - LHC (4.14.25) - Separate Ostial for The LAD and LCX; LAD - Mild CAD; LCX - Mild CAD; RCA - Small, Non-Dominant Vessel with No Significant CAD; Non-Obstructive CAD  NSTEMI Type II in the Setting of Cardiopulmonary Arrest  DM II  HTN  HLD  Anxiety/Depression  Intellectual Disability  GERD  Mood Disorder  Bipolar Disorder  Seizure Disorder  No Hx of GIB     Plan:   Continue TVP  Vent per Primary Team  Replete Mg  Keep K > 4.0 and Mg > 2.0   Hold all AV Luis A Blocking Agents and Rate Lowering Medications  Rule Out Infection Prior to Device Implant  Labs and EKG in AM: CBC, CMP and Mg     Thank you for your consult.     Lavon Burt, ANTHONY  Cardiology  [facility]        [1]   No current facility-administered medications on file prior to encounter.     Current Outpatient Medications on File Prior to Encounter   Medication Sig Dispense Refill    alfuzosin (UROXATRAL) 10 mg Tb24 Take 10 mg  by mouth daily with dinner or evening meal.      ARIPiprazole (ABILIFY) 5 MG Tab Take 5 mg by mouth.      aspirin (ECOTRIN) 81 MG EC tablet Aspir-81 mg tablet,delayed release   Take 1 tablet every day by oral route.      atorvastatin (LIPITOR) 20 MG tablet atorvastatin 20 mg tablet      DAYVIGO 5 mg Tab Take 1 tablet by mouth.      divalproex (DEPAKOTE) 125 MG EC tablet Take 125 mg by mouth every evening.      fenofibrate (TRICOR) 145 MG tablet fenofibrate nanocrystallized 145 mg tablet      folic acid (FOLVITE) 1 MG tablet Take 1 tablet (1 mg total) by mouth once daily. 30 tablet 0    furosemide (LASIX) 40 MG tablet Take 0.5 tablets (20 mg total) by mouth daily as needed (Swelling, shortness of breath, rapid weight gain of 2 lb overnight).      glipiZIDE 5 MG TR24 Take 1 tablet (5 mg total) by mouth daily with breakfast. 30 tablet 0    levothyroxine (SYNTHROID) 25 MCG tablet levothyroxine 25 mcg tablet      paliperidone palmitate (INVEGA SUSTENNA) 117 mg/0.75 mL Syrg injection Invega Sustenna 117 mg/0.75 mL intramuscular syringe      pantoprazole (PROTONIX) 40 MG tablet Take 40 mg by mouth.      propranolol HCl (PROPRANOLOL ORAL) Take 5 mg by mouth 2 (two) times a day.      sodium bicarbonate 650 MG tablet Take 1 tablet (650 mg total) by mouth 3 (three) times daily. for 7 days 21 tablet 0    TRINTELLIX 20 mg Tab Take 1 tablet by mouth.      ursodioL (ACTIGALL) 300 mg capsule Take by mouth 3 (three) times daily.

## 2025-04-16 LAB
ALBUMIN SERPL-MCNC: 2.2 G/DL (ref 3.5–5)
ALBUMIN/GLOB SERPL: 0.6 RATIO (ref 1.1–2)
ALP SERPL-CCNC: 79 UNIT/L (ref 40–150)
ALT SERPL-CCNC: 20 UNIT/L (ref 0–55)
ANION GAP SERPL CALC-SCNC: 14 MEQ/L
AST SERPL-CCNC: 46 UNIT/L (ref 11–45)
BASOPHILS # BLD AUTO: 0.03 X10(3)/MCL
BASOPHILS NFR BLD AUTO: 0.3 %
BILIRUB SERPL-MCNC: 1 MG/DL
BUN SERPL-MCNC: 49.6 MG/DL (ref 8.9–20.6)
CALCIUM SERPL-MCNC: 8.3 MG/DL (ref 8.4–10.2)
CHLORIDE SERPL-SCNC: 110 MMOL/L (ref 98–107)
CO2 SERPL-SCNC: 18 MMOL/L (ref 22–29)
CREAT SERPL-MCNC: 2.05 MG/DL (ref 0.72–1.25)
CREAT/UREA NIT SERPL: 24
EOSINOPHIL # BLD AUTO: 0.01 X10(3)/MCL (ref 0–0.9)
EOSINOPHIL NFR BLD AUTO: 0.1 %
ERYTHROCYTE [DISTWIDTH] IN BLOOD BY AUTOMATED COUNT: 15.5 % (ref 11.5–17)
GFR SERPLBLD CREATININE-BSD FMLA CKD-EPI: 39 ML/MIN/1.73/M2
GLOBULIN SER-MCNC: 3.7 GM/DL (ref 2.4–3.5)
GLUCOSE SERPL-MCNC: 157 MG/DL (ref 74–100)
HCT VFR BLD AUTO: 41.5 % (ref 42–52)
HGB BLD-MCNC: 13.1 G/DL (ref 14–18)
IMM GRANULOCYTES # BLD AUTO: 0.05 X10(3)/MCL (ref 0–0.04)
IMM GRANULOCYTES NFR BLD AUTO: 0.5 %
LYMPHOCYTES # BLD AUTO: 1.31 X10(3)/MCL (ref 0.6–4.6)
LYMPHOCYTES NFR BLD AUTO: 11.9 %
MAGNESIUM SERPL-MCNC: 3 MG/DL (ref 1.6–2.6)
MCH RBC QN AUTO: 33.3 PG (ref 27–31)
MCHC RBC AUTO-ENTMCNC: 31.6 G/DL (ref 33–36)
MCV RBC AUTO: 105.6 FL (ref 80–94)
MONOCYTES # BLD AUTO: 0.71 X10(3)/MCL (ref 0.1–1.3)
MONOCYTES NFR BLD AUTO: 6.5 %
NEUTROPHILS # BLD AUTO: 8.86 X10(3)/MCL (ref 2.1–9.2)
NEUTROPHILS NFR BLD AUTO: 80.7 %
NRBC BLD AUTO-RTO: 0 %
OHS QRS DURATION: 168 MS
OHS QTC CALCULATION: 441 MS
PHOSPHATE SERPL-MCNC: 3.4 MG/DL (ref 2.3–4.7)
PLATELET # BLD AUTO: 109 X10(3)/MCL (ref 130–400)
PMV BLD AUTO: 9.6 FL (ref 7.4–10.4)
POCT GLUCOSE: 148 MG/DL (ref 70–110)
POCT GLUCOSE: 160 MG/DL (ref 70–110)
POTASSIUM SERPL-SCNC: 4.3 MMOL/L (ref 3.5–5.1)
PROT SERPL-MCNC: 5.9 GM/DL (ref 6.4–8.3)
RBC # BLD AUTO: 3.93 X10(6)/MCL (ref 4.7–6.1)
SODIUM SERPL-SCNC: 142 MMOL/L (ref 136–145)
WBC # BLD AUTO: 10.97 X10(3)/MCL (ref 4.5–11.5)

## 2025-04-16 PROCEDURE — 27200966 HC CLOSED SUCTION SYSTEM

## 2025-04-16 PROCEDURE — 99900031 HC PATIENT EDUCATION (STAT)

## 2025-04-16 PROCEDURE — 27100171 HC OXYGEN HIGH FLOW UP TO 24 HOURS

## 2025-04-16 PROCEDURE — 94761 N-INVAS EAR/PLS OXIMETRY MLT: CPT

## 2025-04-16 PROCEDURE — 85025 COMPLETE CBC W/AUTO DIFF WBC: CPT | Performed by: NURSE PRACTITIONER

## 2025-04-16 PROCEDURE — 93005 ELECTROCARDIOGRAM TRACING: CPT

## 2025-04-16 PROCEDURE — 36415 COLL VENOUS BLD VENIPUNCTURE: CPT | Performed by: NURSE PRACTITIONER

## 2025-04-16 PROCEDURE — 80053 COMPREHEN METABOLIC PANEL: CPT | Performed by: INTERNAL MEDICINE

## 2025-04-16 PROCEDURE — 25000003 PHARM REV CODE 250: Performed by: INTERNAL MEDICINE

## 2025-04-16 PROCEDURE — 25000003 PHARM REV CODE 250: Performed by: STUDENT IN AN ORGANIZED HEALTH CARE EDUCATION/TRAINING PROGRAM

## 2025-04-16 PROCEDURE — 94003 VENT MGMT INPAT SUBQ DAY: CPT

## 2025-04-16 PROCEDURE — 94760 N-INVAS EAR/PLS OXIMETRY 1: CPT

## 2025-04-16 PROCEDURE — 93010 ELECTROCARDIOGRAM REPORT: CPT | Mod: ,,, | Performed by: INTERNAL MEDICINE

## 2025-04-16 PROCEDURE — 99900035 HC TECH TIME PER 15 MIN (STAT)

## 2025-04-16 PROCEDURE — 20000000 HC ICU ROOM

## 2025-04-16 PROCEDURE — 83735 ASSAY OF MAGNESIUM: CPT | Performed by: INTERNAL MEDICINE

## 2025-04-16 PROCEDURE — 63600175 PHARM REV CODE 636 W HCPCS: Performed by: INTERNAL MEDICINE

## 2025-04-16 PROCEDURE — 99900026 HC AIRWAY MAINTENANCE (STAT)

## 2025-04-16 PROCEDURE — 84100 ASSAY OF PHOSPHORUS: CPT | Performed by: INTERNAL MEDICINE

## 2025-04-16 RX ORDER — DEXMEDETOMIDINE HYDROCHLORIDE 4 UG/ML
0-1.4 INJECTION, SOLUTION INTRAVENOUS CONTINUOUS
Status: DISCONTINUED | OUTPATIENT
Start: 2025-04-16 | End: 2025-04-20

## 2025-04-16 RX ADMIN — ARIPIPRAZOLE 5 MG: 5 TABLET ORAL at 08:04

## 2025-04-16 RX ADMIN — SODIUM CHLORIDE, POTASSIUM CHLORIDE, SODIUM LACTATE AND CALCIUM CHLORIDE: 600; 310; 30; 20 INJECTION, SOLUTION INTRAVENOUS at 05:04

## 2025-04-16 RX ADMIN — SODIUM CHLORIDE, POTASSIUM CHLORIDE, SODIUM LACTATE AND CALCIUM CHLORIDE: 600; 310; 30; 20 INJECTION, SOLUTION INTRAVENOUS at 07:04

## 2025-04-16 RX ADMIN — DEXMEDETOMIDINE HYDROCHLORIDE 0.2 MCG/KG/HR: 400 INJECTION INTRAVENOUS at 09:04

## 2025-04-16 RX ADMIN — PROPOFOL 50 MCG/KG/MIN: 10 INJECTION, EMULSION INTRAVENOUS at 05:04

## 2025-04-16 RX ADMIN — PROPOFOL 50 MCG/KG/MIN: 10 INJECTION, EMULSION INTRAVENOUS at 07:04

## 2025-04-16 RX ADMIN — PROPOFOL 50 MCG/KG/MIN: 10 INJECTION, EMULSION INTRAVENOUS at 12:04

## 2025-04-16 RX ADMIN — PROPOFOL 50 MCG/KG/MIN: 10 INJECTION, EMULSION INTRAVENOUS at 04:04

## 2025-04-16 RX ADMIN — MUPIROCIN: 20 OINTMENT TOPICAL at 09:04

## 2025-04-16 RX ADMIN — ASPIRIN 81 MG: 81 TABLET, COATED ORAL at 08:04

## 2025-04-16 RX ADMIN — MUPIROCIN: 20 OINTMENT TOPICAL at 08:04

## 2025-04-16 RX ADMIN — PROPOFOL 50 MCG/KG/MIN: 10 INJECTION, EMULSION INTRAVENOUS at 02:04

## 2025-04-16 RX ADMIN — PROPOFOL 50 MCG/KG/MIN: 10 INJECTION, EMULSION INTRAVENOUS at 09:04

## 2025-04-16 NOTE — PLAN OF CARE
04/16/25 1350   Discharge Assessment   Assessment Type Discharge Planning Assessment   Confirmed/corrected address, phone number and insurance Yes   Confirmed Demographics Correct on Facesheet   Source of Information health care advocate   When was your last doctors appointment? 02/10/25   Communicated ALISHA with patient/caregiver Date not available/Unable to determine   Reason For Admission heart block, complete heart block   People in Home facility resident   Facility Arrived From: [facility] [place]   Do you expect to return to your current living situation? Yes   Do you have help at home or someone to help you manage your care at home? Yes   Who are your caregiver(s) and their phone number(s)? group home staff   Prior to hospitilization cognitive status: Unable to Assess   Current cognitive status: Unable to Assess   Walking or Climbing Stairs Difficulty no   Dressing/Bathing Difficulty no   Home Accessibility wheelchair accessible   Equipment Currently Used at Home none   Patient currently being followed by outpatient case management? No   Do you currently have service(s) that help you manage your care at home? No   Do you take prescription medications? Yes   Do you have prescription coverage? Yes   Coverage medicaid   Do you have any problems affording any of your prescribed medications? No   Is the patient taking medications as prescribed? yes   How do you get to doctors appointments? other (see comments)  (provided by home)   Are you on dialysis? No   Do you take coumadin? No   Discharge Plan A Group Home   Discharge Plan B Other  (TBD)   DME Needed Upon Discharge  other (see comments)  (TBD)   Discharge Plan discussed with: Caregiver   Name(s) and Number(s) ALEKSANDR Grace at Group HOme   Transition of Care Barriers None   OTHER   Name(s) of People in Home resident at group home     [facility] [place]  Spoke to Lesly BRANDON [phone].  Patient has sister. Placed her information  into EPIC  Is mostly independent at home. Can dress, bath, feed himself.

## 2025-04-16 NOTE — PROGRESS NOTES
Inpatient Nutrition Assessment    Admit Date: 4/15/2025   Total duration of encounter: 1 day   Patient Age: 49 y.o.    Nutrition Recommendation/Prescription     Tube feeding when feasible, can start at goal rate:  Peptamen Intense VHP goal rate 30 ml/hr   [id number] TID  to provide  840 kcal  96% needs, 132% needs with propofol calories considered (18 kcal/kg)  116 g protein  98% needs  45 g carbohydrate 51% needs  504 ml free water 32% needs  calculations based on estimated 20 hour run time     Communication of Recommendations: reviewed with nurse    Nutrition Assessment     Malnutrition Assessment/Nutrition-Focused Physical Exam    Malnutrition Context: acute illness or injury (04/16/25 1422)  Malnutrition Level: other (see comments) (Does not meet criteria) (04/16/25 1422)                                                        A minimum of two characteristics is recommended for diagnosis of either severe or non-severe malnutrition.    Chart Review    Reason Seen: continuous nutrition monitoring and malnutrition screening tool (MST)    Malnutrition Screening Tool Results   Have you recently lost weight without trying?: Unsure  Have you been eating poorly because of a decreased appetite?: No   MST Score: 2   Diagnosis:  In-hospital ([facility]) cardiac arrest secondary to third-degree AV block, s/p transvenous pacemaker.  Acute respiratory failure secondary to third-degree AV block s/p intubation  CARMEL secondary to ATN  Non-insulin-dependent diabetes mellitus type 2    Relevant Medical History: non-insulin-dependent type 2 diabetes mellitus, hypothyroidism, seizure disorder, unspecified psychiatric disorder, intellectual disability     Scheduled Medications:  ARIPiprazole, 5 mg, Daily  aspirin, 81 mg, Daily  mupirocin, , BID    Continuous Infusions:  lactated ringers  propofoL, Last Rate: 50 mcg/kg/min (04/16/25 1401)    PRN Medications:   dextrose 50%, 12.5 g, PRN  glucagon (human recombinant), 1 mg, PRN  insulin  "aspart U-100, 0-5 Units, Q6H PRN  sodium chloride 0.9%, 10 mL, PRN    Calorie Containing IV Medications: Diprivan @ 23.9 ml/hr (provides 631 kcal/d)    Recent Labs   Lab 04/15/25  1317 04/15/25  1637 04/16/25  0317     --  142   K 5.2*  --  4.3   CALCIUM 8.6  --  8.3*   PHOS 3.1  --  3.4   MG 1.60  --  3.00*   *  --  110*   CO2 21*  --  18*   BUN 48.4*  --  49.6*   CREATININE 2.14*  --  2.05*   EGFRNORACEVR 37  --  39   GLUCOSE 153*  --  157*   BILITOT 0.8  --  1.0   ALKPHOS 77  --  79   ALT 18  --  20   AST 55*  --  46*   ALBUMIN 2.3*  --  2.2*   AMMONIA  --  40.9  --    WBC 8.71  --  10.97   HGB 12.5*  --  13.1*   HCT 39.5*  --  41.5*     Nutrition Orders:  Diet NPO      Appetite/Oral Intake: NPO/not applicable  Factors Affecting Nutritional Intake: NPO and on mechanical ventilation  Social Needs Impacting Access to Food: unable to assess at this time; will attempt on follow-up  Food/Quaker/Cultural Preferences: unable to obtain  Food Allergies: none reported  Last Bowel Movement: 04/14/25  Wound(s): no pressure injuries documented at this time     Comments    4/16/25 Patient on mechanical ventilation, receiving calories from propofol, recommend tube feeding when feasible, nurse reports possible extubation today; recommend Peptamen Intense VHP with protein modular to best meet needs at this time.    Anthropometrics    Height: 5' 4" (162.6 cm), Height Method: Estimated  Last Weight: 79.8 kg (175 lb 14.8 oz) (04/15/25 1307), Weight Method: Bed Scale  BMI (Calculated): 30.2  BMI Classification: obese grade I (BMI 30-34.9)        Ideal Body Weight (IBW), Male: 130 lb     % Ideal Body Weight, Male (lb): 135.33 %                          Usual Weight Provided By: unable to obtain usual weight    Wt Readings from Last 5 Encounters:   04/15/25 79.8 kg (175 lb 14.8 oz)   06/06/23 80 kg (176 lb 4.8 oz)   05/08/23 82.6 kg (182 lb 1.6 oz)   09/06/21 83.9 kg (184 lb 15.5 oz)     Weight Change(s) Since Admission: "   4/16/25 verified weight of 79.8 kg on bed scale during rounds  Wt Readings from Last 1 Encounters:   04/15/25 1307 79.8 kg (175 lb 14.8 oz)   Admit Weight: 79.8 kg (175 lb 14.8 oz) (04/15/25 1307), Weight Method: Bed Scale    Estimated Needs    Weight Used For Calorie Calculations: 79.8 kg (175 lb 14.8 oz)  Energy Calorie Requirements (kcal): 877.8-1,117.2, 11-14 kcal/kg     Weight Used For Protein Calculations: 59 kg (130 lb)  Protein Requirements: 118 g, 2 g/kg  Fluid Requirements (mL): 1596, 20 ml/kg  CHO Requirement:  g, 40-50% of kcal     Enteral Nutrition Patient not receiving enteral nutrition at this time.    Parenteral Nutrition Patient not receiving parenteral nutrition support at this time.    Evaluation of Received Nutrient Intake    Calories: not meeting estimated needs  Protein: not meeting estimated needs    Patient Education Not applicable.    Nutrition Diagnosis     PES: Inadequate energy intake related to inability to consume sufficient nutrients as evidenced by less than 80% needs met. (active)    PES: N/A           Nutrition Interventions     Intervention(s): modified composition of enteral nutrition, modified rate of enteral nutrition, and collaboration with other providers  Intervention(s): N/A       Goal: Meet greater than 80% of nutritional needs by follow-up. (new)  Goal: Tolerate enteral feeding at goal rate by follow-up. (new)    Nutrition Goals & Monitoring     Dietitian will monitor: energy intake, enteral nutrition intake, parenteral nutrition intake, weight, electrolyte/renal panel, beliefs/attitudes, glucose/endocrine profile, and gastrointestinal profile  Discharge planning: too early to determine; pending clinical course  Nutrition Risk/Follow-Up: high (follow-up in 1-4 days)   Please consult if re-assessment needed sooner.

## 2025-04-16 NOTE — PROGRESS NOTES
[facility]    Cardiology  Progress Note    Patient Name: Bacilio Arguelles  MRN: [id number]  Admission Date: 4/15/2025  Hospital Length of Stay: 1 days  Code Status: Full Code   Attending Provider: Nj Becerra MD   Consulting Provider: ANTHONY Law  Primary Care Physician: Malik Zavala MD  Principal Problem:<principal problem not specified>    Patient information was obtained from past medical records and ER records.     Subjective:     Chief Complaint/Reason for Consult: Arrest/Bradycardia     HPI: Mr. Arguelles is a 50 y/o male who is unknown to [facility]. The patient presented to [facility] on 4.15.25 [place] [facility] [place] sustaining a Cardiac Arrest with ROSC. Neurological Recovery was identified despite being Intubated/Ventilated for Airway Protection. He passed out and was unresponsive for about 2 minutes at the group home. He was found to have a HR in the 20s-30s, given Atropine and ACLS/CPR for 2 Minutes with ROSCH. He was note on any AV Luis A Blocking Agents/Rate Lowering Medications. He was noted to be in a High Grade AVB and underwent a TVP Placement and LHC with Non-Obstructive CAD. He did have an Elevated WBC Count and was Running a Temperature of > 101F. [facility] was consulted for Device Implant.     4.16.25: NAD. A.Febrile. WBC Normal. BC Pending. Vented/Sedated. Remains in Intermittent CHB/Back Up TVP. , BUN/Crea 49.6/2.05    PMH: Anxiety, Depression, DM II, GERD, HLD, Hypothyroidism, Intellectual Disability, Mood Disorder, Seizure Disorder  PSH: Arthroplasty of Bilateral Hips  Family History: Unable to Assess  Social History: Denies Illicit Drug, ETOH and Tobacco Use     Previous Cardiac Diagnostics:   ECHO 4.15.25:  Left Ventricle: The left ventricle is normal in size. Mildly increased wall thickness. There is normal systolic function with a visually estimated ejection fraction of 60 - 65%.  Right Ventricle: The right ventricle is normal in size.  Systolic function is normal.  Mitral Valve: Mildly calcified anterior leaflet. There is mild regurgitation.  IVC/SVC: Patient is ventilated, cannot use IVC diameter to estimate right atrial pressure.    ECHO 4.14.25:  LV Systolic Function is Significant Inferior Wall Motion Abnormality. Mildly Hypokinetic Anterior-Septal Wall  Estimated EF 30%  RV Function is Decreased  Trace AI  Mild MR  Trace PI  No Pericardial Effusion     LHC 4.14.25:  Separate Ostial for The LAD and LCX  LAD - Mild CAD  LCX - Mild CAD   RCA - Small, Non-Dominant Vessel with No Significant CAD  Non-Obstructive CAD  Elevated Troponin likely due to Asystolic Event  NICMO    Review of patient's allergies indicates:  No Known Allergies  No current facility-administered medications on file prior to encounter.     Current Outpatient Medications on File Prior to Encounter   Medication Sig    alfuzosin (UROXATRAL) 10 mg Tb24 Take 10 mg by mouth daily with dinner or evening meal.    ARIPiprazole (ABILIFY) 5 MG Tab Take 5 mg by mouth.    aspirin (ECOTRIN) 81 MG EC tablet Aspir-81 mg tablet,delayed release   Take 1 tablet every day by oral route.    atorvastatin (LIPITOR) 20 MG tablet atorvastatin 20 mg tablet    DAYVIGO 5 mg Tab Take 1 tablet by mouth.    divalproex (DEPAKOTE) 125 MG EC tablet Take 125 mg by mouth every evening.    fenofibrate (TRICOR) 145 MG tablet fenofibrate nanocrystallized 145 mg tablet    folic acid (FOLVITE) 1 MG tablet Take 1 tablet (1 mg total) by mouth once daily.    furosemide (LASIX) 40 MG tablet Take 0.5 tablets (20 mg total) by mouth daily as needed (Swelling, shortness of breath, rapid weight gain of 2 lb overnight).    glipiZIDE 5 MG TR24 Take 1 tablet (5 mg total) by mouth daily with breakfast.    levothyroxine (SYNTHROID) 25 MCG tablet levothyroxine 25 mcg tablet    paliperidone palmitate (INVEGA SUSTENNA) 117 mg/0.75 mL Syrg injection Invega Sustenna 117 mg/0.75 mL intramuscular syringe    pantoprazole (PROTONIX) 40 MG  tablet Take 40 mg by mouth.    propranolol HCl (PROPRANOLOL ORAL) Take 5 mg by mouth 2 (two) times a day.    sodium bicarbonate 650 MG tablet Take 1 tablet (650 mg total) by mouth 3 (three) times daily. for 7 days    TRINTELLIX 20 mg Tab Take 1 tablet by mouth.    ursodioL (ACTIGALL) 300 mg capsule Take by mouth 3 (three) times daily.     Review of Systems   Unable to perform ROS: Intubated     Objective:     Vital Signs (Most Recent):  Temp: 98.5 °F (36.9 °C) (04/16/25 0800)  Pulse: 78 (04/16/25 0800)  Resp: 20 (04/16/25 0800)  BP: 138/72 (04/16/25 0800)  SpO2: 99 % (04/16/25 0800) Vital Signs (24h Range):  Temp:  [98.4 °F (36.9 °C)-101 °F (38.3 °C)] 98.5 °F (36.9 °C)  Pulse:  [49-78] 78  Resp:  [2-21] 20  SpO2:  [95 %-100 %] 99 %  BP: ()/(54-72) 138/72   Weight: 79.8 kg (175 lb 14.8 oz)  Body mass index is 30.2 kg/m².  SpO2: 99 %       Intake/Output Summary (Last 24 hours) at 4/16/2025 1103  Last data filed at 4/16/2025 0800  Gross per 24 hour   Intake 312.91 ml   Output 1510 ml   Net -1197.09 ml     Lines/Drains/Airways       Drain  Duration                  NG/OG Tube 04/15/25 1200 Center mouth <1 day         Urethral Catheter 04/15/25 2000 <1 day              Airway  Duration                  Airway - Non-Surgical 04/15/25 1200 <1 day              Peripheral Intravenous Line  Duration                  Peripheral IV - Single Lumen 04/15/25 1200 20 G Anterior;Proximal;Right Forearm <1 day         Peripheral IV - Single Lumen 04/15/25 1200 20 G Distal;Posterior;Right Forearm <1 day                  Significant Labs:   Chemistries:   Recent Labs   Lab 04/15/25  1317 04/16/25  0317    142   K 5.2* 4.3   * 110*   CO2 21* 18*   BUN 48.4* 49.6*   CREATININE 2.14* 2.05*   CALCIUM 8.6 8.3*   BILITOT 0.8 1.0   ALKPHOS 77 79   ALT 18 20   AST 55* 46*   GLUCOSE 153* 157*   MG 1.60 3.00*   PHOS 3.1 3.4        CBC/Anemia Labs: Coags:    Recent Labs   Lab 04/15/25  1317 04/16/25  0317   WBC 8.71 10.97   HGB  "12.5* 13.1*   HCT 39.5* 41.5*   * 109*   .9* 105.6*   RDW 15.9 15.5    No results for input(s): "PT", "INR", "APTT" in the last 168 hours.     EKG:       Telemetry: CHB/Not Paced    Physical Exam  Constitutional:       General: He is not in acute distress.     Appearance: Normal appearance.      Comments: Vented/Sedated   HENT:      Head: Normocephalic.      Mouth/Throat:      Mouth: Mucous membranes are moist.   Cardiovascular:      Rate and Rhythm: Normal rate and regular rhythm.      Pulses: Normal pulses.      Heart sounds: Normal heart sounds. No murmur heard.  Pulmonary:      Effort: Pulmonary effort is normal. No respiratory distress.      Comments: Ventilator Associated Breath Sounds  Vent Mode: A/C  Oxygen Concentration (%):  [30] 30  Resp Rate Total:  [12 br/min-20 br/min] 18 br/min  Vt Set:  [450 mL] 450 mL  PEEP/CPAP:  [2 cmH20-5 cmH20] 5 cmH20  Pressure Support:  [10 cmH20] 10 cmH20  Mean Airway Pressure:  [6 bbH43-321 cmH20] 12 cmH20  Abdominal:      Palpations: Abdomen is soft.   Skin:     General: Skin is warm.      Comments: R IJ TVP/Dressing C/D/I   Neurological:      Comments: Vented/Sedated       Home Medications:   Medications Ordered Prior to Encounter[1]  Current Schedule Inpatient Medications:   ARIPiprazole  5 mg Oral Daily    aspirin  81 mg Oral Daily    mupirocin   Nasal BID     Continuous Infusions:   lactated ringers   Intravenous Continuous        propofoL  0-50 mcg/kg/min Intravenous Continuous 23.9 mL/hr at 04/16/25 0900 50 mcg/kg/min at 04/16/25 0900     Assessment:   Syncope due to High Grade AVB    - s/p TVP (4.14.25)  Cardiopulmonary Arrest    - Given Atropine/ACLS x 2 Minutes with ROSC - Following Simple Commands on Ventilator  Acute Hypoxemic Respiratory Failure requiring Intubation/Ventilation  CARMEL  Febrile - Resolved    - BC x 2 Pending   Electrolyte Derangements - Hyperkalemia - Resolved   NICMO/EF 30%    - LHC (4.14.25) - Separate Ostial for The LAD and LCX; " LAD - Mild CAD; LCX - Mild CAD; RCA - Small, Non-Dominant Vessel with No Significant CAD; Non-Obstructive CAD  NSTEMI Type II in the Setting of Cardiopulmonary Arrest  DM II  HTN  HLD  Anxiety/Depression  Intellectual Disability  GERD  Mood Disorder  Bipolar Disorder  Seizure Disorder  No Hx of GIB     Plan:   Continue TVP  Vent per Primary Team  Keep K > 4.0 and Mg > 2.0   Hold all AV Luis A Blocking Agents and Rate Lowering Medications  Rule Out Infection Prior to Device Implant - BC x 2 Pending, Normal WBCs, Afebrile    - If BP Result Negative will Consider Leadless PPM Implant  Labs and EKG in AM: CBC, CMP and Mg     Lavon Burt, ANP  Cardiology  [facility]        [1]   No current facility-administered medications on file prior to encounter.     Current Outpatient Medications on File Prior to Encounter   Medication Sig Dispense Refill    alfuzosin (UROXATRAL) 10 mg Tb24 Take 10 mg by mouth daily with dinner or evening meal.      ARIPiprazole (ABILIFY) 5 MG Tab Take 5 mg by mouth.      aspirin (ECOTRIN) 81 MG EC tablet Aspir-81 mg tablet,delayed release   Take 1 tablet every day by oral route.      atorvastatin (LIPITOR) 20 MG tablet atorvastatin 20 mg tablet      DAYVIGO 5 mg Tab Take 1 tablet by mouth.      divalproex (DEPAKOTE) 125 MG EC tablet Take 125 mg by mouth every evening.      fenofibrate (TRICOR) 145 MG tablet fenofibrate nanocrystallized 145 mg tablet      folic acid (FOLVITE) 1 MG tablet Take 1 tablet (1 mg total) by mouth once daily. 30 tablet 0    furosemide (LASIX) 40 MG tablet Take 0.5 tablets (20 mg total) by mouth daily as needed (Swelling, shortness of breath, rapid weight gain of 2 lb overnight).      glipiZIDE 5 MG TR24 Take 1 tablet (5 mg total) by mouth daily with breakfast. 30 tablet 0    levothyroxine (SYNTHROID) 25 MCG tablet levothyroxine 25 mcg tablet      paliperidone palmitate (INVEGA SUSTENNA) 117 mg/0.75 mL Syrg injection Invega Sustenna 117  mg/0.75 mL intramuscular syringe      pantoprazole (PROTONIX) 40 MG tablet Take 40 mg by mouth.      propranolol HCl (PROPRANOLOL ORAL) Take 5 mg by mouth 2 (two) times a day.      sodium bicarbonate 650 MG tablet Take 1 tablet (650 mg total) by mouth 3 (three) times daily. for 7 days 21 tablet 0    TRINTELLIX 20 mg Tab Take 1 tablet by mouth.      ursodioL (ACTIGALL) 300 mg capsule Take by mouth 3 (three) times daily.

## 2025-04-16 NOTE — PROGRESS NOTES
[facility] - 7th Floor ICU  Pulmonary Critical Care Note    Patient Name: Bacilio Arguelles  MRN: [id number]  Admission Date: 4/15/2025  Hospital Length of Stay: 1 days  Code Status: Full Code  Attending Provider: Nj Becerra MD  Primary Care Provider: Malik Zavala MD     Subjective:     HPI:   Fortunately old male with past medical history of non-insulin-dependent type 2 diabetes mellitus, hypothyroidism, seizure disorder, unspecified psychiatric disorder, intellectual disability transferred from [facility] for permanent pacemaker placement.  Patient passed out and was unresponsive for 2 minutes at group home, with heart rate ranging between 20s- 30s, given atropine, in the ER he was found to be in complete heart block, later went into asystole, ROSC achieved 2 minutes post CPR.  Immediately taken to cath lab and had a transvenous pacemaker placed and then admitted to ICU.  Per chart review, not on any AV germania blocking agents, no reversible causes identified, transferred to evaluate/need for permanent pacemaker placement.  No history is obtainable at this time as patient is intubated and sedated.      Past 24 hours:  Patient remains intubated sedated mechanically ventilated.  Transvenous pacemaker is in place.  Not currently pacing but rhythm is irregular it appears to be high-grade AV block with rate in the low 60s.  Last fever was 100.4° F at midnight.  No culture growth or PCR positivity.      Past Medical History:   Diagnosis Date    Anxiety disorder, unspecified     Depression     DM (diabetes mellitus), type 2     GERD (gastroesophageal reflux disease)     HLD (hyperlipidemia)     Hypothyroidism, unspecified     Intellectual disability     Mood disorder     Seizure disorder        Past Surgical History:   Procedure Laterality Date    ARTHROPLASTY,HIP,TOTAL, BILATERAL, POSTERIOR APPROACH Bilateral        Social History[1]        Current Outpatient Medications    Medication Instructions    alfuzosin (UROXATRAL) 10 mg, Oral, With dinner    ARIPiprazole (ABILIFY) 5 mg, Oral    aspirin (ECOTRIN) 81 MG EC tablet Aspir-81 mg tablet,delayed release   Take 1 tablet every day by oral route.    atorvastatin (LIPITOR) 20 MG tablet atorvastatin 20 mg tablet    DAYVIGO 5 mg Tab 1 tablet, Oral    divalproex (DEPAKOTE) 125 mg, Oral, Nightly    fenofibrate (TRICOR) 145 MG tablet fenofibrate nanocrystallized 145 mg tablet    folic acid (FOLVITE) 1 mg, Oral, Daily    furosemide (LASIX) 20 mg, Oral, Daily PRN    glipiZIDE 5 mg, Oral, With breakfast    levothyroxine (SYNTHROID) 25 MCG tablet levothyroxine 25 mcg tablet    paliperidone palmitate (INVEGA SUSTENNA) 117 mg/0.75 mL Syrg injection Invega Sustenna 117 mg/0.75 mL intramuscular syringe    pantoprazole (PROTONIX) 40 mg, Oral    propranolol HCl (PROPRANOLOL ORAL) 5 mg, Oral, 2 times daily    sodium bicarbonate 650 mg, Oral, 3 times daily    TRINTELLIX 20 mg Tab 1 tablet, Oral    ursodioL (ACTIGALL) 300 mg capsule Oral, 3 times daily       Review of patient's allergies indicates:  No Known Allergies     Current Inpatient Medications   ARIPiprazole  5 mg Oral Daily    aspirin  81 mg Oral Daily    mupirocin   Nasal BID       Current Intravenous Infusions   lactated ringers   Intravenous Continuous        propofoL  0-50 mcg/kg/min Intravenous Continuous 23.9 mL/hr at 04/16/25 0634 50 mcg/kg/min at 04/16/25 0634            Unable to do review of systems as patient is intubated and sedated.    Objective:       Intake/Output Summary (Last 24 hours) at 4/16/2025 0758  Last data filed at 4/16/2025 0634  Gross per 24 hour   Intake 312.91 ml   Output 1335 ml   Net -1022.09 ml         Vital Signs (Most Recent):  Temp: 98.4 °F (36.9 °C) (04/16/25 0400)  Pulse: (!) 55 (04/16/25 0600)  Resp: 18 (04/16/25 0600)  BP: 136/66 (04/16/25 0600)  SpO2: 100 % (04/16/25 0600)  Body mass index is 30.2 kg/m².  Weight: 79.8 kg (175 lb 14.8 oz) Vital Signs (24h  Range):  Temp:  [98.4 °F (36.9 °C)-101 °F (38.3 °C)] 98.4 °F (36.9 °C)  Pulse:  [49-62] 55  Resp:  [2-21] 18  SpO2:  [95 %-100 %] 100 %  BP: ()/(54-72) 136/66     Physical Exam  Vital signs and nursing notes reviewed.  Constitutional: NAD.  Intubated and sedated.  Head: Atraumatic. Normocephalic.  Eyes: Conjunctivae nl. No scleral icterus.  ENT: Mucous membranes are moist. Oropharynx is clear.  Neck: Supple. No lymphadenopathy.  Transvenous pacemaker in the right internal jugular  Cardiovascular: Regular rate and rhythm. No murmurs, rubs, or gallops. Distal pulses are 2+ and symmetric.  Pulmonary/Chest: No respiratory distress. Clear to auscultation bilaterally. No wheezing, rales, or rhonchi.  Abdominal: Soft. Non-distended. No TTP. No rebound, guarding, or rigidity.   Musculoskeletal:  Trace bilateral pedal edema.  Skin: Warm and dry.  Neurological:  Unable to do thorough neurological examination as patient is sedated.  Bilateral pupillary reflexes sluggish present.    Lines/Drains/Airways       Drain  Duration                  NG/OG Tube 04/15/25 1200 Center mouth <1 day         Urethral Catheter 04/15/25 2000 <1 day              Airway  Duration                  Airway - Non-Surgical 04/15/25 1200 <1 day              Peripheral Intravenous Line  Duration                  Peripheral IV - Single Lumen 04/15/25 1200 20 G Anterior;Proximal;Right Forearm <1 day         Peripheral IV - Single Lumen 04/15/25 1200 20 G Distal;Posterior;Right Forearm <1 day                    Significant Labs:    Lab Results   Component Value Date    WBC 10.97 04/16/2025    HGB 13.1 (L) 04/16/2025    HCT 41.5 (L) 04/16/2025    .6 (H) 04/16/2025     (L) 04/16/2025           BMP  Lab Results   Component Value Date     04/16/2025    K 4.3 04/16/2025    CO2 18 (L) 04/16/2025    BUN 49.6 (H) 04/16/2025    CREATININE 2.05 (H) 04/16/2025    CALCIUM 8.3 (L) 04/16/2025    AGAP 14.0 04/16/2025         AB  Recent Labs    Lab 04/15/25  1547   PH 7.330*   PO2 87.0   PCO2 37.0   HCO3 19.5*   POCBASEDEF -5.80*       Mechanical Ventilation Support:  Vent Mode: A/C (04/16/25 0551)  Ventilator Initiated: Yes (04/15/25 1125)  Set Rate: 18 BPM (04/16/25 0551)  Vt Set: 450 mL (04/16/25 0551)  Pressure Support: 10 cmH20 (04/16/25 0551)  PEEP/CPAP: 5 cmH20 (04/16/25 0551)  Oxygen Concentration (%): 30 (04/16/25 0551)  Peak Airway Pressure: 30 cmH20 (04/16/25 0551)  Total Ve: 6.8 L/m (04/16/25 0551)  F/VT Ratio<105 (RSBI): (!) 47.37 (04/15/25 1740)      Significant Imaging:  I have reviewed the pertinent imaging within the past 24 hours.        Assessment/Plan:     Assessment  In-hospital (IMC) cardiac arrest secondary to third-degree AV block, s/p transvenous pacemaker.  Acute respiratory failure secondary to third-degree AV block s/p intubation  CARMEL secondary to ATN  Non-insulin-dependent diabetes mellitus type 2      Plan  Infectious workup underway, nothing revealing thus far  Continue current vent support.  Repeat ABG later today  Continue transvenous pacing as needed  Further workup per Cardiology    DVT Prophylaxis:  SCD  GI Prophylaxis:  None     31 minutes of critical care was time spent personally by me on the following activities: development of treatment plan with patient or surrogate and bedside caregivers, discussions with consultants, evaluation of patient's response to treatment, examination of patient, ordering and performing treatments and interventions, ordering and review of laboratory studies, ordering and review of radiographic studies, pulse oximetry, re-evaluation of patient's condition.  This critical care time did not overlap with that of any other provider or involve time for any procedures.     Nj Becerra MD  Pulmonary Critical Care Medicine  [facility] - 7th Floor ICU  DOS: 04/16/2025           [1]   Social History  Socioeconomic History    Marital status: Single   Tobacco Use    Smoking  status: Never    Smokeless tobacco: Never   Substance and Sexual Activity    Alcohol use: Never    Drug use: Never    Sexual activity: Not Currently

## 2025-04-16 NOTE — PLAN OF CARE
Problem: Adult Inpatient Plan of Care  Goal: Plan of Care Review  Outcome: Progressing  Goal: Patient-Specific Goal (Individualized)  Outcome: Progressing  Goal: Absence of Hospital-Acquired Illness or Injury  Outcome: Progressing  Goal: Optimal Comfort and Wellbeing  Outcome: Progressing  Goal: Readiness for Transition of Care  Outcome: Progressing     Problem: Delirium  Goal: Optimal Coping  Outcome: Progressing  Goal: Improved Behavioral Control  Outcome: Progressing  Goal: Improved Attention and Thought Clarity  Outcome: Progressing  Goal: Improved Sleep  Outcome: Progressing     Problem: Skin Injury Risk Increased  Goal: Skin Health and Integrity  Outcome: Progressing     Problem: Infection  Goal: Absence of Infection Signs and Symptoms  Outcome: Progressing

## 2025-04-17 DIAGNOSIS — R56.9 SEIZURES: Primary | ICD-10-CM

## 2025-04-17 LAB
ALBUMIN SERPL-MCNC: 2 G/DL (ref 3.5–5)
ALBUMIN/GLOB SERPL: 0.6 RATIO (ref 1.1–2)
ALP SERPL-CCNC: 79 UNIT/L (ref 40–150)
ALT SERPL-CCNC: 20 UNIT/L (ref 0–55)
ANION GAP SERPL CALC-SCNC: 8 MEQ/L
AST SERPL-CCNC: 41 UNIT/L (ref 11–45)
BACTERIA UR CULT: NO GROWTH
BASOPHILS # BLD AUTO: 0.02 X10(3)/MCL
BASOPHILS NFR BLD AUTO: 0.2 %
BILIRUB SERPL-MCNC: 0.9 MG/DL
BUN SERPL-MCNC: 43.3 MG/DL (ref 8.9–20.6)
CALCIUM SERPL-MCNC: 8.2 MG/DL (ref 8.4–10.2)
CHLORIDE SERPL-SCNC: 110 MMOL/L (ref 98–107)
CO2 SERPL-SCNC: 22 MMOL/L (ref 22–29)
CREAT SERPL-MCNC: 1.58 MG/DL (ref 0.72–1.25)
CREAT/UREA NIT SERPL: 27
EOSINOPHIL # BLD AUTO: 0.1 X10(3)/MCL (ref 0–0.9)
EOSINOPHIL NFR BLD AUTO: 1.2 %
ERYTHROCYTE [DISTWIDTH] IN BLOOD BY AUTOMATED COUNT: 15.3 % (ref 11.5–17)
GFR SERPLBLD CREATININE-BSD FMLA CKD-EPI: 54 ML/MIN/1.73/M2
GLOBULIN SER-MCNC: 3.5 GM/DL (ref 2.4–3.5)
GLUCOSE SERPL-MCNC: 117 MG/DL (ref 74–100)
HCT VFR BLD AUTO: 33 % (ref 42–52)
HGB BLD-MCNC: 10.9 G/DL (ref 14–18)
IMM GRANULOCYTES # BLD AUTO: 0.03 X10(3)/MCL (ref 0–0.04)
IMM GRANULOCYTES NFR BLD AUTO: 0.3 %
LYMPHOCYTES # BLD AUTO: 1.41 X10(3)/MCL (ref 0.6–4.6)
LYMPHOCYTES NFR BLD AUTO: 16.3 %
MAGNESIUM SERPL-MCNC: 2.1 MG/DL (ref 1.6–2.6)
MCH RBC QN AUTO: 34 PG (ref 27–31)
MCHC RBC AUTO-ENTMCNC: 33 G/DL (ref 33–36)
MCV RBC AUTO: 102.8 FL (ref 80–94)
MONOCYTES # BLD AUTO: 0.41 X10(3)/MCL (ref 0.1–1.3)
MONOCYTES NFR BLD AUTO: 4.7 %
NEUTROPHILS # BLD AUTO: 6.68 X10(3)/MCL (ref 2.1–9.2)
NEUTROPHILS NFR BLD AUTO: 77.3 %
NRBC BLD AUTO-RTO: 0 %
OHS QRS DURATION: 152 MS
OHS QTC CALCULATION: 488 MS
PHOSPHATE SERPL-MCNC: 2.4 MG/DL (ref 2.3–4.7)
PLATELET # BLD AUTO: 103 X10(3)/MCL (ref 130–400)
PMV BLD AUTO: 9.4 FL (ref 7.4–10.4)
POCT GLUCOSE: 123 MG/DL (ref 70–110)
POCT GLUCOSE: 125 MG/DL (ref 70–110)
POTASSIUM SERPL-SCNC: 3.8 MMOL/L (ref 3.5–5.1)
PROT SERPL-MCNC: 5.5 GM/DL (ref 6.4–8.3)
RBC # BLD AUTO: 3.21 X10(6)/MCL (ref 4.7–6.1)
SODIUM SERPL-SCNC: 140 MMOL/L (ref 136–145)
WBC # BLD AUTO: 8.65 X10(3)/MCL (ref 4.5–11.5)

## 2025-04-17 PROCEDURE — X2HK3V9 INSERTION OF DUAL-CHAMBER INTRACARDIAC PACEMAKER INTO RIGHT VENTRICLE, PERCUTANEOUS APPROACH, NEW TECHNOLOGY GROUP 9: ICD-10-PCS | Performed by: STUDENT IN AN ORGANIZED HEALTH CARE EDUCATION/TRAINING PROGRAM

## 2025-04-17 PROCEDURE — 94761 N-INVAS EAR/PLS OXIMETRY MLT: CPT

## 2025-04-17 PROCEDURE — 27100171 HC OXYGEN HIGH FLOW UP TO 24 HOURS

## 2025-04-17 PROCEDURE — 25500020 PHARM REV CODE 255: Performed by: STUDENT IN AN ORGANIZED HEALTH CARE EDUCATION/TRAINING PROGRAM

## 2025-04-17 PROCEDURE — 93010 ELECTROCARDIOGRAM REPORT: CPT | Mod: ,,, | Performed by: INTERNAL MEDICINE

## 2025-04-17 PROCEDURE — 93005 ELECTROCARDIOGRAM TRACING: CPT

## 2025-04-17 PROCEDURE — 80053 COMPREHEN METABOLIC PANEL: CPT | Performed by: INTERNAL MEDICINE

## 2025-04-17 PROCEDURE — 85025 COMPLETE CBC W/AUTO DIFF WBC: CPT | Performed by: NURSE PRACTITIONER

## 2025-04-17 PROCEDURE — 63600175 PHARM REV CODE 636 W HCPCS

## 2025-04-17 PROCEDURE — 99900035 HC TECH TIME PER 15 MIN (STAT)

## 2025-04-17 PROCEDURE — 63600175 PHARM REV CODE 636 W HCPCS: Performed by: STUDENT IN AN ORGANIZED HEALTH CARE EDUCATION/TRAINING PROGRAM

## 2025-04-17 PROCEDURE — 84100 ASSAY OF PHOSPHORUS: CPT | Performed by: INTERNAL MEDICINE

## 2025-04-17 PROCEDURE — 0795T TCAT INS 2CHMBR LDLS PM CMPL: CPT | Mod: Q0 | Performed by: STUDENT IN AN ORGANIZED HEALTH CARE EDUCATION/TRAINING PROGRAM

## 2025-04-17 PROCEDURE — X2H63V9 INSERTION OF DUAL-CHAMBER INTRACARDIAC PACEMAKER INTO RIGHT ATRIUM, PERCUTANEOUS APPROACH, NEW TECHNOLOGY GROUP 9: ICD-10-PCS | Performed by: STUDENT IN AN ORGANIZED HEALTH CARE EDUCATION/TRAINING PROGRAM

## 2025-04-17 PROCEDURE — 99900031 HC PATIENT EDUCATION (STAT)

## 2025-04-17 PROCEDURE — 94003 VENT MGMT INPAT SUBQ DAY: CPT

## 2025-04-17 PROCEDURE — 25000003 PHARM REV CODE 250: Performed by: INTERNAL MEDICINE

## 2025-04-17 PROCEDURE — 99900026 HC AIRWAY MAINTENANCE (STAT)

## 2025-04-17 PROCEDURE — 36415 COLL VENOUS BLD VENIPUNCTURE: CPT | Performed by: INTERNAL MEDICINE

## 2025-04-17 PROCEDURE — 83735 ASSAY OF MAGNESIUM: CPT | Performed by: INTERNAL MEDICINE

## 2025-04-17 PROCEDURE — 20000000 HC ICU ROOM

## 2025-04-17 PROCEDURE — 25000003 PHARM REV CODE 250: Performed by: STUDENT IN AN ORGANIZED HEALTH CARE EDUCATION/TRAINING PROGRAM

## 2025-04-17 PROCEDURE — 63600175 PHARM REV CODE 636 W HCPCS: Performed by: INTERNAL MEDICINE

## 2025-04-17 RX ORDER — VANCOMYCIN HCL IN 5 % DEXTROSE 1G/250ML
1000 PLASTIC BAG, INJECTION (ML) INTRAVENOUS
Status: CANCELLED | OUTPATIENT
Start: 2025-04-17

## 2025-04-17 RX ORDER — ARIPIPRAZOLE 5 MG/1
5 TABLET ORAL DAILY
Status: DISCONTINUED | OUTPATIENT
Start: 2025-04-18 | End: 2025-04-23 | Stop reason: HOSPADM

## 2025-04-17 RX ORDER — LIDOCAINE HYDROCHLORIDE 10 MG/ML
INJECTION, SOLUTION INFILTRATION; PERINEURAL
Status: DISCONTINUED | OUTPATIENT
Start: 2025-04-17 | End: 2025-04-17 | Stop reason: HOSPADM

## 2025-04-17 RX ORDER — NAPROXEN SODIUM 220 MG/1
81 TABLET, FILM COATED ORAL DAILY
Status: DISCONTINUED | OUTPATIENT
Start: 2025-04-17 | End: 2025-04-23 | Stop reason: HOSPADM

## 2025-04-17 RX ORDER — CEFAZOLIN SODIUM 1 G/3ML
INJECTION, POWDER, FOR SOLUTION INTRAMUSCULAR; INTRAVENOUS
Status: DISCONTINUED | OUTPATIENT
Start: 2025-04-17 | End: 2025-04-17 | Stop reason: HOSPADM

## 2025-04-17 RX ORDER — IOPAMIDOL 755 MG/ML
INJECTION, SOLUTION INTRAVASCULAR
Status: DISCONTINUED | OUTPATIENT
Start: 2025-04-17 | End: 2025-04-17 | Stop reason: HOSPADM

## 2025-04-17 RX ORDER — HYDRALAZINE HYDROCHLORIDE 20 MG/ML
10 INJECTION INTRAMUSCULAR; INTRAVENOUS EVERY 4 HOURS PRN
Status: DISCONTINUED | OUTPATIENT
Start: 2025-04-17 | End: 2025-04-23 | Stop reason: HOSPADM

## 2025-04-17 RX ADMIN — ASPIRIN 81 MG CHEWABLE TABLET 81 MG: 81 TABLET CHEWABLE at 08:04

## 2025-04-17 RX ADMIN — PROPOFOL 15 MCG/KG/MIN: 10 INJECTION, EMULSION INTRAVENOUS at 07:04

## 2025-04-17 RX ADMIN — PROPOFOL 20 MCG/KG/MIN: 10 INJECTION, EMULSION INTRAVENOUS at 02:04

## 2025-04-17 RX ADMIN — DEXMEDETOMIDINE HYDROCHLORIDE 0.4 MCG/KG/HR: 400 INJECTION INTRAVENOUS at 12:04

## 2025-04-17 RX ADMIN — ARIPIPRAZOLE 5 MG: 5 TABLET ORAL at 08:04

## 2025-04-17 RX ADMIN — SODIUM CHLORIDE, POTASSIUM CHLORIDE, SODIUM LACTATE AND CALCIUM CHLORIDE: 600; 310; 30; 20 INJECTION, SOLUTION INTRAVENOUS at 03:04

## 2025-04-17 RX ADMIN — MUPIROCIN: 20 OINTMENT TOPICAL at 08:04

## 2025-04-17 RX ADMIN — PROPOFOL 50 MCG/KG/MIN: 10 INJECTION, EMULSION INTRAVENOUS at 02:04

## 2025-04-17 RX ADMIN — MUPIROCIN: 20 OINTMENT TOPICAL at 07:04

## 2025-04-17 RX ADMIN — DEXMEDETOMIDINE HYDROCHLORIDE 0.6 MCG/KG/HR: 400 INJECTION INTRAVENOUS at 07:04

## 2025-04-17 RX ADMIN — HYDRALAZINE HYDROCHLORIDE 10 MG: 20 INJECTION INTRAMUSCULAR; INTRAVENOUS at 10:04

## 2025-04-17 NOTE — PLAN OF CARE
When patient is discharged, report will need to be called to [facility] [phone] and then , [facility], [phone] for transportation.

## 2025-04-17 NOTE — PROGRESS NOTES
[facility]    Cardiology  Progress Note    Patient Name: Bacilio Arguelles  MRN: [id number]  Admission Date: 4/15/2025  Hospital Length of Stay: 2 days  Code Status: Full Code   Attending Provider: Nj Becerra MD   Consulting Provider: ANTHONY Law  Primary Care Physician: Malik Zavala MD  Principal Problem:<principal problem not specified>    Patient information was obtained from past medical records and ER records.     Subjective:     Chief Complaint/Reason for Consult: Arrest/Bradycardia     HPI: Mr. Arguelles is a 50 y/o male who is unknown to [facility]. The patient presented to [facility] on 4.15.25 [place] [facility] [place] Cardiac Arrest with ROSC. Neurological Recovery was identified despite being Intubated/Ventilated for Airway Protection. He passed out and was unresponsive for about 2 minutes at the group home. He was found to have a HR in the 20s-30s, given Atropine and ACLS/CPR for 2 Minutes with ROSCH. He was note on any AV Luis A Blocking Agents/Rate Lowering Medications. He was noted to be in a High Grade AVB and underwent a TVP Placement and LHC with Non-Obstructive CAD. He did have an Elevated WBC Count and was Running a Temperature of > 101F. [facility] was consulted for Device Implant.     4.16.25: NAD. A.Febrile. WBC Normal. BC Pending. Vented/Sedated. Remains in Intermittent CHB/Back Up TVP. , BUN/Crea 49.6/2.05  4.17.25: NAD. Vented/Sedated. Remains A.Febrile. WBC Normal. BC Negative at 24 Hours. Intermittent CHB requiring TVP/R IJ. , BUN/Crea 43.3/1.58.    PMH: Anxiety, Depression, DM II, GERD, HLD, Hypothyroidism, Intellectual Disability, Mood Disorder, Seizure Disorder  PSH: Arthroplasty of Bilateral Hips  Family History: Unable to Assess  Social History: Denies Illicit Drug, ETOH and Tobacco Use     Previous Cardiac Diagnostics:   ECHO 4.15.25:  Left Ventricle: The left ventricle is normal in size. Mildly increased wall thickness.  There is normal systolic function with a visually estimated ejection fraction of 60 - 65%.  Right Ventricle: The right ventricle is normal in size. Systolic function is normal.  Mitral Valve: Mildly calcified anterior leaflet. There is mild regurgitation.  IVC/SVC: Patient is ventilated, cannot use IVC diameter to estimate right atrial pressure.    ECHO 4.14.25:  LV Systolic Function is Significant Inferior Wall Motion Abnormality. Mildly Hypokinetic Anterior-Septal Wall  Estimated EF 30%  RV Function is Decreased  Trace AI  Mild MR  Trace PI  No Pericardial Effusion     LHC 4.14.25:  Separate Ostial for The LAD and LCX  LAD - Mild CAD  LCX - Mild CAD   RCA - Small, Non-Dominant Vessel with No Significant CAD  Non-Obstructive CAD  Elevated Troponin likely due to Asystolic Event  NICMO    Review of patient's allergies indicates:  No Known Allergies  No current facility-administered medications on file prior to encounter.     Current Outpatient Medications on File Prior to Encounter   Medication Sig    alfuzosin (UROXATRAL) 10 mg Tb24 Take 10 mg by mouth daily with dinner or evening meal.    ARIPiprazole (ABILIFY) 5 MG Tab Take 5 mg by mouth.    aspirin (ECOTRIN) 81 MG EC tablet Aspir-81 mg tablet,delayed release   Take 1 tablet every day by oral route.    atorvastatin (LIPITOR) 20 MG tablet atorvastatin 20 mg tablet    DAYVIGO 5 mg Tab Take 1 tablet by mouth.    divalproex (DEPAKOTE) 125 MG EC tablet Take 125 mg by mouth every evening.    fenofibrate (TRICOR) 145 MG tablet fenofibrate nanocrystallized 145 mg tablet    folic acid (FOLVITE) 1 MG tablet Take 1 tablet (1 mg total) by mouth once daily.    furosemide (LASIX) 40 MG tablet Take 0.5 tablets (20 mg total) by mouth daily as needed (Swelling, shortness of breath, rapid weight gain of 2 lb overnight).    glipiZIDE 5 MG TR24 Take 1 tablet (5 mg total) by mouth daily with breakfast.    levothyroxine (SYNTHROID) 25 MCG tablet levothyroxine 25 mcg tablet     paliperidone palmitate (INVEGA SUSTENNA) 117 mg/0.75 mL Syrg injection Invega Sustenna 117 mg/0.75 mL intramuscular syringe    pantoprazole (PROTONIX) 40 MG tablet Take 40 mg by mouth.    propranolol HCl (PROPRANOLOL ORAL) Take 5 mg by mouth 2 (two) times a day.    sodium bicarbonate 650 MG tablet Take 1 tablet (650 mg total) by mouth 3 (three) times daily. for 7 days    TRINTELLIX 20 mg Tab Take 1 tablet by mouth.    ursodioL (ACTIGALL) 300 mg capsule Take by mouth 3 (three) times daily.     Review of Systems   Unable to perform ROS: Intubated     Objective:     Vital Signs (Most Recent):  Temp: 98.2 °F (36.8 °C) (04/17/25 0400)  Pulse: 63 (04/17/25 0600)  Resp: 18 (04/17/25 0600)  BP: 129/75 (04/17/25 0600)  SpO2: 98 % (04/17/25 0600) Vital Signs (24h Range):  Temp:  [98.2 °F (36.8 °C)-99 °F (37.2 °C)] 98.2 °F (36.8 °C)  Pulse:  [53-85] 63  Resp:  [14-23] 18  SpO2:  [96 %-100 %] 98 %  BP: (104-144)/(60-85) 129/75   Weight: 79.8 kg (175 lb 14.8 oz)  Body mass index is 30.2 kg/m².  SpO2: 98 %       Intake/Output Summary (Last 24 hours) at 4/17/2025 0843  Last data filed at 4/17/2025 0622  Gross per 24 hour   Intake 1846.37 ml   Output 1785 ml   Net 61.37 ml     Lines/Drains/Airways       Drain  Duration                  NG/OG Tube 04/15/25 1200 Center mouth 1 day         Urethral Catheter 04/15/25 2000 1 day              Airway  Duration                  Airway - Non-Surgical 04/15/25 1200 1 day              Peripheral Intravenous Line  Duration                  Peripheral IV - Single Lumen 04/15/25 1200 20 G Anterior;Proximal;Right Forearm 1 day         Peripheral IV - Single Lumen 04/15/25 1200 20 G Distal;Posterior;Right Forearm 1 day                  Significant Labs:   Chemistries:   Recent Labs   Lab 04/15/25  1317 04/16/25  0317 04/17/25  0304    142 140   K 5.2* 4.3 3.8   * 110* 110*   CO2 21* 18* 22   BUN 48.4* 49.6* 43.3*   CREATININE 2.14* 2.05* 1.58*   CALCIUM 8.6 8.3* 8.2*   BILITOT 0.8 1.0  "0.9   ALKPHOS 77 79 79   ALT 18 20 20   AST 55* 46* 41   GLUCOSE 153* 157* 117*   MG 1.60 3.00* 2.10   PHOS 3.1 3.4 2.4        CBC/Anemia Labs: Coags:    Recent Labs   Lab 04/15/25  1317 04/16/25  0317 04/17/25  0304   WBC 8.71 10.97 8.65   HGB 12.5* 13.1* 10.9*   HCT 39.5* 41.5* 33.0*   * 109* 103*   .9* 105.6* 102.8*   RDW 15.9 15.5 15.3    No results for input(s): "PT", "INR", "APTT" in the last 168 hours.     EKG:       Telemetry: Intermittent CHB/Not Paced    Physical Exam  Constitutional:       General: He is not in acute distress.     Appearance: Normal appearance.      Comments: Vented/Sedated   HENT:      Head: Normocephalic.      Mouth/Throat:      Mouth: Mucous membranes are moist.   Cardiovascular:      Rate and Rhythm: Normal rate and regular rhythm.      Pulses: Normal pulses.      Heart sounds: Normal heart sounds. No murmur heard.  Pulmonary:      Effort: Pulmonary effort is normal. No respiratory distress.      Comments: Ventilator Associated Breath Sounds  Vent Mode: A/C  Oxygen Concentration (%):  [30-40] 40  Resp Rate Total:  [18 br/min-19 br/min] 18 br/min  Vt Set:  [450 mL] 450 mL  PEEP/CPAP:  [5 cmH20] 5 cmH20  Mean Airway Pressure:  [11 zkH55-53 cmH20] 12 cmH20  Abdominal:      Palpations: Abdomen is soft.   Skin:     General: Skin is warm.      Comments: R IJ TVP/Dressing C/D/I   Neurological:      Comments: Vented/Sedated       Home Medications:   Medications Ordered Prior to Encounter[1]  Current Schedule Inpatient Medications:   ARIPiprazole  5 mg Oral Daily    aspirin  81 mg Per OG tube Daily    mupirocin   Nasal BID     Continuous Infusions:   dexmedeTOMIDine (Precedex) infusion (titrating)  0-1.4 mcg/kg/hr Intravenous Continuous 7.98 mL/hr at 04/17/25 0622 0.4 mcg/kg/hr at 04/17/25 0622    lactated ringers   Intravenous Continuous 100 mL/hr at 04/17/25 0622 Rate Verify at 04/17/25 0622    propofoL  0-50 mcg/kg/min Intravenous Continuous 14.4 mL/hr at 04/17/25 0622 30 " mcg/kg/min at 04/17/25 0622     Assessment:   Syncope due to High Grade AVB    - s/p TVP (4.14.25)  Cardiopulmonary Arrest    - Given Atropine/ACLS x 2 Minutes with ROSC - Following Simple Commands on Ventilator  Acute Hypoxemic Respiratory Failure requiring Intubation/Ventilation  CARMEL - Improving   Febrile - Resolved    - BC x 2 - Negative at 24 Hours   Electrolyte Derangements - Hyperkalemia - Resolved   NICMO/EF 30%    - LHC (4.14.25) - Separate Ostial for The LAD and LCX; LAD - Mild CAD; LCX - Mild CAD; RCA - Small, Non-Dominant Vessel with No Significant CAD; Non-Obstructive CAD  NSTEMI Type II in the Setting of Cardiopulmonary Arrest  DM II  HTN  HLD  Anxiety/Depression  Intellectual Disability  GERD  Mood Disorder  Bipolar Disorder  Seizure Disorder  No Hx of GIB     Plan:   Continue TVP  Vent per Primary Team  Keep K > 4.0 and Mg > 2.0   Hold all AV Luis A Blocking Agents and Rate Lowering Medications  Keep NPO  Will Proceed with Dual Chamber Leadless PPM Implant Today with Dr. Hernandez  Risk, Benefits and Alternatives Reviewed and Discussed with the PT and their Family and they wish to proceed with above Procedure. (Consents on Chart)  Labs and EKG in AM: CBC, CMP and Mg     Lavon Burt, ANTHONY  Cardiology  [facility]        [1]   No current facility-administered medications on file prior to encounter.     Current Outpatient Medications on File Prior to Encounter   Medication Sig Dispense Refill    alfuzosin (UROXATRAL) 10 mg Tb24 Take 10 mg by mouth daily with dinner or evening meal.      ARIPiprazole (ABILIFY) 5 MG Tab Take 5 mg by mouth.      aspirin (ECOTRIN) 81 MG EC tablet Aspir-81 mg tablet,delayed release   Take 1 tablet every day by oral route.      atorvastatin (LIPITOR) 20 MG tablet atorvastatin 20 mg tablet      DAYVIGO 5 mg Tab Take 1 tablet by mouth.      divalproex (DEPAKOTE) 125 MG EC tablet Take 125 mg by mouth every evening.      fenofibrate (TRICOR) 145 MG  tablet fenofibrate nanocrystallized 145 mg tablet      folic acid (FOLVITE) 1 MG tablet Take 1 tablet (1 mg total) by mouth once daily. 30 tablet 0    furosemide (LASIX) 40 MG tablet Take 0.5 tablets (20 mg total) by mouth daily as needed (Swelling, shortness of breath, rapid weight gain of 2 lb overnight).      glipiZIDE 5 MG TR24 Take 1 tablet (5 mg total) by mouth daily with breakfast. 30 tablet 0    levothyroxine (SYNTHROID) 25 MCG tablet levothyroxine 25 mcg tablet      paliperidone palmitate (INVEGA SUSTENNA) 117 mg/0.75 mL Syrg injection Invega Sustenna 117 mg/0.75 mL intramuscular syringe      pantoprazole (PROTONIX) 40 MG tablet Take 40 mg by mouth.      propranolol HCl (PROPRANOLOL ORAL) Take 5 mg by mouth 2 (two) times a day.      sodium bicarbonate 650 MG tablet Take 1 tablet (650 mg total) by mouth 3 (three) times daily. for 7 days 21 tablet 0    TRINTELLIX 20 mg Tab Take 1 tablet by mouth.      ursodioL (ACTIGALL) 300 mg capsule Take by mouth 3 (three) times daily.

## 2025-04-17 NOTE — PROGRESS NOTES
[facility] - 7th Floor ICU  Pulmonary Critical Care Note    Patient Name: Bacilio Arguelles  MRN: [id number]  Admission Date: 4/15/2025  Hospital Length of Stay: 2 days  Code Status: Full Code  Attending Provider: Nj Becerra MD  Primary Care Provider: Malik Zavala MD     Subjective:     HPI:   Fortunately old male with past medical history of non-insulin-dependent type 2 diabetes mellitus, hypothyroidism, seizure disorder, unspecified psychiatric disorder, intellectual disability transferred from [facility] for permanent pacemaker placement.  Patient passed out and was unresponsive for 2 minutes at group home, with heart rate ranging between 20s- 30s, given atropine, in the ER he was found to be in complete heart block, later went into asystole, ROSC achieved 2 minutes post CPR.  Immediately taken to cath lab and had a transvenous pacemaker placed and then admitted to ICU.  Per chart review, not on any AV germania blocking agents, no reversible causes identified, transferred to evaluate/need for permanent pacemaker placement.  No history is obtainable at this time as patient is intubated and sedated.      Past 24 hours:  Patient has remained intubated sedated and mechanically ventilated.  He is on 100% V paced at 60 currently although he did have an intrinsic rhythm up to the 70s at some point last night.  No fevers overnight.  Slated for permanent pacemaker placement today      Past Medical History:   Diagnosis Date    Anxiety disorder, unspecified     Depression     DM (diabetes mellitus), type 2     GERD (gastroesophageal reflux disease)     HLD (hyperlipidemia)     Hypothyroidism, unspecified     Intellectual disability     Mood disorder     Seizure disorder        Past Surgical History:   Procedure Laterality Date    ARTHROPLASTY,HIP,TOTAL, BILATERAL, POSTERIOR APPROACH Bilateral        Social History[1]        Current Outpatient Medications   Medication Instructions     alfuzosin (UROXATRAL) 10 mg, Oral, With dinner    ARIPiprazole (ABILIFY) 5 mg, Oral    aspirin (ECOTRIN) 81 MG EC tablet Aspir-81 mg tablet,delayed release   Take 1 tablet every day by oral route.    atorvastatin (LIPITOR) 20 MG tablet atorvastatin 20 mg tablet    DAYVIGO 5 mg Tab 1 tablet, Oral    divalproex (DEPAKOTE) 125 mg, Oral, Nightly    fenofibrate (TRICOR) 145 MG tablet fenofibrate nanocrystallized 145 mg tablet    folic acid (FOLVITE) 1 mg, Oral, Daily    furosemide (LASIX) 20 mg, Oral, Daily PRN    glipiZIDE 5 mg, Oral, With breakfast    levothyroxine (SYNTHROID) 25 MCG tablet levothyroxine 25 mcg tablet    paliperidone palmitate (INVEGA SUSTENNA) 117 mg/0.75 mL Syrg injection Invega Sustenna 117 mg/0.75 mL intramuscular syringe    pantoprazole (PROTONIX) 40 mg, Oral    propranolol HCl (PROPRANOLOL ORAL) 5 mg, Oral, 2 times daily    sodium bicarbonate 650 mg, Oral, 3 times daily    TRINTELLIX 20 mg Tab 1 tablet, Oral    ursodioL (ACTIGALL) 300 mg capsule Oral, 3 times daily       Review of patient's allergies indicates:  No Known Allergies     Current Inpatient Medications   ARIPiprazole  5 mg Oral Daily    aspirin  81 mg Oral Daily    mupirocin   Nasal BID       Current Intravenous Infusions   dexmedeTOMIDine (Precedex) infusion (titrating)  0-1.4 mcg/kg/hr Intravenous Continuous 7.98 mL/hr at 04/17/25 0622 0.4 mcg/kg/hr at 04/17/25 0622    lactated ringers   Intravenous Continuous 100 mL/hr at 04/17/25 0622 Rate Verify at 04/17/25 0622    propofoL  0-50 mcg/kg/min Intravenous Continuous 14.4 mL/hr at 04/17/25 0622 30 mcg/kg/min at 04/17/25 0622            Unable to do review of systems as patient is intubated and sedated.    Objective:       Intake/Output Summary (Last 24 hours) at 4/17/2025 0804  Last data filed at 4/17/2025 0622  Gross per 24 hour   Intake 1846.37 ml   Output 1785 ml   Net 61.37 ml         Vital Signs (Most Recent):  Temp: 98.2 °F (36.8 °C) (04/17/25 0400)  Pulse: 63 (04/17/25  0600)  Resp: 18 (04/17/25 0600)  BP: 129/75 (04/17/25 0600)  SpO2: 98 % (04/17/25 0600)  Body mass index is 30.2 kg/m².  Weight: 79.8 kg (175 lb 14.8 oz) Vital Signs (24h Range):  Temp:  [98.2 °F (36.8 °C)-99 °F (37.2 °C)] 98.2 °F (36.8 °C)  Pulse:  [53-85] 63  Resp:  [14-23] 18  SpO2:  [96 %-100 %] 98 %  BP: (104-144)/(60-85) 129/75     Physical Exam  Vital signs and nursing notes reviewed.  Constitutional: NAD.  Intubated and sedated.  Head: Atraumatic. Normocephalic.  Eyes: Conjunctivae nl. No scleral icterus.  ENT: Mucous membranes are moist. Oropharynx is clear.  Neck: Supple. No lymphadenopathy.  Transvenous pacemaker in the right internal jugular  Cardiovascular: Regular rate and rhythm. No murmurs, rubs, or gallops. Distal pulses are 2+ and symmetric.  Pulmonary/Chest: No respiratory distress. Clear to auscultation bilaterally. No wheezing, rales, or rhonchi.  Abdominal: Soft. Non-distended. No TTP. No rebound, guarding, or rigidity.   Musculoskeletal:  Trace bilateral pedal edema.  Skin: Warm and dry.  Neurological:  Unable to do thorough neurological examination as patient is sedated.  Bilateral pupillary reflexes sluggish present.    Lines/Drains/Airways       Drain  Duration                  NG/OG Tube 04/15/25 1200 Center mouth 1 day         Urethral Catheter 04/15/25 2000 1 day              Airway  Duration                  Airway - Non-Surgical 04/15/25 1200 1 day              Peripheral Intravenous Line  Duration                  Peripheral IV - Single Lumen 04/15/25 1200 20 G Anterior;Proximal;Right Forearm 1 day         Peripheral IV - Single Lumen 04/15/25 1200 20 G Distal;Posterior;Right Forearm 1 day                    Significant Labs:    Lab Results   Component Value Date    WBC 8.65 04/17/2025    HGB 10.9 (L) 04/17/2025    HCT 33.0 (L) 04/17/2025    .8 (H) 04/17/2025     (L) 04/17/2025           BMP  Lab Results   Component Value Date     04/17/2025    K 3.8 04/17/2025     CO2 22 04/17/2025    BUN 43.3 (H) 04/17/2025    CREATININE 1.58 (H) 04/17/2025    CALCIUM 8.2 (L) 04/17/2025    AGAP 8.0 04/17/2025         ABG  Recent Labs   Lab 04/15/25  1547   PH 7.330*   PO2 87.0   PCO2 37.0   HCO3 19.5*   POCBASEDEF -5.80*       Mechanical Ventilation Support:  Vent Mode: A/C (04/17/25 0500)  Ventilator Initiated: Yes (04/15/25 1125)  Set Rate: 18 BPM (04/17/25 0500)  Vt Set: 450 mL (04/17/25 0500)  Pressure Support: 10 cmH20 (04/16/25 0551)  PEEP/CPAP: 5 cmH20 (04/17/25 0500)  Oxygen Concentration (%): 40 (04/17/25 0500)  Peak Airway Pressure: 34 cmH20 (04/17/25 0500)  Total Ve: 7.2 L/m (04/17/25 0500)  F/VT Ratio<105 (RSBI): (!) 45 (04/17/25 0500)      Significant Imaging:          Assessment/Plan:     Assessment  In-hospital (IMC) cardiac arrest secondary to third-degree AV block, s/p transvenous pacemaker.  Acute respiratory failure secondary to third-degree AV block s/p intubation  CARMEL secondary to ATN  Non-insulin-dependent diabetes mellitus type 2      Plan  All cultures are negative  Continue current vent support.   Continue transvenous pacing as needed until permanent pacemaker placement later today      DVT Prophylaxis:  SCD  GI Prophylaxis:  None     31 minutes of critical care was time spent personally by me on the following activities: development of treatment plan with patient or surrogate and bedside caregivers, discussions with consultants, evaluation of patient's response to treatment, examination of patient, ordering and performing treatments and interventions, ordering and review of laboratory studies, ordering and review of radiographic studies, pulse oximetry, re-evaluation of patient's condition.  This critical care time did not overlap with that of any other provider or involve time for any procedures.     Nj Becerra MD  Pulmonary Critical Care Medicine  [facility] - 7th Floor ICU  DOS: 04/17/2025           [1]   Social History  Socioeconomic  History    Marital status: Single   Tobacco Use    Smoking status: Never    Smokeless tobacco: Never   Substance and Sexual Activity    Alcohol use: Never    Drug use: Never    Sexual activity: Not Currently     Social Drivers of Health     Financial Resource Strain: Patient Unable To Answer (4/17/2025)    Overall Financial Resource Strain (CARDIA)     Difficulty of Paying Living Expenses: Patient unable to answer   Food Insecurity: Patient Unable To Answer (4/17/2025)    Hunger Vital Sign     Worried About Running Out of Food in the Last Year: Patient unable to answer     Ran Out of Food in the Last Year: Patient unable to answer   Stress: Patient Unable To Answer (4/17/2025)    [employer] [place] [employer] - Occupational Stress Questionnaire     Feeling of Stress : Patient unable to answer   Housing Stability: Patient Unable To Answer (4/17/2025)    Housing Stability Vital Sign     Unable to Pay for Housing in the Last Year: Patient unable to answer     Homeless in the Last Year: Patient unable to answer

## 2025-04-17 NOTE — PROGRESS NOTES
Inpatient Nutrition Assessment    Admit Date: 4/15/2025   Total duration of encounter: 2 days   Patient Age: 49 y.o.    Nutrition Recommendation/Prescription     If propofol/ventilator continues, start tube feeding when feasible:  Peptamen Intense VHP goal rate 30 ml/hr   [id number] TID  to provide  840 kcal  96% needs, 109% needs with propofol calories considered  116 g protein  98% needs  45 g carbohydrate 51% needs  504 ml free water 32% needs  calculations based on estimated 20 hour run time     If propofol stopped, can increase tube feeding goal rate to 65 ml/hr and discontinue protein modular:  Peptamen Intense VHP goal rate 65 ml/hr   to provide  1300 kcal  116% needs  120 g protein  102% needs  98 g carbohydrate 100% needs  1092 ml free water 68% needs  calculations based on estimated 20 hour run time     Communication of Recommendations: reviewed with nurse    Nutrition Assessment     Malnutrition Assessment/Nutrition-Focused Physical Exam    Malnutrition Context: acute illness or injury (04/16/25 1422)  Malnutrition Level: other (see comments) (Does not meet criteria) (04/16/25 1422)                                                        A minimum of two characteristics is recommended for diagnosis of either severe or non-severe malnutrition.    Chart Review    Reason Seen: follow-up    Malnutrition Screening Tool Results   Have you recently lost weight without trying?: Unsure  Have you been eating poorly because of a decreased appetite?: No   MST Score: 2   Diagnosis:  In-hospital ([facility]) cardiac arrest secondary to third-degree AV block, s/p transvenous pacemaker.  Acute respiratory failure secondary to third-degree AV block s/p intubation  CARMEL secondary to ATN  Non-insulin-dependent diabetes mellitus type 2    Relevant Medical History: non-insulin-dependent type 2 diabetes mellitus, hypothyroidism, seizure disorder, unspecified psychiatric disorder, intellectual disability     Scheduled  Medications:  ARIPiprazole, 5 mg, Daily  aspirin, 81 mg, Daily  mupirocin, , BID    Continuous Infusions:  dexmedeTOMIDine (Precedex) infusion (titrating), Last Rate: 0.4 mcg/kg/hr (04/17/25 0622)  lactated ringers, Last Rate: 100 mL/hr at 04/17/25 0622  propofoL, Last Rate: 30 mcg/kg/min (04/17/25 0622)    PRN Medications:   dextrose 50%, 12.5 g, PRN  glucagon (human recombinant), 1 mg, PRN  insulin aspart U-100, 0-5 Units, Q6H PRN  sodium chloride 0.9%, 10 mL, PRN    Calorie Containing IV Medications: Diprivan @ 14.4 ml/hr (provides 380 kcal/d)    Recent Labs   Lab 04/15/25  1317 04/15/25  1637 04/16/25  0317 04/17/25  0304     --  142 140   K 5.2*  --  4.3 3.8   CALCIUM 8.6  --  8.3* 8.2*   PHOS 3.1  --  3.4 2.4   MG 1.60  --  3.00* 2.10   *  --  110* 110*   CO2 21*  --  18* 22   BUN 48.4*  --  49.6* 43.3*   CREATININE 2.14*  --  2.05* 1.58*   EGFRNORACEVR 37  --  39 54   GLUCOSE 153*  --  157* 117*   BILITOT 0.8  --  1.0 0.9   ALKPHOS 77  --  79 79   ALT 18  --  20 20   AST 55*  --  46* 41   ALBUMIN 2.3*  --  2.2* 2.0*   AMMONIA  --  40.9  --   --    WBC 8.71  --  10.97 8.65   HGB 12.5*  --  13.1* 10.9*   HCT 39.5*  --  41.5* 33.0*     Nutrition Orders:  Diet NPO      Appetite/Oral Intake: NPO/not applicable  Factors Affecting Nutritional Intake: NPO and on mechanical ventilation  Social Needs Impacting Access to Food: unable to assess at this time; will attempt on follow-up  Food/Islam/Cultural Preferences: unable to obtain  Food Allergies: none reported  Last Bowel Movement: 04/14/25  Wound(s): no pressure injuries documented at this time     Comments    4/16/25 Patient on mechanical ventilation, receiving calories from propofol, recommend tube feeding when feasible, nurse reports possible extubation today; recommend Peptamen Intense VHP with protein modular to best meet needs at this time.    4/17/25 Patient remains on ventilator and propofol (decreased), nurse reports plans for pacemaker  "placement today and extubation attempt after, tube feeding recommendation provided in case oral intake remains not feasible.    Anthropometrics    Height: 5' 4" (162.6 cm), Height Method: Estimated  Last Weight: 79.8 kg (175 lb 14.8 oz) (04/15/25 1307), Weight Method: Bed Scale  BMI (Calculated): 30.2  BMI Classification: obese grade I (BMI 30-34.9)        Ideal Body Weight (IBW), Male: 130 lb     % Ideal Body Weight, Male (lb): 135.33 %                          Usual Weight Provided By: unable to obtain usual weight    Wt Readings from Last 5 Encounters:   04/15/25 79.8 kg (175 lb 14.8 oz)   06/06/23 80 kg (176 lb 4.8 oz)   05/08/23 82.6 kg (182 lb 1.6 oz)   09/06/21 83.9 kg (184 lb 15.5 oz)     Weight Change(s) Since Admission:   4/16/25 verified weight of 79.8 kg on bed scale during rounds  Wt Readings from Last 1 Encounters:   04/15/25 1307 79.8 kg (175 lb 14.8 oz)   Admit Weight: 79.8 kg (175 lb 14.8 oz) (04/15/25 1307), Weight Method: Bed Scale    Estimated Needs    Weight Used For Calorie Calculations: 79.8 kg (175 lb 14.8 oz)  Energy Calorie Requirements (kcal): 877.8-1,117.2, 11-14 kcal/kg     Weight Used For Protein Calculations: 59 kg (130 lb)  Protein Requirements: 118 g, 2 g/kg  Fluid Requirements (mL): 1596, 20 ml/kg  CHO Requirement:  g, 40-50% of kcal     Enteral Nutrition Patient not receiving enteral nutrition at this time.    Parenteral Nutrition Patient not receiving parenteral nutrition support at this time.    Evaluation of Received Nutrient Intake    Calories: not meeting estimated needs  Protein: not meeting estimated needs    Patient Education Not applicable.    Nutrition Diagnosis     PES: Inadequate energy intake related to inability to consume sufficient nutrients as evidenced by less than 80% needs met. (resolved)    PES: N/A           Nutrition Interventions     Intervention(s): modified composition of enteral nutrition, modified rate of enteral nutrition, and collaboration with " other providers  Intervention(s): N/A       Goal: Meet greater than 80% of nutritional needs by follow-up. (goal progressing)  Goal: Tolerate enteral feeding at goal rate by follow-up. (goal progressing)    Nutrition Goals & Monitoring     Dietitian will monitor: energy intake, enteral nutrition intake, parenteral nutrition intake, weight, electrolyte/renal panel, beliefs/attitudes, glucose/endocrine profile, and gastrointestinal profile  Discharge planning: too early to determine; pending clinical course  Nutrition Risk/Follow-Up: high (follow-up in 1-4 days)   Please consult if re-assessment needed sooner.

## 2025-04-18 LAB
ALBUMIN SERPL-MCNC: 1.8 G/DL (ref 3.5–5)
ALBUMIN SERPL-MCNC: 2.1 G/DL (ref 3.5–5)
ALBUMIN/GLOB SERPL: 0.4 RATIO (ref 1.1–2)
ALBUMIN/GLOB SERPL: 0.5 RATIO (ref 1.1–2)
ALLENS TEST BLOOD GAS (OHS): YES
ALLENS TEST BLOOD GAS (OHS): YES
ALP SERPL-CCNC: 77 UNIT/L (ref 40–150)
ALP SERPL-CCNC: 90 UNIT/L (ref 40–150)
ALT SERPL-CCNC: 21 UNIT/L (ref 0–55)
ALT SERPL-CCNC: 27 UNIT/L (ref 0–55)
ANION GAP SERPL CALC-SCNC: 10 MEQ/L
ANION GAP SERPL CALC-SCNC: 12 MEQ/L
ANION GAP SERPL CALC-SCNC: 18 MEQ/L
AST SERPL-CCNC: 45 UNIT/L (ref 11–45)
AST SERPL-CCNC: 65 UNIT/L (ref 11–45)
BASE EXCESS BLD CALC-SCNC: -5.8 MMOL/L (ref -2–2)
BASE EXCESS BLD CALC-SCNC: -6.2 MMOL/L
BASOPHILS # BLD AUTO: 0.02 X10(3)/MCL
BASOPHILS # BLD AUTO: 0.03 X10(3)/MCL
BASOPHILS NFR BLD AUTO: 0.3 %
BASOPHILS NFR BLD AUTO: 0.5 %
BILIRUB SERPL-MCNC: 0.9 MG/DL
BILIRUB SERPL-MCNC: 1.2 MG/DL
BLOOD GAS SAMPLE TYPE (OHS): ABNORMAL
BLOOD GAS SAMPLE TYPE (OHS): ABNORMAL
BUN SERPL-MCNC: 41.6 MG/DL (ref 8.9–20.6)
BUN SERPL-MCNC: 47.3 MG/DL (ref 8.9–20.6)
BUN SERPL-MCNC: 48.1 MG/DL (ref 8.9–20.6)
CA-I BLD-SCNC: 1.23 MMOL/L (ref 1.12–1.23)
CA-I BLD-SCNC: 1.27 MMOL/L (ref 1.12–1.23)
CALCIUM SERPL-MCNC: 8.4 MG/DL (ref 8.4–10.2)
CALCIUM SERPL-MCNC: 8.6 MG/DL (ref 8.4–10.2)
CALCIUM SERPL-MCNC: 8.8 MG/DL (ref 8.4–10.2)
CHLORIDE SERPL-SCNC: 109 MMOL/L (ref 98–107)
CHLORIDE SERPL-SCNC: 110 MMOL/L (ref 98–107)
CHLORIDE SERPL-SCNC: 113 MMOL/L (ref 98–107)
CO2 BLDA-SCNC: 18.5 MMOL/L
CO2 BLDA-SCNC: 19.1 MMOL/L
CO2 SERPL-SCNC: 13 MMOL/L (ref 22–29)
CO2 SERPL-SCNC: 18 MMOL/L (ref 22–29)
CO2 SERPL-SCNC: 19 MMOL/L (ref 22–29)
COHGB MFR BLDA: 0.8 % (ref 0.5–1.5)
CREAT SERPL-MCNC: 1.15 MG/DL (ref 0.72–1.25)
CREAT SERPL-MCNC: 1.4 MG/DL (ref 0.72–1.25)
CREAT SERPL-MCNC: 1.53 MG/DL (ref 0.72–1.25)
CREAT/UREA NIT SERPL: 31
CREAT/UREA NIT SERPL: 34
CREAT/UREA NIT SERPL: 36
DRAWN BY BLOOD GAS (OHS): ABNORMAL
DRAWN BY BLOOD GAS (OHS): ABNORMAL
EOSINOPHIL # BLD AUTO: 0.01 X10(3)/MCL (ref 0–0.9)
EOSINOPHIL # BLD AUTO: 0.21 X10(3)/MCL (ref 0–0.9)
EOSINOPHIL NFR BLD AUTO: 0.2 %
EOSINOPHIL NFR BLD AUTO: 3.3 %
ERYTHROCYTE [DISTWIDTH] IN BLOOD BY AUTOMATED COUNT: 14.9 % (ref 11.5–17)
ERYTHROCYTE [DISTWIDTH] IN BLOOD BY AUTOMATED COUNT: 15.3 % (ref 11.5–17)
GFR SERPLBLD CREATININE-BSD FMLA CKD-EPI: 56 ML/MIN/1.73/M2
GFR SERPLBLD CREATININE-BSD FMLA CKD-EPI: >60 ML/MIN/1.73/M2
GFR SERPLBLD CREATININE-BSD FMLA CKD-EPI: >60 ML/MIN/1.73/M2
GLOBULIN SER-MCNC: 3.9 GM/DL (ref 2.4–3.5)
GLOBULIN SER-MCNC: 4.3 GM/DL (ref 2.4–3.5)
GLUCOSE SERPL-MCNC: 118 MG/DL (ref 74–100)
GLUCOSE SERPL-MCNC: 175 MG/DL (ref 74–100)
GLUCOSE SERPL-MCNC: 229 MG/DL (ref 74–100)
HCO3 BLDA-SCNC: 17.6 MMOL/L (ref 22–26)
HCO3 BLDA-SCNC: 18.2 MMOL/L (ref 22–26)
HCT VFR BLD AUTO: 36.4 % (ref 42–52)
HCT VFR BLD AUTO: 37.3 % (ref 42–52)
HGB BLD-MCNC: 11.9 G/DL (ref 14–18)
HGB BLD-MCNC: 12 G/DL (ref 14–18)
IMM GRANULOCYTES # BLD AUTO: 0.02 X10(3)/MCL (ref 0–0.04)
IMM GRANULOCYTES # BLD AUTO: 0.02 X10(3)/MCL (ref 0–0.04)
IMM GRANULOCYTES NFR BLD AUTO: 0.3 %
IMM GRANULOCYTES NFR BLD AUTO: 0.3 %
INHALED O2 CONCENTRATION: 100 %
LACTATE SERPL-SCNC: 1.5 MMOL/L (ref 0.5–2.2)
LPM (OHS): 3
LPM (OHS): 40
LYMPHOCYTES # BLD AUTO: 0.38 X10(3)/MCL (ref 0.6–4.6)
LYMPHOCYTES # BLD AUTO: 0.96 X10(3)/MCL (ref 0.6–4.6)
LYMPHOCYTES NFR BLD AUTO: 15.2 %
LYMPHOCYTES NFR BLD AUTO: 6.1 %
MAGNESIUM SERPL-MCNC: 1.9 MG/DL (ref 1.6–2.6)
MCH RBC QN AUTO: 32.7 PG (ref 27–31)
MCH RBC QN AUTO: 32.9 PG (ref 27–31)
MCHC RBC AUTO-ENTMCNC: 31.9 G/DL (ref 33–36)
MCHC RBC AUTO-ENTMCNC: 33 G/DL (ref 33–36)
MCV RBC AUTO: 102.5 FL (ref 80–94)
MCV RBC AUTO: 99.7 FL (ref 80–94)
METHGB MFR BLDA: 1.1 % (ref 0.4–1.5)
MONOCYTES # BLD AUTO: 0.32 X10(3)/MCL (ref 0.1–1.3)
MONOCYTES # BLD AUTO: 0.34 X10(3)/MCL (ref 0.1–1.3)
MONOCYTES NFR BLD AUTO: 5.1 %
MONOCYTES NFR BLD AUTO: 5.4 %
NEUTROPHILS # BLD AUTO: 4.77 X10(3)/MCL (ref 2.1–9.2)
NEUTROPHILS # BLD AUTO: 5.48 X10(3)/MCL (ref 2.1–9.2)
NEUTROPHILS NFR BLD AUTO: 75.3 %
NEUTROPHILS NFR BLD AUTO: 88 %
NRBC BLD AUTO-RTO: 0 %
NRBC BLD AUTO-RTO: 0 %
O2 HB BLOOD GAS (OHS): 96.9 % (ref 94–97)
OHS QRS DURATION: 170 MS
OHS QRS DURATION: 196 MS
OHS QTC CALCULATION: 490 MS
OHS QTC CALCULATION: 506 MS
OXYGEN DEVICE BLOOD GAS (OHS): ABNORMAL
OXYGEN DEVICE BLOOD GAS (OHS): ABNORMAL
OXYHGB MFR BLDA: 10.7 G/DL (ref 12–16)
PCO2 BLDA: 29 MMHG (ref 35–45)
PCO2 BLDA: 30 MMHG (ref 35–45)
PH BLDA: 7.39 [PH] (ref 7.35–7.45)
PH BLDA: 7.39 [PH] (ref 7.35–7.45)
PHOSPHATE SERPL-MCNC: 3.3 MG/DL (ref 2.3–4.7)
PLATELET # BLD AUTO: 120 X10(3)/MCL (ref 130–400)
PLATELET # BLD AUTO: 121 X10(3)/MCL (ref 130–400)
PMV BLD AUTO: 9 FL (ref 7.4–10.4)
PMV BLD AUTO: 9.6 FL (ref 7.4–10.4)
PO2 BLDA: 189 MMHG (ref 80–100)
PO2 BLDA: 56 MMHG (ref 80–100)
POCT GLUCOSE: 121 MG/DL (ref 70–110)
POCT GLUCOSE: 130 MG/DL (ref 70–110)
POCT GLUCOSE: 157 MG/DL (ref 70–110)
POCT GLUCOSE: 250 MG/DL (ref 70–110)
POTASSIUM BLOOD GAS (OHS): 4 MMOL/L (ref 3.5–5)
POTASSIUM BLOOD GAS (OHS): 4 MMOL/L (ref 3.5–5)
POTASSIUM SERPL-SCNC: 4 MMOL/L (ref 3.5–5.1)
POTASSIUM SERPL-SCNC: 4.1 MMOL/L (ref 3.5–5.1)
POTASSIUM SERPL-SCNC: 4.4 MMOL/L (ref 3.5–5.1)
PROT SERPL-MCNC: 6 GM/DL (ref 6.4–8.3)
PROT SERPL-MCNC: 6.1 GM/DL (ref 6.4–8.3)
RBC # BLD AUTO: 3.64 X10(6)/MCL (ref 4.7–6.1)
RBC # BLD AUTO: 3.65 X10(6)/MCL (ref 4.7–6.1)
SAMPLE SITE BLOOD GAS (OHS): ABNORMAL
SAMPLE SITE BLOOD GAS (OHS): ABNORMAL
SAO2 % BLDA: 88 %
SAO2 % BLDA: 99.6 %
SODIUM BLOOD GAS (OHS): 136 MMOL/L (ref 137–145)
SODIUM BLOOD GAS (OHS): 140 MMOL/L (ref 137–145)
SODIUM SERPL-SCNC: 139 MMOL/L (ref 136–145)
SODIUM SERPL-SCNC: 141 MMOL/L (ref 136–145)
SODIUM SERPL-SCNC: 142 MMOL/L (ref 136–145)
WBC # BLD AUTO: 6.23 X10(3)/MCL (ref 4.5–11.5)
WBC # BLD AUTO: 6.33 X10(3)/MCL (ref 4.5–11.5)

## 2025-04-18 PROCEDURE — 93005 ELECTROCARDIOGRAM TRACING: CPT

## 2025-04-18 PROCEDURE — 25000003 PHARM REV CODE 250: Performed by: INTERNAL MEDICINE

## 2025-04-18 PROCEDURE — 20000000 HC ICU ROOM

## 2025-04-18 PROCEDURE — 25000003 PHARM REV CODE 250

## 2025-04-18 PROCEDURE — 63600175 PHARM REV CODE 636 W HCPCS

## 2025-04-18 PROCEDURE — 85025 COMPLETE CBC W/AUTO DIFF WBC: CPT | Performed by: NURSE PRACTITIONER

## 2025-04-18 PROCEDURE — 99900026 HC AIRWAY MAINTENANCE (STAT)

## 2025-04-18 PROCEDURE — 27100171 HC OXYGEN HIGH FLOW UP TO 24 HOURS

## 2025-04-18 PROCEDURE — 83735 ASSAY OF MAGNESIUM: CPT | Performed by: INTERNAL MEDICINE

## 2025-04-18 PROCEDURE — 94760 N-INVAS EAR/PLS OXIMETRY 1: CPT

## 2025-04-18 PROCEDURE — 25000242 PHARM REV CODE 250 ALT 637 W/ HCPCS: Performed by: INTERNAL MEDICINE

## 2025-04-18 PROCEDURE — 94799 UNLISTED PULMONARY SVC/PX: CPT

## 2025-04-18 PROCEDURE — 36415 COLL VENOUS BLD VENIPUNCTURE: CPT | Performed by: NURSE PRACTITIONER

## 2025-04-18 PROCEDURE — 87040 BLOOD CULTURE FOR BACTERIA: CPT | Performed by: INTERNAL MEDICINE

## 2025-04-18 PROCEDURE — 99900031 HC PATIENT EDUCATION (STAT)

## 2025-04-18 PROCEDURE — 87070 CULTURE OTHR SPECIMN AEROBIC: CPT | Performed by: INTERNAL MEDICINE

## 2025-04-18 PROCEDURE — 27100092 HC HIGH FLOW DELIVERY CANNULA

## 2025-04-18 PROCEDURE — 93010 ELECTROCARDIOGRAM REPORT: CPT | Mod: ,,, | Performed by: INTERNAL MEDICINE

## 2025-04-18 PROCEDURE — 99900017 HC EXTUBATION W/PARAMETERS (STAT)

## 2025-04-18 PROCEDURE — 36415 COLL VENOUS BLD VENIPUNCTURE: CPT | Performed by: INTERNAL MEDICINE

## 2025-04-18 PROCEDURE — 25500020 PHARM REV CODE 255: Performed by: INTERNAL MEDICINE

## 2025-04-18 PROCEDURE — 85025 COMPLETE CBC W/AUTO DIFF WBC: CPT | Performed by: INTERNAL MEDICINE

## 2025-04-18 PROCEDURE — 83605 ASSAY OF LACTIC ACID: CPT | Performed by: INTERNAL MEDICINE

## 2025-04-18 PROCEDURE — 63600175 PHARM REV CODE 636 W HCPCS: Performed by: INTERNAL MEDICINE

## 2025-04-18 PROCEDURE — 93010 ELECTROCARDIOGRAM REPORT: CPT | Mod: ,,, | Performed by: STUDENT IN AN ORGANIZED HEALTH CARE EDUCATION/TRAINING PROGRAM

## 2025-04-18 PROCEDURE — 36600 WITHDRAWAL OF ARTERIAL BLOOD: CPT

## 2025-04-18 PROCEDURE — 80053 COMPREHEN METABOLIC PANEL: CPT | Performed by: INTERNAL MEDICINE

## 2025-04-18 PROCEDURE — 94003 VENT MGMT INPAT SUBQ DAY: CPT

## 2025-04-18 PROCEDURE — 25000003 PHARM REV CODE 250: Performed by: STUDENT IN AN ORGANIZED HEALTH CARE EDUCATION/TRAINING PROGRAM

## 2025-04-18 PROCEDURE — 94640 AIRWAY INHALATION TREATMENT: CPT

## 2025-04-18 PROCEDURE — 82803 BLOOD GASES ANY COMBINATION: CPT

## 2025-04-18 PROCEDURE — 99900035 HC TECH TIME PER 15 MIN (STAT)

## 2025-04-18 PROCEDURE — 97162 PT EVAL MOD COMPLEX 30 MIN: CPT

## 2025-04-18 PROCEDURE — 84100 ASSAY OF PHOSPHORUS: CPT | Performed by: INTERNAL MEDICINE

## 2025-04-18 PROCEDURE — 94761 N-INVAS EAR/PLS OXIMETRY MLT: CPT

## 2025-04-18 PROCEDURE — 27000249 HC VAPOTHERM CIRCUIT

## 2025-04-18 RX ORDER — ACETAMINOPHEN 650 MG/20.3ML
650 LIQUID ORAL EVERY 4 HOURS PRN
Status: DISCONTINUED | OUTPATIENT
Start: 2025-04-18 | End: 2025-04-23 | Stop reason: HOSPADM

## 2025-04-18 RX ORDER — IPRATROPIUM BROMIDE AND ALBUTEROL SULFATE 2.5; .5 MG/3ML; MG/3ML
3 SOLUTION RESPIRATORY (INHALATION) ONCE
Status: COMPLETED | OUTPATIENT
Start: 2025-04-18 | End: 2025-04-18

## 2025-04-18 RX ORDER — SODIUM CHLORIDE FOR INHALATION 0.9 %
3 VIAL, NEBULIZER (ML) INHALATION
Status: DISCONTINUED | OUTPATIENT
Start: 2025-04-18 | End: 2025-04-23 | Stop reason: HOSPADM

## 2025-04-18 RX ORDER — ACETAMINOPHEN 650 MG/20.3ML
650 LIQUID ORAL ONCE
Status: COMPLETED | OUTPATIENT
Start: 2025-04-18 | End: 2025-04-18

## 2025-04-18 RX ADMIN — ACETAMINOPHEN 650 MG: 650 SOLUTION ORAL at 06:04

## 2025-04-18 RX ADMIN — MUPIROCIN: 20 OINTMENT TOPICAL at 08:04

## 2025-04-18 RX ADMIN — DEXMEDETOMIDINE HYDROCHLORIDE 0.6 MCG/KG/HR: 400 INJECTION INTRAVENOUS at 02:04

## 2025-04-18 RX ADMIN — PROPOFOL 15 MCG/KG/MIN: 10 INJECTION, EMULSION INTRAVENOUS at 06:04

## 2025-04-18 RX ADMIN — PIPERACILLIN SODIUM AND TAZOBACTAM SODIUM 4.5 G: 4; .5 INJECTION, POWDER, LYOPHILIZED, FOR SOLUTION INTRAVENOUS at 05:04

## 2025-04-18 RX ADMIN — VANCOMYCIN HYDROCHLORIDE 1000 MG: 1 INJECTION, POWDER, LYOPHILIZED, FOR SOLUTION INTRAVENOUS at 08:04

## 2025-04-18 RX ADMIN — SODIUM CHLORIDE, POTASSIUM CHLORIDE, SODIUM LACTATE AND CALCIUM CHLORIDE: 600; 310; 30; 20 INJECTION, SOLUTION INTRAVENOUS at 02:04

## 2025-04-18 RX ADMIN — SODIUM CHLORIDE, POTASSIUM CHLORIDE, SODIUM LACTATE AND CALCIUM CHLORIDE 1000 ML: 600; 310; 30; 20 INJECTION, SOLUTION INTRAVENOUS at 05:04

## 2025-04-18 RX ADMIN — IOHEXOL 100 ML: 350 INJECTION, SOLUTION INTRAVENOUS at 07:04

## 2025-04-18 RX ADMIN — IPRATROPIUM BROMIDE AND ALBUTEROL SULFATE 3 ML: .5; 3 SOLUTION RESPIRATORY (INHALATION) at 01:04

## 2025-04-18 RX ADMIN — ACETAMINOPHEN 650 MG: 650 SOLUTION ORAL at 01:04

## 2025-04-18 RX ADMIN — VANCOMYCIN HYDROCHLORIDE 1000 MG: 1 INJECTION, POWDER, LYOPHILIZED, FOR SOLUTION INTRAVENOUS at 06:04

## 2025-04-18 RX ADMIN — ASPIRIN 81 MG CHEWABLE TABLET 81 MG: 81 TABLET CHEWABLE at 08:04

## 2025-04-18 RX ADMIN — ARIPIPRAZOLE 5 MG: 5 TABLET ORAL at 08:04

## 2025-04-18 RX ADMIN — HYDRALAZINE HYDROCHLORIDE 10 MG: 20 INJECTION INTRAMUSCULAR; INTRAVENOUS at 03:04

## 2025-04-18 NOTE — PROGRESS NOTES
Pharmacokinetic Initial Assessment: IV Vancomycin    Assessment/Plan:  Initiate intravenous vancomycin with loading dose of 2000 mg once followed by a maintenance dose of vancomycin 1000 mg IV every 12 hours  Desired empiric serum trough concentration is 15 to 20 mcg/mL  Draw vancomycin trough level 60 min prior to third dose on 04/19 at approximately 1700  Pharmacy will continue to follow and monitor vancomycin.      Please contact pharmacy at extension [phone] with any questions regarding this assessment.     Thank you for the consult,   Lauren Freedman       Patient brief summary:  Bacilio Arguelles is a 49 y.o. male initiated on antimicrobial therapy with IV Vancomycin for treatment of suspected sepsis    Drug Allergies:   Review of patient's allergies indicates:  No Known Allergies    Actual Body Weight:   Wt Readings from Last 1 Encounters:   04/18/25 84.3 kg (185 lb 13.6 oz)       Renal Function:   Estimated Creatinine Clearance: 63.8 mL/min (A) (based on SCr of 1.4 mg/dL (H)).,     Dialysis Method (if applicable):  N/A    CBC (last 72 hours):  Recent Labs   Lab Result Units 04/16/25 0317 04/17/25  0304 04/18/25  0310 04/18/25  1509   WBC x10(3)/mcL 10.97 8.65 6.33 6.23   Hgb g/dL 13.1* 10.9* 12.0* 11.9*   Hct % 41.5* 33.0* 36.4* 37.3*   Platelet x10(3)/mcL 109* 103* 121* 120*   Mono % % 6.5 4.7 5.4 5.1   Eos % % 0.1 1.2 3.3 0.2   Basophil % % 0.3 0.2 0.5 0.3       Metabolic Panel (last 72 hours):  Recent Labs   Lab Result Units 04/16/25  0317 04/17/25  0304 04/18/25  0318 04/18/25  1509   Sodium mmol/L 142 140 142 141   Potassium mmol/L 4.3 3.8 4.0 4.4   Chloride mmol/L 110* 110* 113* 110*   CO2 mmol/L 18* 22 19* 13*   Glucose mg/dL 157* 117* 118* 175*   Blood Urea Nitrogen mg/dL 49.6* 43.3* 41.6* 48.1*   Creatinine mg/dL 2.05* 1.58* 1.15 1.40*   Albumin g/dL 2.2* 2.0* 1.8* 2.1*   Bilirubin Total mg/dL 1.0 0.9 0.9 1.2   ALP unit/L 79 79 77 90   AST unit/L 46* 41 45 65*   ALT unit/L 20 20 21 27   Magnesium  "Level mg/dL 3.00* 2.10 1.90  --    Phosphorus Level mg/dL 3.4 2.4 3.3  --        Drug levels (last 3 results):  No results for input(s): "VANCOMYCINRA", "VANCORANDOM", "VANCOMYCINPE", "VANCOPEAK", "VANCOMYCINTR", "VANCOTROUGH" in the last 72 hours.    Microbiologic Results:  Microbiology Results (last 7 days)       Procedure Component Value Units Date/Time    Blood Culture [[id number]] Collected: 04/18/25 1645    Order Status: Sent Specimen: Blood     Blood Culture [[id number]] Collected: 04/18/25 1645    Order Status: Sent Specimen: Blood     Respiratory Culture [[id number]]     Order Status: Sent Specimen: Sputum     Blood Culture [[id number]]  (Normal) Collected: 04/15/25 1637    Order Status: Completed Specimen: Blood Updated: 04/17/25 1801     Blood Culture No Growth At 48 Hours    Blood Culture [[id number]]  (Normal) Collected: 04/15/25 1637    Order Status: Completed Specimen: Blood Updated: 04/17/25 1801     Blood Culture No Growth At 48 Hours    Urine culture [[id number]] Collected: 04/15/25 1500    Order Status: Completed Specimen: Urine Updated: 04/17/25 0824     Urine Culture No Growth           "

## 2025-04-18 NOTE — PLAN OF CARE
Problem: Adult Inpatient Plan of Care  Goal: Plan of Care Review  Outcome: Progressing  Flowsheets (Taken 4/17/2025 1909)  Plan of Care Reviewed With: patient  Goal: Patient-Specific Goal (Individualized)  Outcome: Progressing  Flowsheets (Taken 4/17/2025 1909)  Individualized Care Needs: keep HOB flat until 2000, remove sutures in right groin in AM, martinez care, turn Q2, glucose check Q6  Anxieties, Fears or Concerns: na  Goal: Absence of Hospital-Acquired Illness or Injury  Outcome: Progressing  Intervention: Identify and Manage Fall Risk  Flowsheets (Taken 4/17/2025 1909)  Safety Promotion/Fall Prevention:   assistive device/personal item within reach   Fall Risk reviewed with patient/family   Fall Risk signage in place   medications reviewed   lighting adjusted   side rails raised x 3   pulse ox   /camera at bedside   room near unit station  Intervention: Prevent Skin Injury  Flowsheets (Taken 4/17/2025 1909)  Body Position:   turned   30 degrees   supine   weight shifting  Skin Protection: drying agents applied  Device Skin Pressure Protection: absorbent pad utilized/changed  Intervention: Prevent and Manage VTE (Venous Thromboembolism) Risk  Flowsheets (Taken 4/17/2025 1909)  VTE Prevention/Management:   remove, assess skin, and reapply sequential compression device   ambulation promoted   bleeding precautions maintained   bleeding risk assessed   bleeding risk factor(s) identified, provider notified   ROM (active) performed   ROM (passive) performed   dorsiflexion/plantar flexion performed  Intervention: Prevent Infection  Flowsheets (Taken 4/17/2025 1909)  Infection Prevention:   environmental surveillance performed   equipment surfaces disinfected   hand hygiene promoted   personal protective equipment utilized   rest/sleep promoted   single patient room provided  Goal: Optimal Comfort and Wellbeing  Outcome: Progressing  Intervention: Monitor Pain and Promote Comfort  Flowsheets (Taken  4/17/2025 1909)  Pain Management Interventions:   care clustered   relaxation techniques promoted   quiet environment facilitated  Intervention: Provide Person-Centered Care  Flowsheets (Taken 4/17/2025 1909)  Trust Relationship/Rapport:   care explained   choices provided   emotional support provided   empathic listening provided   questions answered   questions encouraged   reassurance provided   thoughts/feelings acknowledged  Goal: Readiness for Transition of Care  Outcome: Progressing     Problem: Delirium  Goal: Optimal Coping  Outcome: Progressing  Intervention: Optimize Psychosocial Adjustment to Delirium  Flowsheets (Taken 4/17/2025 1909)  Supportive Measures: active listening utilized  Family/Support System Care: caregiver stress acknowledged  Goal: Improved Behavioral Control  Outcome: Progressing  Intervention: Prevent and Manage Agitation  Flowsheets (Taken 4/17/2025 1909)  Environment Familiarity/Consistency: daily routine followed  Intervention: Minimize Safety Risk  Flowsheets (Taken 4/17/2025 1909)  Communication Enhancement Strategies: call light answered in person  Trust Relationship/Rapport:   care explained   choices provided   emotional support provided   empathic listening provided   questions answered   questions encouraged   reassurance provided   thoughts/feelings acknowledged  Enhanced Safety Measures:   room near unit station   monitored by video  Goal: Improved Attention and Thought Clarity  Outcome: Progressing  Intervention: Maximize Cognitive Function  Flowsheets (Taken 4/17/2025 1909)  Reorientation Measures:   calendar in view   clock in view  Sensory Stimulation Regulation:   auditory stimulation minimized   tactile stimulation minimized  Goal: Improved Sleep  Outcome: Progressing  Intervention: Promote Sleep  Flowsheets (Taken 4/17/2025 1909)  Sleep/Rest Enhancement:   awakenings minimized   consistent schedule promoted   room darkened   relaxation techniques promoted   regular  sleep/rest pattern promoted   noise level reduced     Problem: Skin Injury Risk Increased  Goal: Skin Health and Integrity  Outcome: Progressing  Intervention: Optimize Skin Protection  Flowsheets (Taken 4/17/2025 1909)  Pressure Reduction Techniques:   frequent weight shift encouraged   weight shift assistance provided   heels elevated off bed   positioned off wounds   pressure points protected  Pressure Reduction Devices: alternating pressure pump (KATIE)  Skin Protection: drying agents applied  Activity Management:   Rolling - L1   Arm raise - L1   Heel slide - L1  Head of Bed (HOB) Positioning:   HOB flat   HOB not elevated due to medical condition  Intervention: Promote and Optimize Oral Intake  Flowsheets (Taken 4/17/2025 1909)  Oral Nutrition Promotion: rest periods promoted  Nutrition Interventions: referred to dietitian     Problem: Infection  Goal: Absence of Infection Signs and Symptoms  Outcome: Progressing  Intervention: Prevent or Manage Infection  Flowsheets (Taken 4/17/2025 1909)  Fever Reduction/Comfort Measures:   lightweight bedding   lightweight clothing  Infection Management: aseptic technique maintained  Isolation Precautions: precautions maintained

## 2025-04-18 NOTE — PT/OT/SLP EVAL
Physical Therapy Evaluation    Patient Name:  Bacilio Arguelles   MRN:  [id number]    Recommendations:     Discharge therapy intensity: Moderate Intensity Therapy   Discharge Equipment Recommendations: to be determined by next level of care   Barriers to discharge:  medical dx, impaired mobility, decreased independence     Assessment:     Bacilio Arguelles is a 49 y.o. male admitted with a medical diagnosis of cardiac arrest; syncope due to high grade AV block s/p TVP and now s/p leadless PPM.  Of note, pt with PMH of seizure, psychological disorder and intellectual disability.    He presents with the following impairments/functional limitations: weakness, gait instability, impaired endurance, impaired balance, impaired self care skills, impaired functional mobility.    Pt tolerates PT eval fairly. Pt verbalizes concerns of being fearful to mobilize and of dying; comfort and encouragement provided. Quite anxious throughout. Requires mod-maxA for bed mobility and stands with Elyse x2; unable to get pt to take steps at this time. States that he was at group home prior and independent with mobility; additionally, unsure of what level of assistance he can receive from there. Appears as if he may need some type of placement beyond this stay in order to maximize functional mobility and overall independence. However, pt constantly asking when he can go home, so will see how he progresses in the coming sessions.     Rehab Prognosis: Good; patient would benefit from acute skilled PT services to address these deficits and reach maximum level of function.    Recent Surgery: Procedure(s) (LRB):  [id number] (N/A) 1 Day Post-Op    Plan:     During this hospitalization, patient would benefit from acute PT services 5 x/week to address the identified rehab impairments via gait training, therapeutic activities, therapeutic exercises and progress toward the following goals:    Plan of Care Expires:   05/18/25    Subjective     Chief Complaint: n/a  Patient/Family Comments/goals:  to go home   Pain/Comfort:  Pain Rating 1: 0/10    Patients cultural, spiritual, Christianity conflicts given the current situation: no    Living Environment:  Pt lives in a group home  Prior to admission, patients level of function was independent.    Equipment used at home: none.  DME owned (not currently used): none.    Upon discharge, patient will have assistance from unsure.    Objective:     Communicated with NSG prior to session.  Patient found HOB elevated with blood pressure cuff, pulse ox (continuous), telemetry, peripheral IV, martinez catheter  upon PT entry to room.    General Precautions: Standard, fall  Orthopedic Precautions:N/A   Braces: N/A  Respiratory Status: Nasal cannula, flow 3 L/min  Blood Pressure: 142/49  SpO2: 94%  RR: 20- 40  HR: 112-124; constantly being paced on monitor- RN present throughout       Exams:  Cognitive Exam:  Patient is oriented to Person  RLE Strength: WFL  LLE Strength: WFL  Skin integrity: Visible skin intact      Functional Mobility:  Bed Mobility:     Supine to Sit: moderate assistance  Sit to Supine: maximal assistance  Transfers:     Sit to Stand:  minimum assistance and of 2 persons with hand-held assist  Upon standing, pt with a posterior lean; unable to correct despite cues  Balance: CGA-Elyse for static sitting   Upon sitting EOB, pt with increased work of breathing and wheezing.  Pt requires increased verbal cues for pursed lip breathing, plus additional comfort 2/2 anxiety. RN present throughout and aware.      AM-PAC 6 CLICK MOBILITY  Total Score:12       Treatment & Education:  Patient provided with verbal education  regarding PT role/goals/POC, fall prevention, safety awareness, and discharge/DME recommendations.  Understanding was verbalized, however additional teaching warranted.     Patient left with bed in chair position with all lines intact, call button in reach, RN notified,  and SCD donned .    GOALS:   Multidisciplinary Problems       Physical Therapy Goals          Problem: Physical Therapy    Goal Priority Disciplines Outcome Interventions   Physical Therapy Goal     PT, PT/OT Progressing    Description: Goals to be met by: 25     Patient will increase functional independence with mobility by performin. Supine to sit with Stand-by Assistance  2. Sit to supine with Stand-by Assistance  3. Sit to stand transfer with Stand-by Assistance  4. Gait  x 150 feet with Stand-by Assistance using Rolling Walker vs no AD.                          History:     Past Medical History:   Diagnosis Date    Anxiety disorder, unspecified     Depression     DM (diabetes mellitus), type 2     GERD (gastroesophageal reflux disease)     HLD (hyperlipidemia)     Hypothyroidism, unspecified     Intellectual disability     Mood disorder     Seizure disorder        Past Surgical History:   Procedure Laterality Date    ARTHROPLASTY,HIP,TOTAL, BILATERAL, POSTERIOR APPROACH Bilateral        Time Tracking:     PT Received On: 25  PT Start Time: 1138     PT Stop Time: 1202  PT Total Time (min): 24 min     Billable Minutes: Evaluation mod      2025

## 2025-04-18 NOTE — PLAN OF CARE
Problem: Adult Inpatient Plan of Care  Goal: Plan of Care Review  Outcome: Progressing     Problem: Delirium  Goal: Improved Attention and Thought Clarity  Outcome: Progressing  Goal: Improved Sleep  Outcome: Progressing

## 2025-04-18 NOTE — PROGRESS NOTES
[facility]    Cardiology  Progress Note    Patient Name: Bacilio Arguelles  MRN: [id number]  Admission Date: 4/15/2025  Hospital Length of Stay: 3 days  Code Status: Full Code   Attending Provider: Nj Becerra MD   Consulting Provider: ANTHONY Law  Primary Care Physician: Malik Zavala MD  Principal Problem:<principal problem not specified>    Patient information was obtained from past medical records and ER records.     Subjective:     Chief Complaint/Reason for Consult: Arrest/Bradycardia     HPI: Mr. Arguelles is a 48 y/o male who is unknown to [facility]. The patient presented to [facility] on 4.15.25 via Transfer from [facility] after sustaining a Cardiac Arrest with ROSC. Neurological Recovery was identified despite being Intubated/Ventilated for Airway Protection. He passed out and was unresponsive for about 2 minutes at the group home. He was found to have a HR in the 20s-30s, given Atropine and ACLS/CPR for 2 Minutes with ROSCH. He was note on any AV Luis A Blocking Agents/Rate Lowering Medications. He was noted to be in a High Grade AVB and underwent a TVP Placement and LHC with Non-Obstructive CAD. He did have an Elevated WBC Count and was Running a Temperature of > 101F. [facility] was consulted for Device Implant.     4.16.25: NAD. A.Febrile. WBC Normal. BC Pending. Vented/Sedated. Remains in Intermittent CHB/Back Up TVP. , BUN/Crea 49.6/2.05  4.17.25: NAD. Vented/Sedated. Remains A.Febrile. WBC Normal. BC Negative at 24 Hours. Intermittent CHB requiring TVP/R IJ. , BUN/Crea 43.3/1.58.  4.18.25: NAD. Vented/Sedated. S/P Dual Chamber Leadless PPM Implant.     PMH: Anxiety, Depression, DM II, GERD, HLD, Hypothyroidism, Intellectual Disability, Mood Disorder, Seizure Disorder  PSH: Arthroplasty of Bilateral Hips  Family History: Unable to Assess  Social History: Denies Illicit Drug, ETOH and Tobacco Use     Previous Cardiac Diagnostics:   ECHO 4.15.25:  Left Ventricle:  The left ventricle is normal in size. Mildly increased wall thickness. There is normal systolic function with a visually estimated ejection fraction of 60 - 65%.  Right Ventricle: The right ventricle is normal in size. Systolic function is normal.  Mitral Valve: Mildly calcified anterior leaflet. There is mild regurgitation.  IVC/SVC: Patient is ventilated, cannot use IVC diameter to estimate right atrial pressure.    ECHO 4.14.25:  LV Systolic Function is Significant Inferior Wall Motion Abnormality. Mildly Hypokinetic Anterior-Septal Wall  Estimated EF 30%  RV Function is Decreased  Trace AI  Mild MR  Trace PI  No Pericardial Effusion     LHC 4.14.25:  Separate Ostial for The LAD and LCX  LAD - Mild CAD  LCX - Mild CAD   RCA - Small, Non-Dominant Vessel with No Significant CAD  Non-Obstructive CAD  Elevated Troponin likely due to Asystolic Event  NICMO    Review of patient's allergies indicates:  No Known Allergies  No current facility-administered medications on file prior to encounter.     Current Outpatient Medications on File Prior to Encounter   Medication Sig    alfuzosin (UROXATRAL) 10 mg Tb24 Take 10 mg by mouth daily with dinner or evening meal.    ARIPiprazole (ABILIFY) 5 MG Tab Take 5 mg by mouth.    aspirin (ECOTRIN) 81 MG EC tablet Aspir-81 mg tablet,delayed release   Take 1 tablet every day by oral route.    atorvastatin (LIPITOR) 20 MG tablet atorvastatin 20 mg tablet    DAYVIGO 5 mg Tab Take 1 tablet by mouth.    divalproex (DEPAKOTE) 125 MG EC tablet Take 125 mg by mouth every evening.    fenofibrate (TRICOR) 145 MG tablet fenofibrate nanocrystallized 145 mg tablet    folic acid (FOLVITE) 1 MG tablet Take 1 tablet (1 mg total) by mouth once daily.    furosemide (LASIX) 40 MG tablet Take 0.5 tablets (20 mg total) by mouth daily as needed (Swelling, shortness of breath, rapid weight gain of 2 lb overnight).    glipiZIDE 5 MG TR24 Take 1 tablet (5 mg total) by mouth daily with breakfast.     levothyroxine (SYNTHROID) 25 MCG tablet levothyroxine 25 mcg tablet    paliperidone palmitate (INVEGA SUSTENNA) 117 mg/0.75 mL Syrg injection Invega Sustenna 117 mg/0.75 mL intramuscular syringe    pantoprazole (PROTONIX) 40 MG tablet Take 40 mg by mouth.    propranolol HCl (PROPRANOLOL ORAL) Take 5 mg by mouth 2 (two) times a day.    sodium bicarbonate 650 MG tablet Take 1 tablet (650 mg total) by mouth 3 (three) times daily. for 7 days    TRINTELLIX 20 mg Tab Take 1 tablet by mouth.    ursodioL (ACTIGALL) 300 mg capsule Take by mouth 3 (three) times daily.     Review of Systems   Unable to perform ROS: Intubated     Objective:     Vital Signs (Most Recent):  Temp: 98 °F (36.7 °C) (04/18/25 1200)  Pulse: (!) 119 (04/18/25 1200)  Resp: (!) 34 (04/18/25 1200)  BP: 110/72 (04/18/25 1200)  SpO2: (!) 93 % (04/18/25 1200) Vital Signs (24h Range):  Temp:  [97.6 °F (36.4 °C)-98.9 °F (37.2 °C)] 98 °F (36.7 °C)  Pulse:  [] 119  Resp:  [16-34] 34  SpO2:  [90 %-99 %] 93 %  BP: ()/() 110/72   Weight: 84.3 kg (185 lb 13.6 oz)  Body mass index is 31.9 kg/m².  SpO2: (!) 93 %       Intake/Output Summary (Last 24 hours) at 4/18/2025 1256  Last data filed at 4/18/2025 1020  Gross per 24 hour   Intake 2452.35 ml   Output 1990 ml   Net 462.35 ml     Lines/Drains/Airways       Drain  Duration                  Urethral Catheter 04/15/25 2000 2 days              Peripheral Intravenous Line  Duration                  Peripheral IV - Single Lumen 04/15/25 1200 20 G Anterior;Proximal;Right Forearm 3 days         Peripheral IV - Single Lumen 04/15/25 1200 20 G Distal;Posterior;Right Forearm 3 days         Peripheral IV - Single Lumen 04/18/25 0215 20 G 2 in Anterior;Right Upper Arm <1 day                  Significant Labs:   Chemistries:   Recent Labs   Lab 04/15/25  1317 04/16/25  0317 04/17/25  0304 04/18/25  0318    142 140 142   K 5.2* 4.3 3.8 4.0   * 110* 110* 113*   CO2 21* 18* 22 19*   BUN 48.4* 49.6*  "43.3* 41.6*   CREATININE 2.14* 2.05* 1.58* 1.15   CALCIUM 8.6 8.3* 8.2* 8.8   BILITOT 0.8 1.0 0.9 0.9   ALKPHOS 77 79 79 77   ALT 18 20 20 21   AST 55* 46* 41 45   GLUCOSE 153* 157* 117* 118*   MG 1.60 3.00* 2.10 1.90   PHOS 3.1 3.4 2.4 3.3        CBC/Anemia Labs: Coags:    Recent Labs   Lab 04/16/25  0317 04/17/25  0304 04/18/25  0310   WBC 10.97 8.65 6.33   HGB 13.1* 10.9* 12.0*   HCT 41.5* 33.0* 36.4*   * 103* 121*   .6* 102.8* 99.7*   RDW 15.5 15.3 14.9    No results for input(s): "PT", "INR", "APTT" in the last 168 hours.     EKG:       Telemetry: VPaced    Physical Exam  Constitutional:       General: He is not in acute distress.     Appearance: Normal appearance.      Comments: Vented/Sedated   HENT:      Head: Normocephalic.      Mouth/Throat:      Mouth: Mucous membranes are moist.   Cardiovascular:      Rate and Rhythm: Normal rate and regular rhythm.      Pulses: Normal pulses.      Heart sounds: Normal heart sounds. No murmur heard.  Pulmonary:      Effort: Pulmonary effort is normal. No respiratory distress.      Comments: Ventilator Associated Breath Sounds  Vent Mode: A/C  Oxygen Concentration (%):  [30-40] 30  Resp Rate Total:  [18 br/min-19 br/min] 18 br/min  Vt Set:  [450 mL] 450 mL  PEEP/CPAP:  [5 cmH20] 5 cmH20  Mean Airway Pressure:  [12 deL39-42 cmH20] 12 cmH20  Abdominal:      Palpations: Abdomen is soft.   Skin:     General: Skin is warm.      Comments: R IJ Dressing C/D/I; R Groin Soft/Flat, Non-Tender, No Sign of Bleed/Infection. +2 BLE Palpable Pedal Pulses    Neurological:      Comments: Vented/Sedated       Home Medications:   Medications Ordered Prior to Encounter[1]  Current Schedule Inpatient Medications:   albuterol-ipratropium  3 mL Nebulization Once    ARIPiprazole  5 mg Per OG tube Daily    aspirin  81 mg Per OG tube Daily    mupirocin   Nasal BID     Continuous Infusions:   dexmedeTOMIDine (Precedex) infusion (titrating)  0-1.4 mcg/kg/hr Intravenous Continuous   " Stopped at 04/18/25 0913    lactated ringers   Intravenous Continuous 100 mL/hr at 04/18/25 1020 Rate Verify at 04/18/25 1020    propofoL  0-50 mcg/kg/min Intravenous Continuous   Stopped at 04/18/25 0858     Assessment:   Syncope due to High Grade AVB    - s/p Dual Chamber Leadless [employer] Device Implant (4.17.25)     - s/p TVP (4.14.25) - Removal(4.17.25)  Cardiopulmonary Arrest    - Given Atropine/ACLS x 2 Minutes with ROSC - Following Simple Commands on Ventilator  Acute Hypoxemic Respiratory Failure requiring Intubation/Ventilation  CARMEL - Resolved    Febrile - Resolved    - BC x 2 - Negative at 24 Hours   Electrolyte Derangements - Hyperkalemia - Resolved   NICMO/EF 30% - Resolved/Recovered EF     - ECHO (4.15.25) - LVEF 60-65%    - LHC (4.14.25) - Separate Ostial for The LAD and LCX; LAD - Mild CAD; LCX - Mild CAD; RCA - Small, Non-Dominant Vessel with No Significant CAD; Non-Obstructive CAD  NSTEMI Type II in the Setting of Cardiopulmonary Arrest  DM II  HTN  HLD  Anxiety/Depression  Intellectual Disability  GERD  Mood Disorder  Bipolar Disorder  Seizure Disorder  No Hx of GIB     Plan:   Vent per Primary Team  First Day Post Operative Device Check Unremarkable   Enroll in Pacer Clinic  F/U with Dr. Hernandez in 1 Week  We will be available as needed    Lavon Burt, ANP  Cardiology  [facility]        [1]   No current facility-administered medications on file prior to encounter.     Current Outpatient Medications on File Prior to Encounter   Medication Sig Dispense Refill    alfuzosin (UROXATRAL) 10 mg Tb24 Take 10 mg by mouth daily with dinner or evening meal.      ARIPiprazole (ABILIFY) 5 MG Tab Take 5 mg by mouth.      aspirin (ECOTRIN) 81 MG EC tablet Aspir-81 mg tablet,delayed release   Take 1 tablet every day by oral route.      atorvastatin (LIPITOR) 20 MG tablet atorvastatin 20 mg tablet      DAYVIGO 5 mg Tab Take 1 tablet by mouth.      divalproex (DEPAKOTE)  125 MG EC tablet Take 125 mg by mouth every evening.      fenofibrate (TRICOR) 145 MG tablet fenofibrate nanocrystallized 145 mg tablet      folic acid (FOLVITE) 1 MG tablet Take 1 tablet (1 mg total) by mouth once daily. 30 tablet 0    furosemide (LASIX) 40 MG tablet Take 0.5 tablets (20 mg total) by mouth daily as needed (Swelling, shortness of breath, rapid weight gain of 2 lb overnight).      glipiZIDE 5 MG TR24 Take 1 tablet (5 mg total) by mouth daily with breakfast. 30 tablet 0    levothyroxine (SYNTHROID) 25 MCG tablet levothyroxine 25 mcg tablet      paliperidone palmitate (INVEGA SUSTENNA) 117 mg/0.75 mL Syrg injection Invega Sustenna 117 mg/0.75 mL intramuscular syringe      pantoprazole (PROTONIX) 40 MG tablet Take 40 mg by mouth.      propranolol HCl (PROPRANOLOL ORAL) Take 5 mg by mouth 2 (two) times a day.      sodium bicarbonate 650 MG tablet Take 1 tablet (650 mg total) by mouth 3 (three) times daily. for 7 days 21 tablet 0    TRINTELLIX 20 mg Tab Take 1 tablet by mouth.      ursodioL (ACTIGALL) 300 mg capsule Take by mouth 3 (three) times daily.

## 2025-04-18 NOTE — PROGRESS NOTES
[facility] - 7th Floor ICU  Pulmonary Critical Care Note    Patient Name: Bacilio Arguelles  MRN: [id number]  Admission Date: 4/15/2025  Hospital Length of Stay: 3 days  Code Status: Full Code  Attending Provider: Nj Becerra MD  Primary Care Provider: Malik Zavala MD     Subjective:     HPI:   Fortunately old male with past medical history of non-insulin-dependent type 2 diabetes mellitus, hypothyroidism, seizure disorder, unspecified psychiatric disorder, intellectual disability transferred from [facility] for permanent pacemaker placement.  Patient passed out and was unresponsive for 2 minutes at group home, with heart rate ranging between 20s- 30s, given atropine, in the ER he was found to be in complete heart block, later went into asystole, ROSC achieved 2 minutes post CPR.  Immediately taken to cath lab and had a transvenous pacemaker placed and then admitted to ICU.  Per chart review, not on any AV germania blocking agents, no reversible causes identified, transferred to evaluate/need for permanent pacemaker placement.  No history is obtainable at this time as patient is intubated and sedated.      Past 24 hours:  Patient has remained intubated sedated and mechanically ventilated overnight.  This morning he is awake and alert.  Hemodynamically stable and paced.    Past Medical History:   Diagnosis Date    Anxiety disorder, unspecified     Depression     DM (diabetes mellitus), type 2     GERD (gastroesophageal reflux disease)     HLD (hyperlipidemia)     Hypothyroidism, unspecified     Intellectual disability     Mood disorder     Seizure disorder        Past Surgical History:   Procedure Laterality Date    ARTHROPLASTY,HIP,TOTAL, BILATERAL, POSTERIOR APPROACH Bilateral        Social History[1]        Current Outpatient Medications   Medication Instructions    alfuzosin (UROXATRAL) 10 mg, Oral, With dinner    ARIPiprazole (ABILIFY) 5 mg, Oral    aspirin (ECOTRIN) 81 MG  EC tablet Aspir-81 mg tablet,delayed release   Take 1 tablet every day by oral route.    atorvastatin (LIPITOR) 20 MG tablet atorvastatin 20 mg tablet    DAYVIGO 5 mg Tab 1 tablet, Oral    divalproex (DEPAKOTE) 125 mg, Oral, Nightly    fenofibrate (TRICOR) 145 MG tablet fenofibrate nanocrystallized 145 mg tablet    folic acid (FOLVITE) 1 mg, Oral, Daily    furosemide (LASIX) 20 mg, Oral, Daily PRN    glipiZIDE 5 mg, Oral, With breakfast    levothyroxine (SYNTHROID) 25 MCG tablet levothyroxine 25 mcg tablet    paliperidone palmitate (INVEGA SUSTENNA) 117 mg/0.75 mL Syrg injection Invega Sustenna 117 mg/0.75 mL intramuscular syringe    pantoprazole (PROTONIX) 40 mg, Oral    propranolol HCl (PROPRANOLOL ORAL) 5 mg, Oral, 2 times daily    sodium bicarbonate 650 mg, Oral, 3 times daily    TRINTELLIX 20 mg Tab 1 tablet, Oral    ursodioL (ACTIGALL) 300 mg capsule Oral, 3 times daily       Review of patient's allergies indicates:  No Known Allergies     Current Inpatient Medications   ARIPiprazole  5 mg Per OG tube Daily    aspirin  81 mg Per OG tube Daily    mupirocin   Nasal BID       Current Intravenous Infusions   dexmedeTOMIDine (Precedex) infusion (titrating)  0-1.4 mcg/kg/hr Intravenous Continuous   Stopped at 04/18/25 0913    lactated ringers   Intravenous Continuous 100 mL/hr at 04/18/25 1020 Rate Verify at 04/18/25 1020    propofoL  0-50 mcg/kg/min Intravenous Continuous   Stopped at 04/18/25 0858            Unable to do review of systems as patient is intubated and sedated.    Objective:       Intake/Output Summary (Last 24 hours) at 4/18/2025 1026  Last data filed at 4/18/2025 1020  Gross per 24 hour   Intake 2697.38 ml   Output 2115 ml   Net 582.38 ml         Vital Signs (Most Recent):  Temp: 97.8 °F (36.6 °C) (04/18/25 0800)  Pulse: 60 (04/18/25 0900)  Resp: 20 (04/18/25 0900)  BP: (!) 135/90 (04/18/25 0900)  SpO2: 98 % (04/18/25 0957)  Body mass index is 31.9 kg/m².  Weight: 84.3 kg (185 lb 13.6 oz) Vital  Signs (24h Range):  Temp:  [97.6 °F (36.4 °C)-98.9 °F (37.2 °C)] 97.8 °F (36.6 °C)  Pulse:  [59-61] 60  Resp:  [16-22] 20  SpO2:  [94 %-99 %] 98 %  BP: ()/() 135/90     Physical Exam  Vital signs and nursing notes reviewed.  Constitutional: NAD.  Intubated and sedated.  Head: Atraumatic. Normocephalic.  Eyes: Conjunctivae nl. No scleral icterus.  ENT: Mucous membranes are moist. Oropharynx is clear.  Neck: Supple. No lymphadenopathy.  Transvenous pacemaker in the right internal jugular  Cardiovascular: Regular rate and rhythm. No murmurs, rubs, or gallops. Distal pulses are 2+ and symmetric.  Pulmonary/Chest: No respiratory distress. Clear to auscultation bilaterally. No wheezing, rales, or rhonchi.  Abdominal: Soft. Non-distended. No TTP. No rebound, guarding, or rigidity.   Musculoskeletal:  Trace bilateral pedal edema.  Skin: Warm and dry.  Neurological:  Unable to do thorough neurological examination as patient is sedated.  Bilateral pupillary reflexes sluggish present.    Lines/Drains/Airways       Drain  Duration                  NG/OG Tube 04/15/25 1200 Center mouth 2 days         Urethral Catheter 04/15/25 2000 2 days              Peripheral Intravenous Line  Duration                  Peripheral IV - Single Lumen 04/15/25 1200 20 G Anterior;Proximal;Right Forearm 2 days         Peripheral IV - Single Lumen 04/15/25 1200 20 G Distal;Posterior;Right Forearm 2 days         Peripheral IV - Single Lumen 04/18/25 0215 20 G 2 in Anterior;Right Upper Arm <1 day                    Significant Labs:    Lab Results   Component Value Date    WBC 6.33 04/18/2025    HGB 12.0 (L) 04/18/2025    HCT 36.4 (L) 04/18/2025    MCV 99.7 (H) 04/18/2025     (L) 04/18/2025           BMP  Lab Results   Component Value Date     04/18/2025    K 4.0 04/18/2025    CO2 19 (L) 04/18/2025    BUN 41.6 (H) 04/18/2025    CREATININE 1.15 04/18/2025    CALCIUM 8.8 04/18/2025    AGAP 10.0 04/18/2025         ABG  Recent  Labs   Lab 04/15/25  1547   PH 7.330*   PO2 87.0   PCO2 37.0   HCO3 19.5*   POCBASEDEF -5.80*       Mechanical Ventilation Support:  Vent Mode: A/C (04/18/25 0745)  Ventilator Initiated: Yes (04/15/25 1125)  Set Rate: 18 BPM (04/18/25 0745)  Vt Set: 450 mL (04/18/25 0745)  Pressure Support: 10 cmH20 (04/16/25 0551)  PEEP/CPAP: 5 cmH20 (04/18/25 0745)  Oxygen Concentration (%): 30 (04/18/25 0900)  Peak Airway Pressure: 34 cmH20 (04/18/25 0745)  Total Ve: 7.1 L/m (04/18/25 0745)  F/VT Ratio<105 (RSBI): (!) 47.5 (04/18/25 0526)      Significant Imaging:          Assessment/Plan:     Assessment  In-hospital (IMC) cardiac arrest secondary to third-degree AV block, s/p transvenous pacemaker.  Acute respiratory failure secondary to third-degree AV block s/p intubation  CARMEL secondary to ATN  Non-insulin-dependent diabetes mellitus type 2      Plan  We will plan a rapid wean and extubation today.  Insulin sliding scale  Monitored briefly in the ICU but likely can be transferred out shortly.    DVT Prophylaxis:  SCD  GI Prophylaxis:  None     31 minutes of critical care was time spent personally by me on the following activities: development of treatment plan with patient or surrogate and bedside caregivers, discussions with consultants, evaluation of patient's response to treatment, examination of patient, ordering and performing treatments and interventions, ordering and review of laboratory studies, ordering and review of radiographic studies, pulse oximetry, re-evaluation of patient's condition.  This critical care time did not overlap with that of any other provider or involve time for any procedures.     Reynaldo Orellana MD  Pulmonary Critical Care Medicine  [facility] - 7th Floor ICU  DOS: 04/18/2025           [1]   Social History  Socioeconomic History    Marital status: Single   Tobacco Use    Smoking status: Never    Smokeless tobacco: Never   Substance and Sexual Activity    Alcohol use: Never    Drug  use: Never    Sexual activity: Not Currently     Social Drivers of Health     Financial Resource Strain: Patient Unable To Answer (4/18/2025)    Overall Financial Resource Strain (CARDIA)     Difficulty of Paying Living Expenses: Patient unable to answer   Food Insecurity: Patient Unable To Answer (4/18/2025)    Hunger Vital Sign     Worried About Running Out of Food in the Last Year: Patient unable to answer     Ran Out of Food in the Last Year: Patient unable to answer   Transportation Needs: Patient Unable To Answer (4/18/2025)    PRAPARE - Transportation     Lack of Transportation (Medical): Patient unable to answer     Lack of Transportation (Non-Medical): Patient unable to answer   Stress: Patient Unable To Answer (4/18/2025)    [employer] [place] [employer] - Occupational Stress Questionnaire     Feeling of Stress : Patient unable to answer   Housing Stability: Patient Unable To Answer (4/18/2025)    Housing Stability Vital Sign     Unable to Pay for Housing in the Last Year: Patient unable to answer     Homeless in the Last Year: Patient unable to answer

## 2025-04-18 NOTE — PLAN OF CARE
Problem: Physical Therapy  Goal: Physical Therapy Goal  Description: Goals to be met by: 25     Patient will increase functional independence with mobility by performin. Supine to sit with Stand-by Assistance  2. Sit to supine with Stand-by Assistance  3. Sit to stand transfer with Stand-by Assistance  4. Gait  x 150 feet with Stand-by Assistance using Rolling Walker vs no AD.     Outcome: Progressing

## 2025-04-19 LAB
ALBUMIN SERPL-MCNC: 1.7 G/DL (ref 3.5–5)
ALBUMIN/GLOB SERPL: 0.4 RATIO (ref 1.1–2)
ALP SERPL-CCNC: 89 UNIT/L (ref 40–150)
ALT SERPL-CCNC: 30 UNIT/L (ref 0–55)
ANION GAP SERPL CALC-SCNC: 8 MEQ/L
AST SERPL-CCNC: 61 UNIT/L (ref 11–45)
BACTERIA #/AREA URNS AUTO: ABNORMAL /HPF
BILIRUB SERPL-MCNC: 1.9 MG/DL
BILIRUB UR QL STRIP.AUTO: NEGATIVE
BUN SERPL-MCNC: 39.7 MG/DL (ref 8.9–20.6)
CALCIUM SERPL-MCNC: 8.7 MG/DL (ref 8.4–10.2)
CHLORIDE SERPL-SCNC: 110 MMOL/L (ref 98–107)
CLARITY UR: CLEAR
CO2 SERPL-SCNC: 20 MMOL/L (ref 22–29)
COLOR UR AUTO: YELLOW
CREAT SERPL-MCNC: 1.36 MG/DL (ref 0.72–1.25)
CREAT SERPL-MCNC: 1.49 MG/DL (ref 0.72–1.25)
CREAT/UREA NIT SERPL: 27
GFR SERPLBLD CREATININE-BSD FMLA CKD-EPI: 58 ML/MIN/1.73/M2
GFR SERPLBLD CREATININE-BSD FMLA CKD-EPI: >60 ML/MIN/1.73/M2
GLOBULIN SER-MCNC: 3.9 GM/DL (ref 2.4–3.5)
GLUCOSE SERPL-MCNC: 147 MG/DL (ref 74–100)
GLUCOSE UR QL STRIP: ABNORMAL
HGB UR QL STRIP: ABNORMAL
KETONES UR QL STRIP: ABNORMAL
LEUKOCYTE ESTERASE UR QL STRIP: NEGATIVE
MAGNESIUM SERPL-MCNC: 1.8 MG/DL (ref 1.6–2.6)
MUCOUS THREADS URNS QL MICRO: ABNORMAL /LPF
NITRITE UR QL STRIP: NEGATIVE
OHS QRS DURATION: 170 MS
OHS QRS DURATION: 190 MS
OHS QTC CALCULATION: 534 MS
OHS QTC CALCULATION: 558 MS
PH UR STRIP: 5 [PH]
PHOSPHATE SERPL-MCNC: 3.8 MG/DL (ref 2.3–4.7)
POCT GLUCOSE: 129 MG/DL (ref 70–110)
POCT GLUCOSE: 138 MG/DL (ref 70–110)
POCT GLUCOSE: 152 MG/DL (ref 70–110)
POTASSIUM SERPL-SCNC: 4 MMOL/L (ref 3.5–5.1)
PROT SERPL-MCNC: 5.6 GM/DL (ref 6.4–8.3)
PROT UR QL STRIP: ABNORMAL
RBC #/AREA URNS AUTO: ABNORMAL /HPF
SODIUM SERPL-SCNC: 138 MMOL/L (ref 136–145)
SP GR UR STRIP.AUTO: 1.03 (ref 1–1.03)
SQUAMOUS #/AREA URNS LPF: ABNORMAL /HPF
UROBILINOGEN UR STRIP-ACNC: 2
VANCOMYCIN TROUGH SERPL-MCNC: 28.3 UG/ML (ref 15–20)
WBC #/AREA URNS AUTO: ABNORMAL /HPF

## 2025-04-19 PROCEDURE — 97166 OT EVAL MOD COMPLEX 45 MIN: CPT

## 2025-04-19 PROCEDURE — 82565 ASSAY OF CREATININE: CPT | Performed by: INTERNAL MEDICINE

## 2025-04-19 PROCEDURE — 63600175 PHARM REV CODE 636 W HCPCS: Performed by: STUDENT IN AN ORGANIZED HEALTH CARE EDUCATION/TRAINING PROGRAM

## 2025-04-19 PROCEDURE — 99900031 HC PATIENT EDUCATION (STAT)

## 2025-04-19 PROCEDURE — 80053 COMPREHEN METABOLIC PANEL: CPT | Performed by: INTERNAL MEDICINE

## 2025-04-19 PROCEDURE — 84100 ASSAY OF PHOSPHORUS: CPT | Performed by: INTERNAL MEDICINE

## 2025-04-19 PROCEDURE — 94799 UNLISTED PULMONARY SVC/PX: CPT

## 2025-04-19 PROCEDURE — 94760 N-INVAS EAR/PLS OXIMETRY 1: CPT

## 2025-04-19 PROCEDURE — 25000003 PHARM REV CODE 250: Performed by: STUDENT IN AN ORGANIZED HEALTH CARE EDUCATION/TRAINING PROGRAM

## 2025-04-19 PROCEDURE — 36415 COLL VENOUS BLD VENIPUNCTURE: CPT | Performed by: INTERNAL MEDICINE

## 2025-04-19 PROCEDURE — 94640 AIRWAY INHALATION TREATMENT: CPT

## 2025-04-19 PROCEDURE — 63600175 PHARM REV CODE 636 W HCPCS

## 2025-04-19 PROCEDURE — 83735 ASSAY OF MAGNESIUM: CPT | Performed by: INTERNAL MEDICINE

## 2025-04-19 PROCEDURE — 25000003 PHARM REV CODE 250: Performed by: INTERNAL MEDICINE

## 2025-04-19 PROCEDURE — 80202 ASSAY OF VANCOMYCIN: CPT | Performed by: INTERNAL MEDICINE

## 2025-04-19 PROCEDURE — 63600175 PHARM REV CODE 636 W HCPCS: Performed by: HOSPITALIST

## 2025-04-19 PROCEDURE — 63600175 PHARM REV CODE 636 W HCPCS: Performed by: NURSE PRACTITIONER

## 2025-04-19 PROCEDURE — 92610 EVALUATE SWALLOWING FUNCTION: CPT

## 2025-04-19 PROCEDURE — 93005 ELECTROCARDIOGRAM TRACING: CPT

## 2025-04-19 PROCEDURE — 99900035 HC TECH TIME PER 15 MIN (STAT)

## 2025-04-19 PROCEDURE — 94761 N-INVAS EAR/PLS OXIMETRY MLT: CPT

## 2025-04-19 PROCEDURE — 63600175 PHARM REV CODE 636 W HCPCS: Performed by: INTERNAL MEDICINE

## 2025-04-19 PROCEDURE — 25000242 PHARM REV CODE 250 ALT 637 W/ HCPCS: Performed by: HOSPITALIST

## 2025-04-19 PROCEDURE — 93010 ELECTROCARDIOGRAM REPORT: CPT | Mod: ,,, | Performed by: STUDENT IN AN ORGANIZED HEALTH CARE EDUCATION/TRAINING PROGRAM

## 2025-04-19 PROCEDURE — 27100171 HC OXYGEN HIGH FLOW UP TO 24 HOURS

## 2025-04-19 PROCEDURE — 20000000 HC ICU ROOM

## 2025-04-19 PROCEDURE — 81001 URINALYSIS AUTO W/SCOPE: CPT | Performed by: INTERNAL MEDICINE

## 2025-04-19 RX ORDER — SODIUM CHLORIDE, SODIUM LACTATE, POTASSIUM CHLORIDE, CALCIUM CHLORIDE 600; 310; 30; 20 MG/100ML; MG/100ML; MG/100ML; MG/100ML
INJECTION, SOLUTION INTRAVENOUS CONTINUOUS
Status: ACTIVE | OUTPATIENT
Start: 2025-04-19 | End: 2025-04-20

## 2025-04-19 RX ORDER — MAGNESIUM SULFATE HEPTAHYDRATE 40 MG/ML
2 INJECTION, SOLUTION INTRAVENOUS ONCE
Status: COMPLETED | OUTPATIENT
Start: 2025-04-19 | End: 2025-04-19

## 2025-04-19 RX ORDER — ACETAMINOPHEN 10 MG/ML
1000 INJECTION, SOLUTION INTRAVENOUS ONCE
Status: COMPLETED | OUTPATIENT
Start: 2025-04-19 | End: 2025-04-19

## 2025-04-19 RX ORDER — IPRATROPIUM BROMIDE AND ALBUTEROL SULFATE 2.5; .5 MG/3ML; MG/3ML
3 SOLUTION RESPIRATORY (INHALATION) EVERY 6 HOURS
Status: DISCONTINUED | OUTPATIENT
Start: 2025-04-19 | End: 2025-04-23 | Stop reason: HOSPADM

## 2025-04-19 RX ADMIN — SODIUM CHLORIDE, POTASSIUM CHLORIDE, SODIUM LACTATE AND CALCIUM CHLORIDE: 600; 310; 30; 20 INJECTION, SOLUTION INTRAVENOUS at 10:04

## 2025-04-19 RX ADMIN — ACETAMINOPHEN 1000 MG: 10 INJECTION, SOLUTION INTRAVENOUS at 08:04

## 2025-04-19 RX ADMIN — PIPERACILLIN SODIUM AND TAZOBACTAM SODIUM 4.5 G: 4; .5 INJECTION, POWDER, LYOPHILIZED, FOR SOLUTION INTRAVENOUS at 12:04

## 2025-04-19 RX ADMIN — PIPERACILLIN SODIUM AND TAZOBACTAM SODIUM 4.5 G: 4; .5 INJECTION, POWDER, LYOPHILIZED, FOR SOLUTION INTRAVENOUS at 05:04

## 2025-04-19 RX ADMIN — IPRATROPIUM BROMIDE AND ALBUTEROL SULFATE 3 ML: 2.5; .5 SOLUTION RESPIRATORY (INHALATION) at 07:04

## 2025-04-19 RX ADMIN — MUPIROCIN: 20 OINTMENT TOPICAL at 08:04

## 2025-04-19 RX ADMIN — ASPIRIN 81 MG CHEWABLE TABLET 81 MG: 81 TABLET CHEWABLE at 08:04

## 2025-04-19 RX ADMIN — IPRATROPIUM BROMIDE AND ALBUTEROL SULFATE 3 ML: 2.5; .5 SOLUTION RESPIRATORY (INHALATION) at 02:04

## 2025-04-19 RX ADMIN — ARIPIPRAZOLE 5 MG: 5 TABLET ORAL at 08:04

## 2025-04-19 RX ADMIN — MAGNESIUM SULFATE HEPTAHYDRATE 2 G: 40 INJECTION, SOLUTION INTRAVENOUS at 08:04

## 2025-04-19 RX ADMIN — VANCOMYCIN HYDROCHLORIDE 1000 MG: 1 INJECTION, POWDER, LYOPHILIZED, FOR SOLUTION INTRAVENOUS at 06:04

## 2025-04-19 RX ADMIN — DEXMEDETOMIDINE HYDROCHLORIDE 0.2 MCG/KG/HR: 400 INJECTION INTRAVENOUS at 07:04

## 2025-04-19 RX ADMIN — PIPERACILLIN SODIUM AND TAZOBACTAM SODIUM 4.5 G: 4; .5 INJECTION, POWDER, LYOPHILIZED, FOR SOLUTION INTRAVENOUS at 08:04

## 2025-04-19 RX ADMIN — SODIUM CHLORIDE, POTASSIUM CHLORIDE, SODIUM LACTATE AND CALCIUM CHLORIDE: 600; 310; 30; 20 INJECTION, SOLUTION INTRAVENOUS at 05:04

## 2025-04-19 NOTE — PROGRESS NOTES
Pharmacokinetic Assessment Follow Up: IV Vancomycin    Vancomycin serum concentration assessment(s):    The trough level was drawn correctly and can be used to guide therapy at this time. The measurement is above the desired definitive target range of 15 to 20 mcg/mL.    Vancomycin Regimen Plan:    Discontinue the scheduled vancomycin regimen and re-dose when the random level is less than 20 mcg/mL, next level to be drawn at 0430 on 04/20..    Drug levels (last 3 results):  Recent Labs   Lab Result Units 04/19/25  1823   Vancomycin Trough ug/ml 28.3*       Pharmacy will continue to follow and monitor vancomycin.    Please contact pharmacy at extension [phone] for questions regarding this assessment.    Thank you for the consult,   Kalina Balderrama       Patient brief summary:  Bacilio Arguelles is a 49 y.o. male initiated on antimicrobial therapy with IV Vancomycin for treatment of sepsis    The patient's current regimen is 1000mg q12h.    Drug Allergies:   Review of patient's allergies indicates:  No Known Allergies    Actual Body Weight:   84.7kg    Renal Function:   Estimated Creatinine Clearance: 60.2 mL/min (A) (based on SCr of 1.49 mg/dL (H)).,     Dialysis Method (if applicable):  N/A    CBC (last 72 hours):  Recent Labs   Lab Result Units 04/17/25  0304 04/18/25  0310 04/18/25  1509   WBC x10(3)/mcL 8.65 6.33 6.23   Hgb g/dL 10.9* 12.0* 11.9*   Hct % 33.0* 36.4* 37.3*   Platelet x10(3)/mcL 103* 121* 120*   Mono % % 4.7 5.4 5.1   Eos % % 1.2 3.3 0.2   Basophil % % 0.2 0.5 0.3       Metabolic Panel (last 72 hours):  Recent Labs   Lab Result Units 04/17/25  0304 04/18/25  0318 04/18/25  1509 04/18/25  1714 04/18/25  1829 04/18/25  2130 04/19/25  0442 04/19/25  0817   Sodium mmol/L 140 142 141  --   --  139 138  --    Sodium, Blood Gas mmol/L  --   --   --  140 136*  --   --   --    Potassium mmol/L 3.8 4.0 4.4  --   --  4.1 4.0  --    Potassium, Blood Gas mmol/L  --   --   --  4.0 4.0  --   --   --    Chloride  mmol/L 110* 113* 110*  --   --  109* 110*  --    CO2 mmol/L 22 19* 13*  --   --  18* 20*  --    Glucose mg/dL 117* 118* 175*  --   --  229* 147*  --    Glucose, UA   --   --   --   --   --   --   --  4+*   Blood Urea Nitrogen mg/dL 43.3* 41.6* 48.1*  --   --  47.3* 39.7*  --    Creatinine mg/dL 1.58* 1.15 1.40*  --   --  1.53* 1.49*  --    Albumin g/dL 2.0* 1.8* 2.1*  --   --   --  1.7*  --    Bilirubin Total mg/dL 0.9 0.9 1.2  --   --   --  1.9*  --    ALP unit/L 79 77 90  --   --   --  89  --    AST unit/L 41 45 65*  --   --   --  61*  --    ALT unit/L 20 21 27  --   --   --  30  --    Magnesium Level mg/dL 2.10 1.90  --   --   --   --  1.80  --    Phosphorus Level mg/dL 2.4 3.3  --   --   --   --  3.8  --        Vancomycin Administrations:  vancomycin given in the last 96 hours                     vancomycin (VANCOCIN) 1,000 mg in D5W 250 mL IVPB (admixture device) (mg) 1,000 mg New Bag 04/19/25 1849     1,000 mg New Bag  0617    vancomycin (VANCOCIN) 1,000 mg in D5W 250 mL IVPB (admixture device) (mg) 1,000 mg New Bag 04/18/25 2038    vancomycin (VANCOCIN) 1,000 mg in D5W 250 mL IVPB (admixture device) (mg) 1,000 mg New Bag 04/18/25 1856                    Microbiologic Results:  Microbiology Results (last 7 days)       Procedure Component Value Units Date/Time    Blood Culture [[id number]]  (Normal) Collected: 04/18/25 1645    Order Status: Completed Specimen: Blood Updated: 04/19/25 1801     Blood Culture No Growth At 24 Hours    Blood Culture [[id number]]  (Normal) Collected: 04/18/25 1645    Order Status: Completed Specimen: Blood Updated: 04/19/25 1801     Blood Culture No Growth At 24 Hours    Blood Culture [[id number]]  (Normal) Collected: 04/15/25 1637    Order Status: Completed Specimen: Blood Updated: 04/19/25 1801     Blood Culture No Growth At 96 Hours    Blood Culture [[id number]]  (Normal) Collected: 04/15/25 1637    Order Status: Completed Specimen: Blood Updated: 04/19/25 1801     Blood Culture  No Growth At 96 Hours    Respiratory Culture [[id number]] Collected: 04/18/25 1705    Order Status: Completed Specimen: Sputum Updated: 04/19/25 0905     Respiratory Culture Rare normal respiratory katarina     GRAM STAIN Quality 2+      Many Gram positive cocci      Moderate Gram Positive Rods    Urine culture [[id number]] Collected: 04/15/25 1500    Order Status: Completed Specimen: Urine Updated: 04/17/25 0824     Urine Culture No Growth

## 2025-04-19 NOTE — PROGRESS NOTES
Inpatient Nutrition Assessment    Admit Date: 4/15/2025   Total duration of encounter: 4 days   Patient Age: 49 y.o.    Nutrition Recommendation/Prescription     Goal diet (heart healthy + diabetic) once medically appropriate. Texture modifications per SLP.    -Can add Boost Glucose Control if inadequate oral intake once diet advanced. Thicken per SLP recommendations.  If unable to start oral diet, recommend NG placement for enteral nutrition. Consult RD for tube feeding recommendations.    Communication of Recommendations: reviewed with nurse    Nutrition Assessment     Malnutrition Assessment/Nutrition-Focused Physical Exam    Malnutrition Context: acute illness or injury (04/16/25 1422)  Malnutrition Level: other (see comments) (Does not meet criteria) (04/16/25 1422)                                                        A minimum of two characteristics is recommended for diagnosis of either severe or non-severe malnutrition.    Chart Review    Reason Seen: follow-up    Malnutrition Screening Tool Results   Have you recently lost weight without trying?: Unsure  Have you been eating poorly because of a decreased appetite?: No   MST Score: 2   Diagnosis:  In-hospital ([facility]) cardiac arrest secondary to third-degree AV block, s/p transvenous pacemaker.  Acute respiratory failure secondary to third-degree AV block s/p intubation  CARMEL secondary to ATN  Non-insulin-dependent diabetes mellitus type 2    Relevant Medical History: non-insulin-dependent type 2 diabetes mellitus, hypothyroidism, seizure disorder, unspecified psychiatric disorder, intellectual disability     Scheduled Medications:  albuterol-ipratropium, 3 mL, Q6H  ARIPiprazole, 5 mg, Daily  aspirin, 81 mg, Daily  magnesium sulfate 2 g IVPB, 2 g, Once  mupirocin, , BID  piperacillin-tazobactam (Zosyn) IV (PEDS and ADULTS) (extended infusion is not appropriate), 4.5 g, Q8H  vancomycin (VANCOCIN) 1,000 mg in D5W 250 mL IVPB (admixture device), 1,000 mg,  Q12H    Continuous Infusions:  dexmedeTOMIDine (Precedex) infusion (titrating), Last Rate: Stopped (04/18/25 0913)  lactated ringers, Last Rate: 100 mL/hr at 04/18/25 1251  propofoL, Last Rate: Stopped (04/18/25 0858)    PRN Medications:   acetaminophen, 650 mg, Q4H PRN  dextrose 50%, 12.5 g, PRN  glucagon (human recombinant), 1 mg, PRN  hydrALAZINE, 10 mg, Q4H PRN  insulin aspart U-100, 0-5 Units, Q6H PRN  sodium chloride 0.9%, 10 mL, PRN  sodium chloride 0.9%, 3 mL, PRN  vancomycin - pharmacy to dose, , pharmacy to manage frequency    Calorie Containing IV Medications: no significant kcals from medications at this time    Recent Labs   Lab 04/15/25  1317 04/15/25  1637 04/16/25  0317 04/17/25  0304 04/18/25  0310 04/18/25  0318 04/18/25  1509 04/18/25  2130 04/19/25  0442     --  142 140  --  142 141 139 138   K 5.2*  --  4.3 3.8  --  4.0 4.4 4.1 4.0   CALCIUM 8.6  --  8.3* 8.2*  --  8.8 8.4 8.6 8.7   PHOS 3.1  --  3.4 2.4  --  3.3  --   --  3.8   MG 1.60  --  3.00* 2.10  --  1.90  --   --  1.80   *  --  110* 110*  --  113* 110* 109* 110*   CO2 21*  --  18* 22  --  19* 13* 18* 20*   BUN 48.4*  --  49.6* 43.3*  --  41.6* 48.1* 47.3* 39.7*   CREATININE 2.14*  --  2.05* 1.58*  --  1.15 1.40* 1.53* 1.49*   EGFRNORACEVR 37  --  39 54  --  >60 >60 56 58   GLUCOSE 153*  --  157* 117*  --  118* 175* 229* 147*   BILITOT 0.8  --  1.0 0.9  --  0.9 1.2  --  1.9*   ALKPHOS 77  --  79 79  --  77 90  --  89   ALT 18  --  20 20  --  21 27  --  30   AST 55*  --  46* 41  --  45 65*  --  61*   ALBUMIN 2.3*  --  2.2* 2.0*  --  1.8* 2.1*  --  1.7*   AMMONIA  --  40.9  --   --   --   --   --   --   --    WBC 8.71  --  10.97 8.65 6.33  --  6.23  --   --    HGB 12.5*  --  13.1* 10.9* 12.0*  --  11.9*  --   --    HCT 39.5*  --  41.5* 33.0* 36.4*  --  37.3*  --   --      Nutrition Orders:  Diet NPO      Appetite/Oral Intake: NPO/not applicable  Factors Affecting Nutritional Intake: NPO and on mechanical ventilation  Social  "Needs Impacting Access to Food: unable to assess at this time; will attempt on follow-up  Food/Lutheran/Cultural Preferences: unable to obtain  Food Allergies: none reported  Last Bowel Movement: 04/14/25  Wound(s): no pressure injuries documented at this time     Comments    4/16/25 Patient on mechanical ventilation, receiving calories from propofol, recommend tube feeding when feasible, nurse reports possible extubation today; recommend Peptamen Intense VHP with protein modular to best meet needs at this time.    4/17/25 Patient remains on ventilator and propofol (decreased), nurse reports plans for pacemaker placement today and extubation attempt after, tube feeding recommendation provided in case oral intake remains not feasible.    4/19/25 Extubated and propofol discontinued. On vapotherm, passed waters with RN. If unable to advance to oral diet today, recommend NG placements for tube feedings. Inadequate energy since admit.    Anthropometrics    Height: 5' 4" (162.6 cm), Height Method: Estimated  Last Weight: 84.7 kg (186 lb 11.7 oz) (04/19/25 0600), Weight Method: Bed Scale  BMI (Calculated): 32  BMI Classification: obese grade I (BMI 30-34.9)        Ideal Body Weight (IBW), Male: 130 lb     % Ideal Body Weight, Male (lb): 135.33 %                          Usual Weight Provided By: unable to obtain usual weight    Wt Readings from Last 5 Encounters:   04/19/25 84.7 kg (186 lb 11.7 oz)   06/06/23 80 kg (176 lb 4.8 oz)   05/08/23 82.6 kg (182 lb 1.6 oz)   09/06/21 83.9 kg (184 lb 15.5 oz)     Weight Change(s) Since Admission:   4/19/25 84.7kg  4/16/25 verified weight of 79.8 kg on bed scale during rounds  Wt Readings from Last 1 Encounters:   04/19/25 0600 84.7 kg (186 lb 11.7 oz)   04/18/25 0554 84.3 kg (185 lb 13.6 oz)   04/15/25 1307 79.8 kg (175 lb 14.8 oz)   Admit Weight: 79.8 kg (175 lb 14.8 oz) (04/15/25 1307), Weight Method: Bed Scale    Estimated Needs  Updated 4/19/25  Weight Used For Calorie " Calculations: 79.8 kg (175 lb 14.8 oz)  Energy Calorie Requirements (kcal): 2394kcals/d (30kcals/kg)  Energy Need Method: Kcal/kg  Weight Used For Protein Calculations: 84.7 kg (186 lb 11.7 oz)  Protein Requirements: 102g/d (1.2g/kg)  Fluid Requirements (mL): 1596, 20 ml/kg  CHO Requirement: 240-270 g, 40-45% calories     Enteral Nutrition Patient not receiving enteral nutrition at this time.    Parenteral Nutrition Patient not receiving parenteral nutrition support at this time.    Evaluation of Received Nutrient Intake    Calories: not meeting estimated needs  Protein: not meeting estimated needs    Patient Education Not applicable.    Nutrition Diagnosis     PES: Inadequate energy intake related to inability to consume sufficient nutrients as evidenced by less than 80% needs met. (active)    PES: N/A           Nutrition Interventions     Intervention(s): general/healthful diet, modified composition of meals/snacks, and collaboration with other providers  Intervention(s): N/A       Goal: Meet greater than 80% of nutritional needs by follow-up. (goal not met)  Goal: Tolerate enteral feeding at goal rate by follow-up. (goal discontinued)    Nutrition Goals & Monitoring     Dietitian will monitor: food and beverage intake, energy intake, enteral nutrition intake, weight, electrolyte/renal panel, beliefs/attitudes, glucose/endocrine profile, and gastrointestinal profile  Discharge planning: too early to determine; pending clinical course  Nutrition Risk/Follow-Up: high (follow-up in 1-4 days)   Please consult if re-assessment needed sooner.

## 2025-04-19 NOTE — PLAN OF CARE
Problem: Occupational Therapy  Goal: Occupational Therapy Goal  Description: Goals to be met by: 5/19/25     Patient will increase functional independence with ADLs by performing:    Feeding TBD pending clearance for diet.   UE Dressing with Stand-by Assistance.  LE Dressing with Stand-by Assistance.  Grooming while standing at sink with Stand-by Assistance.  Toileting from toilet with Contact Guard Assistance for hygiene and clothing management.   Toilet transfer to toilet with Contact Guard Assistance.  Increased functional strength to 4/5 in BUEs through therapeutic exercise.  Outcome: Progressing

## 2025-04-19 NOTE — PT/OT/SLP EVAL
Occupational Therapy  Evaluation    Name: Bacilio Arguelles  MRN: [id number]  Recent Surgery: Procedure(s) (LRB):  [id number] (N/A) 2 Days Post-Op    Recommendations:     Discharge therapy intensity: Moderate Intensity Therapy   Discharge Equipment Recommendations:  to be determined by next level of care  Barriers to discharge:  Other (Comment) (ongoing medical needs)    Assessment:     Bacilio Agruelles is a 49 y.o. male with a medical diagnosis of cardiac arrest; syncope due to high grade AV block s/p TVP and now s/p leadless PPM. Of note, pt with PMH of seizure, psychological disorder and intellectual disability.  He presents with the following performance deficits affecting function: weakness, impaired endurance, impaired cognition, impaired self care skills, impaired functional mobility, gait instability, impaired balance, edema, decreased safety awareness, decreased lower extremity function, decreased upper extremity function, impaired cardiopulmonary response to activity.     Pt tolerated OT evaluation fairly well, presents very fearful of mobility requiring maximal education and encouragement to participate. Pt's vitals remained stable throughout and patient fairly calm during mobility with maximal support/encouragement provided. Pt required max A for bed mobility and scooting to EOB (very minimal participation in scooting). Pt required mod-max A for sitting balance and mod A x2 for sit>stand with HHA. Pt able to remain standing while bed padding was adjusted, then noted to sit quickly. Unable to progress to steps this date due to pt unwilling/fearful. Pt with significant weakness at BUEs and edema noted, limiting anti-gravity movement. Pt requires max A for grooming and dressing at this time. Recommending moderate intensity therapy upon d/c.     Rehab Prognosis: Good; patient would benefit from acute skilled OT services to address these deficits and reach maximum level of function.    "    Plan:     Patient to be seen 5 x/week to address the above listed problems via self-care/home management, therapeutic activities, therapeutic exercises  Plan of Care Expires: 05/19/25  Plan of Care Reviewed with: patient    Subjective     Chief Complaint: "I dont want to"  Patient/Family Comments/goals: to go home    Occupational Profile:  Living Environment: lives in group home; has walk in shower with seat per pt report  Previous level of function: independent  Roles and Routines: friend  Equipment Used at Home: shower chair  Assistance upon Discharge: unknown    Pain/Comfort:  Pain Rating 1: 0/10    Patients cultural, spiritual, Mormonism conflicts given the current situation: no    Objective:     OT communicated with RN prior to session.      Patient was found HOB elevated with blood pressure cuff, pulse ox (continuous), telemetry, martinez catheter, peripheral IV, SCD, oxygen upon OT entry to room.    General Precautions: Standard, fall  Orthopedic Precautions: N/A  Braces: N/A    Vital Signs: Blood Pressure: 161/77  HR:  pacing on monitor  Sp02: 90-94% throughout  Supplemental 02: Vapotherm 30 L/min at 50% concentration     Bed Mobility:    Patient completed Supine to Sit with maximal assistance  Patient completed Sit to Supine with moderate assistancex2    Functional Mobility/Transfers:  Patient completed Sit <> Stand Transfer with moderate assistance x2 with hand-held assist   Functional Mobility: unable to take steps this date, pt very weak and fearful     Activities of Daily Living:  Grooming: maximal assistance to fully wash face at bed level, pt limited by BUE weakness (able to bring wash cloth to face with LUE but unable to perform hygiene)  Lower Body Dressing: maximal assistance donning socks at bed level  Toileting: total assistance martinez in place    AMPA 6 Click ADL:  AMPAC Total Score: 11    Functional Cognition:  Affect: Anxious  Orientation: oriented to Person, Place, and year  Command " Following: Requires verbal cues and encouragement, able to follow simple commands  Safety Awareness: Impaired.   Insight into Deficits: Impaired.     Visual Perceptual Skills:  Appears intact, pt with frequent blinking     Upper Extremity Function:  Right Upper Extremity:   Pt with global weakness and edema. Strength 3-/5 at shoulder (very minimal anti-gravity), 3-/5 elbow flexion,  3+/5    Left Upper Extremity:  Pt with global weakness and edema. Strength 3-/5 at shoulder (~45 deg), 3/5 elbow flexion (able to touch nose, unable to reach upper face),  3+/5    Balance:   Static sitting balance: mod-max A  Dynamic sitting balance:max A  Static standing balance:min-mod A x2    Therapeutic Positioning  Risk for acquired pressure injuries is increased due to impaired mobility.    OT interventions performed during the course of today's session:   Education was provided on benefits of and recommendations for therapeutic positioning    Skin assessment: visible skin intact, edema at BUEs (elevated at end of session)    OT recommendations for therapeutic positioning throughout hospitalization:   Follow [facility] Pressure Injury Prevention Protocol  Geomat recommended for sacral protection while [facility]    Patient Education:  Patient provided with verbal education education regarding OT role/goals/POC, fall prevention, safety awareness, and Discharge/DME recommendations.  Understanding was verbalized, however additional teaching warranted.     Patient left HOB elevated, left on back per turning schedule with BUE elevated on pillows and all lines intact, call button in reach, and RN notified.    GOALS:   Multidisciplinary Problems       Occupational Therapy Goals          Problem: Occupational Therapy    Goal Priority Disciplines Outcome Interventions   Occupational Therapy Goal     OT, PT/OT Progressing    Description: Goals to be met by: 5/19/25     Patient will increase functional independence with ADLs by  performing:    Feeding TBD pending clearance for diet.   UE Dressing with Stand-by Assistance.  LE Dressing with Stand-by Assistance.  Grooming while standing at sink with Stand-by Assistance.  Toileting from toilet with Contact Guard Assistance for hygiene and clothing management.   Toilet transfer to toilet with Contact Guard Assistance.  Increased functional strength to 4/5 in BUEs through therapeutic exercise.                       History:     Past Medical History:   Diagnosis Date    Anxiety disorder, unspecified     Depression     DM (diabetes mellitus), type 2     GERD (gastroesophageal reflux disease)     HLD (hyperlipidemia)     Hypothyroidism, unspecified     Intellectual disability     Mood disorder     Seizure disorder          Past Surgical History:   Procedure Laterality Date    ARTHROPLASTY,HIP,TOTAL, BILATERAL, POSTERIOR APPROACH Bilateral        Time Tracking:     OT Date of Treatment: 04/19/25  OT Start Time: 1139  OT Stop Time: 1200  OT Total Time (min): 21 min    Billable Minutes:Evaluation mod     4/19/2025

## 2025-04-19 NOTE — PLAN OF CARE
Problem: Adult Inpatient Plan of Care  Goal: Plan of Care Review  Outcome: Progressing  Goal: Patient-Specific Goal (Individualized)  Outcome: Progressing  Goal: Absence of Hospital-Acquired Illness or Injury  Outcome: Progressing  Goal: Optimal Comfort and Wellbeing  Outcome: Progressing     Problem: Delirium  Goal: Optimal Coping  Outcome: Progressing  Goal: Improved Behavioral Control  Outcome: Progressing  Goal: Improved Attention and Thought Clarity  Outcome: Progressing  Goal: Improved Sleep  Outcome: Progressing     Problem: Skin Injury Risk Increased  Goal: Skin Health and Integrity  Outcome: Progressing     Problem: Infection  Goal: Absence of Infection Signs and Symptoms  Outcome: Progressing     Problem: Adult Inpatient Plan of Care  Goal: Readiness for Transition of Care  Outcome: Not Progressing

## 2025-04-19 NOTE — PROGRESS NOTES
[facility] - 7th Floor ICU  Pulmonary Critical Care Note    Patient Name: Bacilio Arguelles  MRN: [id number]  Admission Date: 4/15/2025  Hospital Length of Stay: 4 days  Code Status: Full Code  Attending Provider: Nj Becerra MD  Primary Care Provider: Malik Zavala MD     Subjective:     HPI:   Fortunately old male with past medical history of non-insulin-dependent type 2 diabetes mellitus, hypothyroidism, seizure disorder, unspecified psychiatric disorder, intellectual disability [place] permanent pacemaker [place].  Patient passed out and was unresponsive for 2 minutes at group home, with heart rate ranging between 20s- 30s, given atropine, in the ER he was found to be in complete heart block, later went into asystole, ROSC achieved 2 minutes post CPR.  Immediately taken to cath lab and had a transvenous pacemaker placed and then admitted to ICU.  Per chart review, not on any AV germania blocking agents, no reversible causes identified, transferred to evaluate/need for permanent pacemaker placement.  No history is obtainable at this time as patient is intubated and sedated.      Past 24 hours:  Patient on Vapotherm.  This morning he is awake and alert.  Hemodynamically stable and paced.    Past Medical History:   Diagnosis Date    Anxiety disorder, unspecified     Depression     DM (diabetes mellitus), type 2     GERD (gastroesophageal reflux disease)     HLD (hyperlipidemia)     Hypothyroidism, unspecified     Intellectual disability     Mood disorder     Seizure disorder        Past Surgical History:   Procedure Laterality Date    ARTHROPLASTY,HIP,TOTAL, BILATERAL, POSTERIOR APPROACH Bilateral        Social History[1]        Current Outpatient Medications   Medication Instructions    alfuzosin (UROXATRAL) 10 mg, Oral, With dinner    ARIPiprazole (ABILIFY) 5 mg, Oral    aspirin (ECOTRIN) 81 MG EC tablet Aspir-81 mg tablet,delayed release   Take 1  tablet every day by oral route.    atorvastatin (LIPITOR) 20 MG tablet atorvastatin 20 mg tablet    DAYVIGO 5 mg Tab 1 tablet, Oral    divalproex (DEPAKOTE) 125 mg, Oral, Nightly    fenofibrate (TRICOR) 145 MG tablet fenofibrate nanocrystallized 145 mg tablet    folic acid (FOLVITE) 1 mg, Oral, Daily    furosemide (LASIX) 20 mg, Oral, Daily PRN    glipiZIDE 5 mg, Oral, With breakfast    levothyroxine (SYNTHROID) 25 MCG tablet levothyroxine 25 mcg tablet    paliperidone palmitate (INVEGA SUSTENNA) 117 mg/0.75 mL Syrg injection Invega Sustenna 117 mg/0.75 mL intramuscular syringe    pantoprazole (PROTONIX) 40 mg, Oral    propranolol HCl (PROPRANOLOL ORAL) 5 mg, Oral, 2 times daily    sodium bicarbonate 650 mg, Oral, 3 times daily    TRINTELLIX 20 mg Tab 1 tablet, Oral    ursodioL (ACTIGALL) 300 mg capsule Oral, 3 times daily       Review of patient's allergies indicates:  No Known Allergies     Current Inpatient Medications   ARIPiprazole  5 mg Per OG tube Daily    aspirin  81 mg Per OG tube Daily    magnesium sulfate 2 g IVPB  2 g Intravenous Once    mupirocin   Nasal BID    piperacillin-tazobactam (Zosyn) IV (PEDS and ADULTS) (extended infusion is not appropriate)  4.5 g Intravenous Q8H    vancomycin (VANCOCIN) 1,000 mg in D5W 250 mL IVPB (admixture device)  1,000 mg Intravenous Q12H       Current Intravenous Infusions   dexmedeTOMIDine (Precedex) infusion (titrating)  0-1.4 mcg/kg/hr Intravenous Continuous   Stopped at 04/18/25 0913    lactated ringers   Intravenous Continuous 100 mL/hr at 04/18/25 1251 Rate Change at 04/18/25 1251    propofoL  0-50 mcg/kg/min Intravenous Continuous   Stopped at 04/18/25 0858            Unable to do review of systems as patient is intubated and sedated.    Objective:       Intake/Output Summary (Last 24 hours) at 4/19/2025 0857  Last data filed at 4/19/2025 0606  Gross per 24 hour   Intake 2840.03 ml   Output 1990 ml   Net 850.03 ml         Vital Signs (Most Recent):  Temp: 98.4 °F  (36.9 °C) (04/19/25 0730)  Pulse: 60 (04/19/25 0730)  Resp: (!) 28 (04/19/25 0730)  BP: 130/83 (04/19/25 0730)  SpO2: 100 % (04/19/25 0730)  Body mass index is 32.05 kg/m².  Weight: 84.7 kg (186 lb 11.7 oz) Vital Signs (24h Range):  Temp:  [98 °F (36.7 °C)-102.5 °F (39.2 °C)] 98.4 °F (36.9 °C)  Pulse:  [] 60  Resp:  [13-45] 28  SpO2:  [90 %-100 %] 100 %  BP: ()/() 130/83     Physical Exam  Vital signs and nursing notes reviewed.  Constitutional: NAD.    Head: Atraumatic. Normocephalic.  Eyes: Conjunctivae nl. No scleral icterus.  ENT: Mucous membranes are moist. Oropharynx is clear.  Neck: Supple. No lymphadenopathy.  Transvenous pacemaker in the right internal jugular  Cardiovascular: Regular rate and rhythm. No murmurs, rubs, or gallops. Distal pulses are 2+ and symmetric.  Pulmonary/Chest: No respiratory distress. Clear to auscultation bilaterally. No wheezing, rales, or rhonchi.  Abdominal: Soft. Non-distended. No TTP. No rebound, guarding, or rigidity.   Musculoskeletal:  Trace bilateral pedal edema.  Skin: Warm and dry.  Neurological:  Full range of motion    Lines/Drains/Airways       Drain  Duration                  Urethral Catheter 04/15/25 2000 3 days              Peripheral Intravenous Line  Duration                  Peripheral IV - Single Lumen 04/15/25 1200 20 G Anterior;Proximal;Right Forearm 3 days         Peripheral IV - Single Lumen 04/18/25 0215 20 G 2 in Anterior;Right Upper Arm 1 day         Peripheral IV - Single Lumen 04/18/25 1804 18 G Anterior;Left;Proximal Forearm <1 day                    Significant Labs:    Lab Results   Component Value Date    WBC 6.23 04/18/2025    HGB 11.9 (L) 04/18/2025    HCT 37.3 (L) 04/18/2025    .5 (H) 04/18/2025     (L) 04/18/2025           BMP  Lab Results   Component Value Date     04/19/2025    K 4.0 04/19/2025    CO2 20 (L) 04/19/2025    BUN 39.7 (H) 04/19/2025    CREATININE 1.49 (H) 04/19/2025    CALCIUM 8.7 04/19/2025     AGAP 8.0 04/19/2025         ABG  Recent Labs   Lab 04/18/25  1829   PH 7.390   PO2 189.0*   PCO2 30.0*   HCO3 18.2*   POCBASEDEF -5.80*       Mechanical Ventilation Support:  Vent Mode: A/C (04/18/25 0745)  Ventilator Initiated: Yes (04/15/25 1125)  Set Rate: 18 BPM (04/18/25 0745)  Vt Set: 450 mL (04/18/25 0745)  Pressure Support: 10 cmH20 (04/16/25 0551)  PEEP/CPAP: 5 cmH20 (04/18/25 0745)  Oxygen Concentration (%): 90 (04/19/25 0730)  Peak Airway Pressure: 34 cmH20 (04/18/25 0745)  Total Ve: 7.1 L/m (04/18/25 0745)  F/VT Ratio<105 (RSBI): (!) 47.5 (04/18/25 0526)      Significant Imaging:  CTA shows no embolism.  There is bilateral atelectasis lower lobe consolidation with tiny effusions.        Assessment/Plan:     Assessment  In-hospital (IMC) cardiac arrest secondary to third-degree AV block, s/p transvenous pacemaker.  Acute respiratory failure secondary to third-degree AV block s/p intubation  CARMEL secondary to ATN  Non-insulin-dependent diabetes mellitus type 2      Plan    Wean oxygen as tolerated   Insulin sliding scale  We will add albuterol nebs do help with ventilation  Continue broad-spectrum antibiotic and adjust accordingly    DVT Prophylaxis:  SCD  GI Prophylaxis:  None     31 minutes of critical care was time spent personally by me on the following activities: development of treatment plan with patient or surrogate and bedside caregivers, discussions with consultants, evaluation of patient's response to treatment, examination of patient, ordering and performing treatments and interventions, ordering and review of laboratory studies, ordering and review of radiographic studies, pulse oximetry, re-evaluation of patient's condition.  This critical care time did not overlap with that of any other provider or involve time for any procedures.     Reynaldo Orellana MD  Pulmonary Critical Care Medicine  [facility] - 7th Floor ICU  DOS: 04/19/2025           [1]   Social  History  Socioeconomic History    Marital status: Single   Tobacco Use    Smoking status: Never    Smokeless tobacco: Never   Substance and Sexual Activity    Alcohol use: Never    Drug use: Never    Sexual activity: Not Currently     Social Drivers of Health     Financial Resource Strain: Patient Unable To Answer (4/18/2025)    Overall Financial Resource Strain (CARDIA)     Difficulty of Paying Living Expenses: Patient unable to answer   Food Insecurity: Patient Unable To Answer (4/18/2025)    Hunger Vital Sign     Worried About Running Out of Food in the Last Year: Patient unable to answer     Ran Out of Food in the Last Year: Patient unable to answer   Transportation Needs: Patient Unable To Answer (4/18/2025)    PRAPARE - Transportation     Lack of Transportation (Medical): Patient unable to answer     Lack of Transportation (Non-Medical): Patient unable to answer   Stress: Patient Unable To Answer (4/18/2025)    [employer] - Occupational Stress Questionnaire     Feeling of Stress : Patient unable to answer   Housing Stability: Patient Unable To Answer (4/18/2025)    Housing Stability Vital Sign     Unable to Pay for Housing in the Last Year: Patient unable to answer     Homeless in the Last Year: Patient unable to answer

## 2025-04-19 NOTE — PLAN OF CARE
Problem: Adult Inpatient Plan of Care  Goal: Plan of Care Review  Outcome: Progressing  Flowsheets (Taken 4/19/2025 1851)  Plan of Care Reviewed With: patient  Goal: Patient-Specific Goal (Individualized)  Outcome: Progressing  Flowsheets (Taken 4/19/2025 1851)  Individualized Care Needs: encourage IS use, encourage deep breathing and coughing, NPO until speech eval, CBG Q6, martinez care, IVF LR @50, IV ABX vanc and zosyn, monitor temperature, encourage self care and perforamce of ADL's independent  Anxieties, Fears or Concerns: na  Goal: Absence of Hospital-Acquired Illness or Injury  Outcome: Progressing  Intervention: Identify and Manage Fall Risk  Flowsheets (Taken 4/19/2025 1851)  Safety Promotion/Fall Prevention:   assistive device/personal item within reach   Fall Risk reviewed with patient/family   Fall Risk signage in place   lighting adjusted   medications reviewed   instructed to call staff for mobility   nonskid shoes/socks when out of bed   /camera at bedside   side rails raised x 3   room near unit station  Intervention: Prevent Skin Injury  Flowsheets (Taken 4/19/2025 1851)  Body Position:   position changed independently   weight shifting  Skin Protection: drying agents applied  Device Skin Pressure Protection: absorbent pad utilized/changed  Intervention: Prevent and Manage VTE (Venous Thromboembolism) Risk  Flowsheets (Taken 4/19/2025 1851)  VTE Prevention/Management:   remove, assess skin, and reapply sequential compression device   ambulation promoted   ROM (active) performed   ROM (passive) performed   intravenous hydration   dorsiflexion/plantar flexion performed  Intervention: Prevent Infection  Flowsheets (Taken 4/19/2025 1851)  Infection Prevention:   environmental surveillance performed   equipment surfaces disinfected   hand hygiene promoted   personal protective equipment utilized   rest/sleep promoted   single patient room provided  Goal: Optimal Comfort and  Wellbeing  Outcome: Progressing  Goal: Readiness for Transition of Care  Outcome: Progressing     Problem: Delirium  Goal: Optimal Coping  Outcome: Progressing  Goal: Improved Behavioral Control  Outcome: Progressing  Goal: Improved Attention and Thought Clarity  Outcome: Progressing  Goal: Improved Sleep  Outcome: Progressing     Problem: Skin Injury Risk Increased  Goal: Skin Health and Integrity  Outcome: Progressing     Problem: Infection  Goal: Absence of Infection Signs and Symptoms  Outcome: Progressing

## 2025-04-20 LAB
ALBUMIN SERPL-MCNC: 1.8 G/DL (ref 3.5–5)
ALBUMIN/GLOB SERPL: 0.5 RATIO (ref 1.1–2)
ALP SERPL-CCNC: 85 UNIT/L (ref 40–150)
ALT SERPL-CCNC: 37 UNIT/L (ref 0–55)
ANION GAP SERPL CALC-SCNC: 7 MEQ/L
AST SERPL-CCNC: 63 UNIT/L (ref 11–45)
BACTERIA BLD CULT: NORMAL
BACTERIA BLD CULT: NORMAL
BACTERIA SPEC CULT: NORMAL
BASOPHILS # BLD AUTO: 0.03 X10(3)/MCL
BASOPHILS NFR BLD AUTO: 0.5 %
BILIRUB SERPL-MCNC: 2.2 MG/DL
BUN SERPL-MCNC: 35.8 MG/DL (ref 8.9–20.6)
CALCIUM SERPL-MCNC: 8.1 MG/DL (ref 8.4–10.2)
CHLORIDE SERPL-SCNC: 110 MMOL/L (ref 98–107)
CO2 SERPL-SCNC: 19 MMOL/L (ref 22–29)
CREAT SERPL-MCNC: 1.19 MG/DL (ref 0.72–1.25)
CREAT/UREA NIT SERPL: 30
EOSINOPHIL # BLD AUTO: 0.09 X10(3)/MCL (ref 0–0.9)
EOSINOPHIL NFR BLD AUTO: 1.5 %
ERYTHROCYTE [DISTWIDTH] IN BLOOD BY AUTOMATED COUNT: 15.1 % (ref 11.5–17)
GFR SERPLBLD CREATININE-BSD FMLA CKD-EPI: >60 ML/MIN/1.73/M2
GLOBULIN SER-MCNC: 3.6 GM/DL (ref 2.4–3.5)
GLUCOSE SERPL-MCNC: 118 MG/DL (ref 74–100)
GRAM STN SPEC: NORMAL
HCT VFR BLD AUTO: 30.9 % (ref 42–52)
HGB BLD-MCNC: 10.1 G/DL (ref 14–18)
IMM GRANULOCYTES # BLD AUTO: 0.04 X10(3)/MCL (ref 0–0.04)
IMM GRANULOCYTES NFR BLD AUTO: 0.6 %
LYMPHOCYTES # BLD AUTO: 1.35 X10(3)/MCL (ref 0.6–4.6)
LYMPHOCYTES NFR BLD AUTO: 21.9 %
MAGNESIUM SERPL-MCNC: 2.3 MG/DL (ref 1.6–2.6)
MCH RBC QN AUTO: 32.8 PG (ref 27–31)
MCHC RBC AUTO-ENTMCNC: 32.7 G/DL (ref 33–36)
MCV RBC AUTO: 100.3 FL (ref 80–94)
MONOCYTES # BLD AUTO: 0.4 X10(3)/MCL (ref 0.1–1.3)
MONOCYTES NFR BLD AUTO: 6.5 %
NEUTROPHILS # BLD AUTO: 4.26 X10(3)/MCL (ref 2.1–9.2)
NEUTROPHILS NFR BLD AUTO: 69 %
NRBC BLD AUTO-RTO: 0 %
PHOSPHATE SERPL-MCNC: 3.5 MG/DL (ref 2.3–4.7)
PLATELET # BLD AUTO: 127 X10(3)/MCL (ref 130–400)
PLATELETS.RETICULATED NFR BLD AUTO: 1.5 % (ref 0.9–11.2)
PMV BLD AUTO: 10.2 FL (ref 7.4–10.4)
POCT GLUCOSE: 123 MG/DL (ref 70–110)
POCT GLUCOSE: 123 MG/DL (ref 70–110)
POCT GLUCOSE: 136 MG/DL (ref 70–110)
POCT GLUCOSE: 188 MG/DL (ref 70–110)
POCT GLUCOSE: 232 MG/DL (ref 70–110)
POTASSIUM SERPL-SCNC: 5.2 MMOL/L (ref 3.5–5.1)
PROT SERPL-MCNC: 5.4 GM/DL (ref 6.4–8.3)
RBC # BLD AUTO: 3.08 X10(6)/MCL (ref 4.7–6.1)
SODIUM SERPL-SCNC: 136 MMOL/L (ref 136–145)
VANCOMYCIN SERPL-MCNC: 17.4 UG/ML (ref 15–20)
WBC # BLD AUTO: 6.17 X10(3)/MCL (ref 4.5–11.5)

## 2025-04-20 PROCEDURE — 80202 ASSAY OF VANCOMYCIN: CPT | Performed by: INTERNAL MEDICINE

## 2025-04-20 PROCEDURE — 11000001 HC ACUTE MED/SURG PRIVATE ROOM

## 2025-04-20 PROCEDURE — 63600175 PHARM REV CODE 636 W HCPCS: Performed by: STUDENT IN AN ORGANIZED HEALTH CARE EDUCATION/TRAINING PROGRAM

## 2025-04-20 PROCEDURE — 84100 ASSAY OF PHOSPHORUS: CPT | Performed by: INTERNAL MEDICINE

## 2025-04-20 PROCEDURE — 85025 COMPLETE CBC W/AUTO DIFF WBC: CPT

## 2025-04-20 PROCEDURE — 94761 N-INVAS EAR/PLS OXIMETRY MLT: CPT

## 2025-04-20 PROCEDURE — 25000003 PHARM REV CODE 250: Performed by: HOSPITALIST

## 2025-04-20 PROCEDURE — 99900031 HC PATIENT EDUCATION (STAT)

## 2025-04-20 PROCEDURE — 63600175 PHARM REV CODE 636 W HCPCS

## 2025-04-20 PROCEDURE — 94640 AIRWAY INHALATION TREATMENT: CPT

## 2025-04-20 PROCEDURE — 83735 ASSAY OF MAGNESIUM: CPT | Performed by: INTERNAL MEDICINE

## 2025-04-20 PROCEDURE — 94799 UNLISTED PULMONARY SVC/PX: CPT

## 2025-04-20 PROCEDURE — 27000221 HC OXYGEN, UP TO 24 HOURS

## 2025-04-20 PROCEDURE — 25000003 PHARM REV CODE 250: Performed by: STUDENT IN AN ORGANIZED HEALTH CARE EDUCATION/TRAINING PROGRAM

## 2025-04-20 PROCEDURE — 99900035 HC TECH TIME PER 15 MIN (STAT)

## 2025-04-20 PROCEDURE — 63600175 PHARM REV CODE 636 W HCPCS: Performed by: INTERNAL MEDICINE

## 2025-04-20 PROCEDURE — 25000242 PHARM REV CODE 250 ALT 637 W/ HCPCS: Performed by: HOSPITALIST

## 2025-04-20 PROCEDURE — 25000003 PHARM REV CODE 250: Performed by: INTERNAL MEDICINE

## 2025-04-20 PROCEDURE — 80053 COMPREHEN METABOLIC PANEL: CPT | Performed by: INTERNAL MEDICINE

## 2025-04-20 PROCEDURE — 36415 COLL VENOUS BLD VENIPUNCTURE: CPT

## 2025-04-20 PROCEDURE — 94760 N-INVAS EAR/PLS OXIMETRY 1: CPT

## 2025-04-20 PROCEDURE — 63600175 PHARM REV CODE 636 W HCPCS: Performed by: HOSPITALIST

## 2025-04-20 PROCEDURE — 97530 THERAPEUTIC ACTIVITIES: CPT | Mod: CQ

## 2025-04-20 RX ORDER — FENOFIBRATE 145 MG/1
145 TABLET, FILM COATED ORAL DAILY
Status: DISCONTINUED | OUTPATIENT
Start: 2025-04-21 | End: 2025-04-23 | Stop reason: HOSPADM

## 2025-04-20 RX ORDER — LEVOTHYROXINE SODIUM 25 UG/1
25 TABLET ORAL
Status: DISCONTINUED | OUTPATIENT
Start: 2025-04-21 | End: 2025-04-23 | Stop reason: HOSPADM

## 2025-04-20 RX ORDER — ATORVASTATIN CALCIUM 10 MG/1
20 TABLET, FILM COATED ORAL DAILY
Status: DISCONTINUED | OUTPATIENT
Start: 2025-04-21 | End: 2025-04-23 | Stop reason: HOSPADM

## 2025-04-20 RX ORDER — FUROSEMIDE 10 MG/ML
20 INJECTION INTRAMUSCULAR; INTRAVENOUS EVERY 6 HOURS
Status: DISCONTINUED | OUTPATIENT
Start: 2025-04-20 | End: 2025-04-23 | Stop reason: HOSPADM

## 2025-04-20 RX ORDER — INSULIN GLARGINE 100 [IU]/ML
20 INJECTION, SOLUTION SUBCUTANEOUS NIGHTLY
Status: DISCONTINUED | OUTPATIENT
Start: 2025-04-20 | End: 2025-04-23 | Stop reason: HOSPADM

## 2025-04-20 RX ORDER — SODIUM CHLORIDE, SODIUM LACTATE, POTASSIUM CHLORIDE, CALCIUM CHLORIDE 600; 310; 30; 20 MG/100ML; MG/100ML; MG/100ML; MG/100ML
INJECTION, SOLUTION INTRAVENOUS CONTINUOUS
Status: DISCONTINUED | OUTPATIENT
Start: 2025-04-20 | End: 2025-04-20

## 2025-04-20 RX ORDER — TAMSULOSIN HYDROCHLORIDE 0.4 MG/1
0.8 CAPSULE ORAL DAILY
Status: DISCONTINUED | OUTPATIENT
Start: 2025-04-21 | End: 2025-04-23 | Stop reason: HOSPADM

## 2025-04-20 RX ORDER — SENNOSIDES 8.6 MG/1
8.6 TABLET ORAL DAILY PRN
Status: DISCONTINUED | OUTPATIENT
Start: 2025-04-20 | End: 2025-04-23 | Stop reason: HOSPADM

## 2025-04-20 RX ORDER — URSODIOL 300 MG/1
300 CAPSULE ORAL 3 TIMES DAILY
Status: DISCONTINUED | OUTPATIENT
Start: 2025-04-20 | End: 2025-04-23 | Stop reason: HOSPADM

## 2025-04-20 RX ORDER — TAMSULOSIN HYDROCHLORIDE 0.4 MG/1
0.4 CAPSULE ORAL DAILY
Status: DISCONTINUED | OUTPATIENT
Start: 2025-04-20 | End: 2025-04-20

## 2025-04-20 RX ORDER — SODIUM BICARBONATE 650 MG/1
650 TABLET ORAL 3 TIMES DAILY
Status: DISCONTINUED | OUTPATIENT
Start: 2025-04-20 | End: 2025-04-23 | Stop reason: HOSPADM

## 2025-04-20 RX ORDER — FOLIC ACID 1 MG/1
1 TABLET ORAL DAILY
Status: DISCONTINUED | OUTPATIENT
Start: 2025-04-21 | End: 2025-04-23 | Stop reason: HOSPADM

## 2025-04-20 RX ORDER — SENNOSIDES 8.6 MG/1
8.6 TABLET ORAL DAILY
Status: DISCONTINUED | OUTPATIENT
Start: 2025-04-20 | End: 2025-04-20

## 2025-04-20 RX ORDER — DIVALPROEX SODIUM 125 MG/1
125 TABLET, DELAYED RELEASE ORAL NIGHTLY
Status: DISCONTINUED | OUTPATIENT
Start: 2025-04-20 | End: 2025-04-23 | Stop reason: HOSPADM

## 2025-04-20 RX ADMIN — PIPERACILLIN SODIUM AND TAZOBACTAM SODIUM 4.5 G: 4; .5 INJECTION, POWDER, LYOPHILIZED, FOR SOLUTION INTRAVENOUS at 01:04

## 2025-04-20 RX ADMIN — FUROSEMIDE 20 MG: 10 INJECTION, SOLUTION INTRAMUSCULAR; INTRAVENOUS at 05:04

## 2025-04-20 RX ADMIN — ARIPIPRAZOLE 5 MG: 5 TABLET ORAL at 09:04

## 2025-04-20 RX ADMIN — SODIUM CHLORIDE, POTASSIUM CHLORIDE, SODIUM LACTATE AND CALCIUM CHLORIDE: 600; 310; 30; 20 INJECTION, SOLUTION INTRAVENOUS at 03:04

## 2025-04-20 RX ADMIN — ASPIRIN 81 MG CHEWABLE TABLET 81 MG: 81 TABLET CHEWABLE at 09:04

## 2025-04-20 RX ADMIN — MUPIROCIN: 20 OINTMENT TOPICAL at 09:04

## 2025-04-20 RX ADMIN — SODIUM ZIRCONIUM CYCLOSILICATE 10 G: 10 POWDER, FOR SUSPENSION ORAL at 05:04

## 2025-04-20 RX ADMIN — TAMSULOSIN HYDROCHLORIDE 0.4 MG: 0.4 CAPSULE ORAL at 01:04

## 2025-04-20 RX ADMIN — IPRATROPIUM BROMIDE AND ALBUTEROL SULFATE 3 ML: 2.5; .5 SOLUTION RESPIRATORY (INHALATION) at 08:04

## 2025-04-20 RX ADMIN — SODIUM CHLORIDE, POTASSIUM CHLORIDE, SODIUM LACTATE AND CALCIUM CHLORIDE: 600; 310; 30; 20 INJECTION, SOLUTION INTRAVENOUS at 01:04

## 2025-04-20 RX ADMIN — INSULIN GLARGINE 20 UNITS: 100 INJECTION, SOLUTION SUBCUTANEOUS at 07:04

## 2025-04-20 RX ADMIN — PIPERACILLIN SODIUM AND TAZOBACTAM SODIUM 4.5 G: 4; .5 INJECTION, POWDER, LYOPHILIZED, FOR SOLUTION INTRAVENOUS at 09:04

## 2025-04-20 RX ADMIN — SODIUM CHLORIDE, POTASSIUM CHLORIDE, SODIUM LACTATE AND CALCIUM CHLORIDE: 600; 310; 30; 20 INJECTION, SOLUTION INTRAVENOUS at 09:04

## 2025-04-20 RX ADMIN — URSODIOL 300 MG: 300 CAPSULE ORAL at 07:04

## 2025-04-20 RX ADMIN — IPRATROPIUM BROMIDE AND ALBUTEROL SULFATE 3 ML: 2.5; .5 SOLUTION RESPIRATORY (INHALATION) at 02:04

## 2025-04-20 RX ADMIN — SODIUM BICARBONATE 650 MG TABLET 650 MG: at 08:04

## 2025-04-20 RX ADMIN — FUROSEMIDE 20 MG: 10 INJECTION, SOLUTION INTRAMUSCULAR; INTRAVENOUS at 11:04

## 2025-04-20 NOTE — PROGRESS NOTES
Pharmacokinetic Assessment Follow Up: IV Vancomycin    Vancomycin serum concentration assessment(s):  The random level was drawn correctly and can be used to guide therapy at this time. The measurement is within the desired definitive target range of 15 to 20 mcg/mL.    Vancomycin Regimen Plan:  Change regimen to Vancomycin 1000 mg IV every 24 hours with next serum trough concentration measured at 1700 prior to 1800 dose on 04/22    Drug levels (last 3 results):  Recent Labs   Lab Result Units 04/19/25  1823 04/20/25  0445   Vancomycin Random ug/ml  --  17.4   Vancomycin Trough ug/ml 28.3*  --        Vancomycin Administrations:  vancomycin given in the last 96 hours                     vancomycin (VANCOCIN) 1,000 mg in D5W 250 mL IVPB (admixture device) (mg) 1,000 mg New Bag 04/19/25 1849     1,000 mg New Bag  0617    vancomycin (VANCOCIN) 1,000 mg in D5W 250 mL IVPB (admixture device) (mg) 1,000 mg New Bag 04/18/25 2038    vancomycin (VANCOCIN) 1,000 mg in D5W 250 mL IVPB (admixture device) (mg) 1,000 mg New Bag 04/18/25 1856                    Pharmacy will continue to follow and monitor vancomycin.    Please contact pharmacy at extension [phone] for questions regarding this assessment.    Thank you for the consult,   Lauren Freedman       Patient brief summary:  Bacilio Arguelles is a 49 y.o. male initiated on antimicrobial therapy with IV Vancomycin for treatment of sepsis    Drug Allergies:   Review of patient's allergies indicates:  No Known Allergies    Actual Body Weight:  Wt Readings from Last 1 Encounters:   04/19/25 84.7 kg (186 lb 11.7 oz)       Renal Function:   Estimated Creatinine Clearance: 75.3 mL/min (based on SCr of 1.19 mg/dL).,     Dialysis Method (if applicable):  N/A    CBC (last 72 hours):  Recent Labs   Lab Result Units 04/18/25  0310 04/18/25  1509 04/20/25  0445   WBC x10(3)/mcL 6.33 6.23 6.17   Hgb g/dL 12.0* 11.9* 10.1*   Hct % 36.4* 37.3* 30.9*   Platelet x10(3)/mcL 121* 120* 127*   Mono  % % 5.4 5.1 6.5   Eos % % 3.3 0.2 1.5   Basophil % % 0.5 0.3 0.5       Metabolic Panel (last 72 hours):  Recent Labs   Lab Result Units 04/18/25  0318 04/18/25  1509 04/18/25  1714 04/18/25  1829 04/18/25  2130 04/19/25  0442 04/19/25  0817 04/19/25  1823 04/20/25  0445   Sodium mmol/L 142 141  --   --  139 138  --   --  136   Sodium, Blood Gas mmol/L  --   --  140 136*  --   --   --   --   --    Potassium mmol/L 4.0 4.4  --   --  4.1 4.0  --   --  5.2*   Potassium, Blood Gas mmol/L  --   --  4.0 4.0  --   --   --   --   --    Chloride mmol/L 113* 110*  --   --  109* 110*  --   --  110*   CO2 mmol/L 19* 13*  --   --  18* 20*  --   --  19*   Glucose mg/dL 118* 175*  --   --  229* 147*  --   --  118*   Glucose, UA   --   --   --   --   --   --  4+*  --   --    Blood Urea Nitrogen mg/dL 41.6* 48.1*  --   --  47.3* 39.7*  --   --  35.8*   Creatinine mg/dL 1.15 1.40*  --   --  1.53* 1.49*  --  1.36* 1.19   Albumin g/dL 1.8* 2.1*  --   --   --  1.7*  --   --  1.8*   Bilirubin Total mg/dL 0.9 1.2  --   --   --  1.9*  --   --  2.2*   ALP unit/L 77 90  --   --   --  89  --   --  85   AST unit/L 45 65*  --   --   --  61*  --   --  63*   ALT unit/L 21 27  --   --   --  30  --   --  37   Magnesium Level mg/dL 1.90  --   --   --   --  1.80  --   --  2.30   Phosphorus Level mg/dL 3.3  --   --   --   --  3.8  --   --  3.5       Microbiologic Results:  Microbiology Results (last 7 days)       Procedure Component Value Units Date/Time    Blood Culture [[id number]]  (Normal) Collected: 04/18/25 1645    Order Status: Completed Specimen: Blood Updated: 04/19/25 1801     Blood Culture No Growth At 24 Hours    Blood Culture [[id number]]  (Normal) Collected: 04/18/25 1645    Order Status: Completed Specimen: Blood Updated: 04/19/25 1801     Blood Culture No Growth At 24 Hours    Blood Culture [[id number]]  (Normal) Collected: 04/15/25 1637    Order Status: Completed Specimen: Blood Updated: 04/19/25 1801     Blood Culture No Growth At 96  Hours    Blood Culture [[id number]]  (Normal) Collected: 04/15/25 1637    Order Status: Completed Specimen: Blood Updated: 04/19/25 1801     Blood Culture No Growth At 96 Hours    Respiratory Culture [[id number]] Collected: 04/18/25 1705    Order Status: Completed Specimen: Sputum Updated: 04/19/25 0905     Respiratory Culture Rare normal respiratory katarina     GRAM STAIN Quality 2+      Many Gram positive cocci      Moderate Gram Positive Rods    Urine culture [[id number]] Collected: 04/15/25 1500    Order Status: Completed Specimen: Urine Updated: 04/17/25 0824     Urine Culture No Growth

## 2025-04-20 NOTE — PT/OT/SLP EVAL
[facility]  Speech Language Pathology Department  Clinical Swallow Evaluation    Patient Name:  Bacilio Arguelles   MRN:  [id number]    Recommendations     General recommendations:  SLP follow up x1 for diet tolerance  Solid texture recommendation:  Regular Diet - IDDSI Level 7  Liquid consistency recommendation: Thin liquids - IDDSI Level 0   Medications: per patient preference  Swallow strategies/precautions: small bites/sips, slow rate, upright for PO intake, and supervision with meals  Precautions:  falls    History     Bacilio Arguelles is a/n 49 y.o. male admitted to [facility] as transfer from outside facility for in hospital  cardiac arrest secondary to third-degree AV block, s/p transvenous pacemaker, acute respiratory failure secondary to third-degree AV block s/p intubation, CARMEL secondary to ATN. Pt intubated on 4/15/25, extubated on 4/18/2025      Past Medical History:   Diagnosis Date    Anxiety disorder, unspecified     Depression     DM (diabetes mellitus), type 2     GERD (gastroesophageal reflux disease)     HLD (hyperlipidemia)     Hypothyroidism, unspecified     Intellectual disability     Mood disorder     Seizure disorder      Past Surgical History:   Procedure Laterality Date    ARTHROPLASTY,HIP,TOTAL, BILATERAL, POSTERIOR APPROACH Bilateral      Prior Intubation HX:  4/15/25 to 4/18/2025    Home diet texture/consistency: Regular and thin liquids  Current method of nutrition: NPO    Patient complaint: I want pizza    Imaging   Results for orders placed during the hospital encounter of 04/15/25    X-Ray Chest 1 View    Narrative  EXAMINATION:  XR CHEST 1 VIEW    CLINICAL HISTORY:  tachypneic;    COMPARISON:  Yesterday    FINDINGS:  Frontal view of the chest was obtained. Interval removal endotracheal tube, enteric tube and right jugular catheter.  Heart is not significantly enlarged.  Suspect some mild atelectasis.  No pneumothorax.    Impression  Interval  extubation.  Suspect some atelectasis.      Electronically signed by: Sudeep Edgar  Date:    04/18/2025  Time:    14:07    No results found for this or any previous visit.    No results found for this or any previous visit.    Subjective     Patient calm and cooperative.    Patient goals: to eat/drink   Spiritual/Cultural/Sabianist Beliefs/Practices that affect care: no    Pain/Comfort: Pain Rating 1: 0/10    Respiratory Status:  3L via nasal cannula    Restraints/positioning devices: none    Objective     ORAL MUSCULATURE  Dentition: own teeth  Secretion Management: adequate  Mucosal Quality: good  Facial Movement: WFL  Buccal Strength & Mobility: WFL  Mandibular Strength & Mobility: WFL  Oral Labial Strength & Mobility: WFL  Lingual Strength & Mobility: WFL  Vocal Quality: hoarse  Volitional Cough: Weak      PO TRIALS  Consistency Fed By Oral Symptoms Pharyngeal Symptoms   Ice chips SLP None None   Puree SLP None None   Thin liquid by straw SLP None None   Chewable solid SLP None None     Assessment     Pt presented with no clinicial overt signs/sx of dysphagia. ST recs initiate PO diet. RN/MD notified     ST to f/u x1 for diet tolerance.    Outcome Measures     Functional Oral Intake Scale: 7 - Total oral diet with no restrictions    Goals     Multidisciplinary Problems       SLP Goals       Not on file                  Education     Patient provided with verbal education regarding POC.  Understanding was verbalized.    Plan     SLP Follow-Up:  Yes   Plan of Care reviewed with:  patient     Time Tracking     SLP Treatment Date:    4/20/25  Speech Start Time:  0830  Speech Stop Time:  0845  Speech Total Time (min): 15 min    Billable minutes:  Swallow and Oral Function Evaluation, 15 minutes     04/20/2025

## 2025-04-20 NOTE — PLAN OF CARE
Problem: Adult Inpatient Plan of Care  Goal: Plan of Care Review  Outcome: Progressing  Flowsheets (Taken 4/20/2025 1853)  Plan of Care Reviewed With: patient  Goal: Patient-Specific Goal (Individualized)  Outcome: Progressing  Flowsheets (Taken 4/20/2025 1853)  Individualized Care Needs: [place] care Q12, ACHS checks, encourage cough and deep breathing, encourage self care and ADL's  Anxieties, Fears or Concerns: na  Goal: Absence of Hospital-Acquired Illness or Injury  Outcome: Progressing  Intervention: Identify and Manage Fall Risk  Flowsheets (Taken 4/20/2025 1853)  Safety Promotion/Fall Prevention:   assistive device/personal item within reach   Fall Risk reviewed with patient/family   Fall Risk signage in place   pulse ox   room near unit station   /camera at bedside   lighting adjusted   medications reviewed  Intervention: Prevent Skin Injury  Flowsheets (Taken 4/20/2025 1853)  Body Position:   position changed independently   weight shifting  Skin Protection: drying agents applied  Device Skin Pressure Protection: absorbent pad utilized/changed  Intervention: Prevent and Manage VTE (Venous Thromboembolism) Risk  Flowsheets (Taken 4/20/2025 1853)  VTE Prevention/Management:   remove, assess skin, and reapply sequential compression device   ambulation promoted   ROM (active) performed   ROM (passive) performed   fluids promoted  Intervention: Prevent Infection  Flowsheets (Taken 4/20/2025 1853)  Infection Prevention:   environmental surveillance performed   equipment surfaces disinfected   hand hygiene promoted   personal protective equipment utilized   rest/sleep promoted   single patient room provided  Goal: Optimal Comfort and Wellbeing  Outcome: Progressing  Intervention: Monitor Pain and Promote Comfort  Flowsheets (Taken 4/20/2025 1853)  Pain Management Interventions:   care clustered   relaxation techniques promoted   quiet environment facilitated  Intervention: Provide Person-Centered  Care  Flowsheets (Taken 4/20/2025 1853)  Trust Relationship/Rapport:   care explained   choices provided   emotional support provided   empathic listening provided   questions answered   questions encouraged   reassurance provided   thoughts/feelings acknowledged  Goal: Readiness for Transition of Care  Outcome: Progressing     Problem: Delirium  Goal: Optimal Coping  Outcome: Progressing  Intervention: Optimize Psychosocial Adjustment to Delirium  Flowsheets (Taken 4/20/2025 1853)  Supportive Measures:   active listening utilized   self-responsibility promoted   verbalization of feelings encouraged   self-reflection promoted   self-care encouraged   relaxation techniques promoted   problem-solving facilitated   positive reinforcement provided  Family/Support System Care:   involvement promoted   presence promoted   self-care encouraged   support provided  Goal: Improved Behavioral Control  Outcome: Progressing  Intervention: Prevent and Manage Agitation  Flowsheets (Taken 4/20/2025 1853)  Environment Familiarity/Consistency:   daily routine followed   familiar objects from home provided   personal clothing/items utilized  Intervention: Minimize Safety Risk  Flowsheets (Taken 4/20/2025 1853)  Trust Relationship/Rapport:   care explained   choices provided   emotional support provided   empathic listening provided   questions answered   questions encouraged   reassurance provided   thoughts/feelings acknowledged  Enhanced Safety Measures:   room near unit station   monitored by video  Goal: Improved Attention and Thought Clarity  Outcome: Progressing  Goal: Improved Sleep  Outcome: Progressing     Problem: Skin Injury Risk Increased  Goal: Skin Health and Integrity  Outcome: Progressing     Problem: Infection  Goal: Absence of Infection Signs and Symptoms  Outcome: Progressing

## 2025-04-20 NOTE — H&P
[facility] Hospital Medicine History & Physical Examination       Patient Name: Bacilio Arguelles  MRN: [id number]  Patient Class: IP- Inpatient   Admission Date: 4/15/2025   Admitting Physician: [facility]   Length of Stay: 5  Attending Physician: Jayden Staton MD  Primary Care Provider: Malik Zavala MD  Face-to-Face encounter date: 04/20/2025  Code Status:Full code  Chief Complaint: No chief complaint on file.      Screening for Social Drivers for health:  Patient screened for food insecurity, housing instability, transportation needs, utility difficulties, and interpersonal safety (select all that apply as identified as concern)  []Housing or Food  []Transportation Needs  []Utility Difficulties  []Interpersonal safety  [x]None      Patient information was obtained from patient, patient's family, past medical records and ER records.  ED records were reviewed in detail and documented below    HISTORY OF PRESENT ILLNESS:   Bacilio Arguelles is a 49 y.o. male who  has a past medical history of Anxiety disorder, unspecified, Depression, DM (diabetes mellitus), type 2, GERD (gastroesophageal reflux disease), HLD (hyperlipidemia), Hypothyroidism, unspecified, Intellectual disability, Mood disorder, and Seizure disorder..     The patient presented to [facility] on 4/15/2025 from an outside facility as a transfer after going into cardiac arrest with ROSC after 2 minutes.  Patient was admitted to the ICU as he was brought in intubated.  History as per nurse mentions a syncopal episode which led to a fall and head trauma on 04/13.  At the time of EMS arrival heart rate was found to be 20 to 30s at [facility].  Shortly after patient was found to be in asystole and was coded for 1 round.  He was intubated and transferred into the ICU at that facility.  Patient was placed on temporary pacing.  Echo showed a EF of 30%.  Patient was transferred to our facility  to have a permanent  pacemaker however he started developing fevers so pacemaker placement got delayed.  Cultures found to be negative on 04/16.  Dual-chamber leadless ppm was placed on 04/17.  Extubated 4/18, however he continued to have fevers.  Repeat cultures taken,  Vanc and Zosyn started.  On 04/19 CTA chest was found to be negative for PE.    Today patient was found to be stable to be downgraded to hospital medicine.  Patient has been afebrile for the last 24 hours.  Requiring 2 L nasal cannula and satting at 95%.  Patient has been mobile, as per nurse and can perform basic ADLs on his own. Patient mentions that he needs help with showering and shaving but is able to use the toilet on his own.   On exam, generalized anasarca is noted.      Stop antibiotics, continue home meds, continue PT/OT, titrate oxygen requirements, and start Lasix 20 mg q.6 hours.  Daily labs. Continue to monitor.  Case management consulted to discharge back to residence.      PAST MEDICAL HISTORY:     Past Medical History:   Diagnosis Date    Anxiety disorder, unspecified     Depression     DM (diabetes mellitus), type 2     GERD (gastroesophageal reflux disease)     HLD (hyperlipidemia)     Hypothyroidism, unspecified     Intellectual disability     Mood disorder     Seizure disorder        PAST SURGICAL HISTORY:     Past Surgical History:   Procedure Laterality Date    ARTHROPLASTY,HIP,TOTAL, BILATERAL, POSTERIOR APPROACH Bilateral        ALLERGIES:   Patient has no known allergies.    FAMILY HISTORY:   Reviewed and negative    SOCIAL HISTORY:     Social History     Tobacco Use    Smoking status: Never    Smokeless tobacco: Never   Substance Use Topics    Alcohol use: Never        HOME MEDICATIONS:     Prior to Admission medications    Medication Sig Start Date End Date Taking? Authorizing Provider   alfuzosin (UROXATRAL) 10 mg Tb24 Take 10 mg by mouth daily with dinner or evening meal.    Provider, Historical   ARIPiprazole (ABILIFY) 5 MG Tab Take 5 mg  by mouth. 4/19/23   Provider, Historical   aspirin (ECOTRIN) 81 MG EC tablet Aspir-81 mg tablet,delayed release   Take 1 tablet every day by oral route.    Provider, Historical   atorvastatin (LIPITOR) 20 MG tablet atorvastatin 20 mg tablet    Provider, Historical   DAYVIGO 5 mg Tab Take 1 tablet by mouth. 4/19/23   Provider, Historical   divalproex (DEPAKOTE) 125 MG EC tablet Take 125 mg by mouth every evening.    Provider, Historical   fenofibrate (TRICOR) 145 MG tablet fenofibrate nanocrystallized 145 mg tablet    Provider, Historical   folic acid (FOLVITE) 1 MG tablet Take 1 tablet (1 mg total) by mouth once daily. 5/12/23 6/11/23  Alexa Cummings MD   furosemide (LASIX) 40 MG tablet Take 0.5 tablets (20 mg total) by mouth daily as needed (Swelling, shortness of breath, rapid weight gain of 2 lb overnight). 5/12/23   Alexa Cummings MD   glipiZIDE 5 MG TR24 Take 1 tablet (5 mg total) by mouth daily with breakfast. 5/12/23 6/11/23  Alexa Cummings MD   levothyroxine (SYNTHROID) 25 MCG tablet levothyroxine 25 mcg tablet    Provider, Historical   paliperidone palmitate (INVEGA SUSTENNA) 117 mg/0.75 mL Syrg injection Invega Sustenna 117 mg/0.75 mL intramuscular syringe    Provider, Historical   pantoprazole (PROTONIX) 40 MG tablet Take 40 mg by mouth. 4/24/23   Provider, Historical   propranolol HCl (PROPRANOLOL ORAL) Take 5 mg by mouth 2 (two) times a day.    Provider, Historical   sodium bicarbonate 650 MG tablet Take 1 tablet (650 mg total) by mouth 3 (three) times daily. for 7 days 5/12/23 5/19/23  Alexa Cummings MD   TRINTELLIX 20 mg Tab Take 1 tablet by mouth. 4/19/23   Provider, Historical   ursodioL (ACTIGALL) 300 mg capsule Take by mouth 3 (three) times daily. 4/19/23   Provider, Historical       REVIEW OF SYSTEMS:   Except as documented, all other systems reviewed and negative     PHYSICAL EXAM:     VITAL SIGNS: 24 HRS MIN & MAX LAST   Temp  Min: 97.4 °F (36.3 °C)  Max: 101 °F (38.3 °C) 98 °F (36.7 °C)    BP  Min: 77/51  Max: 142/72 (!) 131/90   Pulse  Min: 60  Max: 118  91   Resp  Min: 11  Max: 32 16   SpO2  Min: 93 %  Max: 100 % 97 %     Eyes: Conjunctivae nl. No scleral icterus.  ENT: Mucous membranes are moist. Oropharynx is clear.  Neck: Supple. No lymphadenopathy.  Transvenous pacemaker in the right internal jugular  Cardiovascular: Regular rate and rhythm. No murmurs, rubs, or gallops. Distal pulses are 2+ and symmetric.  Pulmonary/Chest: No respiratory distress. Clear to auscultation bilaterally. No wheezing, rales, or rhonchi.  Abdominal: Soft. Non-distended. No TTP. No rebound, guarding, or rigidity.   Musculoskeletal:  anasarca  Psych/mental status: Appropriate mood and affect.     LABS AND IMAGING:     Recent Labs   Lab 04/18/25 0310 04/18/25  1509 04/20/25  0445   WBC 6.33 6.23 6.17   RBC 3.65* 3.64* 3.08*   HGB 12.0* 11.9* 10.1*   HCT 36.4* 37.3* 30.9*   MCV 99.7* 102.5* 100.3*   MCH 32.9* 32.7* 32.8*   MCHC 33.0 31.9* 32.7*   RDW 14.9 15.3 15.1   * 120* 127*   MPV 9.6 9.0 10.2       Recent Labs   Lab 04/15/25  1547 04/16/25  0317 04/18/25  0318 04/18/25  1509 04/18/25  1714 04/18/25  1829 04/18/25  2130 04/19/25  0442 04/19/25  1823 04/20/25  0445   NA  --    < > 142 141  --   --  139 138  --  136   K  --    < > 4.0 4.4  --   --  4.1 4.0  --  5.2*   CL  --    < > 113* 110*  --   --  109* 110*  --  110*   CO2  --    < > 19* 13*  --   --  18* 20*  --  19*   BUN  --    < > 41.6* 48.1*  --   --  47.3* 39.7*  --  35.8*   CREATININE  --    < > 1.15 1.40*  --   --  1.53* 1.49* 1.36* 1.19   CALCIUM  --    < > 8.8 8.4  --   --  8.6 8.7  --  8.1*   PH 7.330*  --   --   --  7.390 7.390  --   --   --   --    MG  --    < > 1.90  --   --   --   --  1.80  --  2.30   ALBUMIN  --    < > 1.8* 2.1*  --   --   --  1.7*  --  1.8*   ALKPHOS  --    < > 77 90  --   --   --  89  --  85   ALT  --    < > 21 27  --   --   --  30  --  37   AST  --    < > 45 65*  --   --   --  61*  --  63*   BILITOT  --    < > 0.9 1.2  --    --   --  1.9*  --  2.2*    < > = values in this interval not displayed.       Microbiology Results (last 7 days)       Procedure Component Value Units Date/Time    Respiratory Culture [[id number]] Collected: 04/18/25 1705    Order Status: Completed Specimen: Sputum Updated: 04/20/25 1039     Respiratory Culture Normal respiratory katarina     GRAM STAIN Quality 2+      Many Gram positive cocci      Moderate Gram Positive Rods    Blood Culture [[id number]]  (Normal) Collected: 04/18/25 1645    Order Status: Completed Specimen: Blood Updated: 04/19/25 1801     Blood Culture No Growth At 24 Hours    Blood Culture [[id number]]  (Normal) Collected: 04/18/25 1645    Order Status: Completed Specimen: Blood Updated: 04/19/25 1801     Blood Culture No Growth At 24 Hours    Blood Culture [[id number]]  (Normal) Collected: 04/15/25 1637    Order Status: Completed Specimen: Blood Updated: 04/19/25 1801     Blood Culture No Growth At 96 Hours    Blood Culture [[id number]]  (Normal) Collected: 04/15/25 1637    Order Status: Completed Specimen: Blood Updated: 04/19/25 1801     Blood Culture No Growth At 96 Hours    Urine culture [[id number]] Collected: 04/15/25 1500    Order Status: Completed Specimen: Urine Updated: 04/17/25 0824     Urine Culture No Growth             CTA Chest Non-Coronary (PE Studies)  Narrative: EXAMINATION:  CTA CHEST NON CORONARY (PE STUDIES)    CLINICAL HISTORY:  Pulmonary embolism (PE) suspected, unknown D-dimer;complete heart block.  Tachypnea.    TECHNIQUE:  Helical acquisition through the chest with IV contrast targeting the pulmonary arteries. Multiplanar and 3D MIP reconstructed images were provided for review.   mGycm. Automatic exposure control, adjustment of mA/kV or iterative reconstruction technique was used to reduce radiation.    COMPARISON:  No prior chest CT.    FINDINGS:  There is good opacification of the pulmonary arterial tree. No pulmonary embolus seen.  Some pulmonary arteries are  obscured by motion.  There is no aortic dissection.    There is no mediastinal, hilar or axillary lymphadenopathy by size criteria.    Heart is normal in size. No pericardial effusion.    There is a small left pleural effusion.  There is bilateral dependent consolidation, left greater than right.    Suspect gallstones.  There is a small hiatal hernia.  Normal adrenals.  No acute osseous findings.  Impression: 1. Negative for pulmonary thromboembolic disease.  2. Bilateral dependent consolidation, left greater than right.  This at least partially relates to atelectasis but question superimposed infection.  3. Small left pleural effusion.    Electronically signed by: Sudeep Edgar  Date:    04/19/2025  Time:    08:27      ASSESSMENT & PLAN:     Assessment  Syncope  Fall and head trauma 4/13  Bradycardia  Episode of asystole and cardiac arrest with ROSC after 1 round of CPR  Third-degree AV block, s/p transvenous pacemaker.  CARMEL on CKD  Generalized anasarca  Other medical history includes type 2 diabetes, hypothyroidism, seizure disorder, intellectual disability, disruptive disorder, HLD, BPH        Plan     Wean oxygen requirements as tolerated   Continue with Lantus and SSI  Start Lasix 20 mg IV q.6 hours for fluid overload  Echo shows EF of 60-65 %  Keep Stafford catheter in place as patient has BPH and who was experiencing obstructive nephropathy  Continue tamsulosin high-dose daily  Continue home medications  Daily labs  Stop antibiotics as no growth noted on cultures  Continue PT/OT  Patient is only able to perform certain ADLs-needs help with showering and shaving      DVT Prophylaxis:  SCD  Patient condition:  Stable    __________________________________________________________________________  INPATIENT LIST OF MEDICATIONS     Scheduled Meds:   albuterol-ipratropium  3 mL Nebulization Q6H    ARIPiprazole  5 mg Per OG tube Daily    aspirin  81 mg Per OG tube Daily    [START ON 4/21/2025] atorvastatin  20 mg Oral  Daily    divalproex  125 mg Oral QHS    [START ON 4/21/2025] fenofibrate  145 mg Oral Daily    [START ON 4/21/2025] folic acid  1 mg Oral Daily    furosemide (LASIX) injection  20 mg Intravenous Q6H    [START ON 4/21/2025] levothyroxine  25 mcg Oral Before breakfast    sodium bicarbonate  650 mg Oral TID    tamsulosin  0.4 mg Oral Daily    ursodioL  300 mg Oral TID    [START ON 4/21/2025] vortioxetine  1 tablet Oral Daily     Continuous Infusions:  PRN Meds:.  Current Facility-Administered Medications:     acetaminophen, 650 mg, Oral, Q4H PRN    dextrose 50%, 12.5 g, Intravenous, PRN    glucagon (human recombinant), 1 mg, Intramuscular, PRN    hydrALAZINE, 10 mg, Intravenous, Q4H PRN    insulin aspart U-100, 0-5 Units, Subcutaneous, Q6H PRN    senna, 8.6 mg, Oral, Daily PRN    sodium chloride 0.9%, 10 mL, Intravenous, PRN    sodium chloride 0.9%, 3 mL, Nebulization, PRN      Jayden Staton MD   04/20/2025

## 2025-04-20 NOTE — PROGRESS NOTES
[facility] - 7th Floor ICU  Pulmonary Critical Care Note    Patient Name: Bacilio Arguelles  MRN: [id number]  Admission Date: 4/15/2025  Hospital Length of Stay: 5 days  Code Status: Full Code  Attending Provider: Nj Becerra MD  Primary Care Provider: Malik Zavala MD     Subjective:     HPI:   Fortunately old male with past medical history of non-insulin-dependent type 2 diabetes mellitus, hypothyroidism, seizure disorder, unspecified psychiatric disorder, intellectual disability transferred from [facility] for permanent pacemaker placement.  Patient passed out and was unresponsive for 2 minutes at group home, with heart rate ranging between 20s- 30s, given atropine, in the ER he was found to be in complete heart block, later went into asystole, ROSC achieved 2 minutes post CPR.  Immediately taken to cath lab and had a transvenous pacemaker placed and then admitted to ICU.  Per chart review, not on any AV germania blocking agents, no reversible causes identified, transferred to evaluate/need for permanent pacemaker placement.  No history is obtainable at this time as patient is intubated and sedated.      Past 24 hours:  Patient on nasal cannula oxygen.  He is awake and alert.  Hemodynamically stable and paced.    Past Medical History:   Diagnosis Date    Anxiety disorder, unspecified     Depression     DM (diabetes mellitus), type 2     GERD (gastroesophageal reflux disease)     HLD (hyperlipidemia)     Hypothyroidism, unspecified     Intellectual disability     Mood disorder     Seizure disorder        Past Surgical History:   Procedure Laterality Date    ARTHROPLASTY,HIP,TOTAL, BILATERAL, POSTERIOR APPROACH Bilateral        Social History[1]        Current Outpatient Medications   Medication Instructions    alfuzosin (UROXATRAL) 10 mg, Oral, With dinner    ARIPiprazole (ABILIFY) 5 mg, Oral    aspirin (ECOTRIN) 81 MG EC tablet Aspir-81 mg tablet,delayed release   Take 1  tablet every day by oral route.    atorvastatin (LIPITOR) 20 MG tablet atorvastatin 20 mg tablet    DAYVIGO 5 mg Tab 1 tablet, Oral    divalproex (DEPAKOTE) 125 mg, Oral, Nightly    fenofibrate (TRICOR) 145 MG tablet fenofibrate nanocrystallized 145 mg tablet    folic acid (FOLVITE) 1 mg, Oral, Daily    furosemide (LASIX) 20 mg, Oral, Daily PRN    glipiZIDE 5 mg, Oral, With breakfast    levothyroxine (SYNTHROID) 25 MCG tablet levothyroxine 25 mcg tablet    paliperidone palmitate (INVEGA SUSTENNA) 117 mg/0.75 mL Syrg injection Invega Sustenna 117 mg/0.75 mL intramuscular syringe    pantoprazole (PROTONIX) 40 mg, Oral    propranolol HCl (PROPRANOLOL ORAL) 5 mg, Oral, 2 times daily    sodium bicarbonate 650 mg, Oral, 3 times daily    TRINTELLIX 20 mg Tab 1 tablet, Oral    ursodioL (ACTIGALL) 300 mg capsule Oral, 3 times daily       Review of patient's allergies indicates:  No Known Allergies     Current Inpatient Medications   albuterol-ipratropium  3 mL Nebulization Q6H    ARIPiprazole  5 mg Per OG tube Daily    aspirin  81 mg Per OG tube Daily    piperacillin-tazobactam (Zosyn) IV (PEDS and ADULTS) (extended infusion is not appropriate)  4.5 g Intravenous Q8H    senna  8.6 mg Oral Daily    vancomycin (VANCOCIN) 1,000 mg in D5W 250 mL IVPB (admixture device)  1,000 mg Intravenous Q24H       Current Intravenous Infusions   dexmedeTOMIDine (Precedex) infusion (titrating)  0-1.4 mcg/kg/hr Intravenous Continuous   Stopped at 04/20/25 0702    lactated ringers   Intravenous Continuous 75 mL/hr at 04/20/25 0929 New Bag at 04/20/25 0929    propofoL  0-50 mcg/kg/min Intravenous Continuous   Stopped at 04/18/25 0858            Unable to do review of systems as patient is intubated and sedated.    Objective:       Intake/Output Summary (Last 24 hours) at 4/20/2025 0957  Last data filed at 4/20/2025 0701  Gross per 24 hour   Intake 2778.04 ml   Output 2450 ml   Net 328.04 ml         Vital Signs (Most Recent):  Temp: 97.4 °F (36.3  °C) (04/20/25 0701)  Pulse: 78 (04/20/25 0803)  Resp: 18 (04/20/25 0803)  BP: 138/80 (04/20/25 0803)  SpO2: 98 % (04/20/25 0803)  Body mass index is 32.05 kg/m².  Weight: 84.7 kg (186 lb 11.7 oz) Vital Signs (24h Range):  Temp:  [97.4 °F (36.3 °C)-101 °F (38.3 °C)] 97.4 °F (36.3 °C)  Pulse:  [] 78  Resp:  [14-36] 18  SpO2:  [91 %-100 %] 98 %  BP: ()/(51-93) 138/80     Physical Exam  Vital signs and nursing notes reviewed.  Constitutional: NAD.    Head: Atraumatic. Normocephalic.  Eyes: Conjunctivae nl. No scleral icterus.  ENT: Mucous membranes are moist. Oropharynx is clear.  Neck: Supple. No lymphadenopathy.  Transvenous pacemaker in the right internal jugular  Cardiovascular: Regular rate and rhythm. No murmurs, rubs, or gallops. Distal pulses are 2+ and symmetric.  Pulmonary/Chest: No respiratory distress. Clear to auscultation bilaterally. No wheezing, rales, or rhonchi.  Abdominal: Soft. Non-distended. No TTP. No rebound, guarding, or rigidity.   Musculoskeletal:  Trace bilateral pedal edema.  Skin: Warm and dry.  Neurological:  Full range of motion    Lines/Drains/Airways       Drain  Duration                  Urethral Catheter 04/15/25 2000 4 days              Peripheral Intravenous Line  Duration                  Peripheral IV - Single Lumen 04/18/25 0215 20 G 2 in Anterior;Right Upper Arm 2 days         Peripheral IV - Single Lumen 04/18/25 1804 18 G Anterior;Left;Proximal Forearm 1 day                    Significant Labs:    Lab Results   Component Value Date    WBC 6.17 04/20/2025    HGB 10.1 (L) 04/20/2025    HCT 30.9 (L) 04/20/2025    .3 (H) 04/20/2025     (L) 04/20/2025           BMP  Lab Results   Component Value Date     04/20/2025    K 5.2 (H) 04/20/2025    CO2 19 (L) 04/20/2025    BUN 35.8 (H) 04/20/2025    CREATININE 1.19 04/20/2025    CALCIUM 8.1 (L) 04/20/2025    AGAP 7.0 04/20/2025         ABG  Recent Labs   Lab 04/18/25  1829   PH 7.390   PO2 189.0*   PCO2  30.0*   HCO3 18.2*   POCBASEDEF -5.80*       Mechanical Ventilation Support:  Vent Mode: A/C (04/18/25 0745)  Ventilator Initiated: Yes (04/15/25 1125)  Set Rate: 18 BPM (04/18/25 0745)  Vt Set: 450 mL (04/18/25 0745)  Pressure Support: 10 cmH20 (04/16/25 0551)  PEEP/CPAP: 5 cmH20 (04/18/25 0745)  Oxygen Concentration (%): 30 (04/20/25 0701)  Peak Airway Pressure: 34 cmH20 (04/18/25 0745)  Total Ve: 7.1 L/m (04/18/25 0745)  F/VT Ratio<105 (RSBI): (!) 47.5 (04/18/25 0526)      Significant Imaging:  CTA shows no embolism.  There is bilateral atelectasis lower lobe consolidation with tiny effusions.        Assessment/Plan:     Assessment  In-hospital (IMC) cardiac arrest secondary to third-degree AV block, s/p transvenous pacemaker.  Acute respiratory failure secondary to third-degree AV block s/p intubation  CARMEL secondary to ATN  Non-insulin-dependent diabetes mellitus type 2      Plan    Wean oxygen as tolerated   Insulin sliding scale  We will add albuterol nebs do help with ventilation  Continue broad-spectrum antibiotic and adjust accordingly  Likely can downgrade out of ICU today.    DVT Prophylaxis:  SCD  GI Prophylaxis:  None          Reynaldo Orellana MD  Pulmonary Critical Care Medicine  [facility] - 7th Floor ICU  DOS: 04/20/2025           [1]   Social History  Socioeconomic History    Marital status: Single   Tobacco Use    Smoking status: Never    Smokeless tobacco: Never   Substance and Sexual Activity    Alcohol use: Never    Drug use: Never    Sexual activity: Not Currently     Social Drivers of Health     Financial Resource Strain: Patient Unable To Answer (4/18/2025)    Overall Financial Resource Strain (CARDIA)     Difficulty of Paying Living Expenses: Patient unable to answer   Food Insecurity: Patient Unable To Answer (4/18/2025)    Hunger Vital Sign     Worried About Running Out of Food in the Last Year: Patient unable to answer     Ran Out of Food in the Last Year: Patient unable to  answer   Transportation Needs: Patient Unable To Answer (4/18/2025)    PRAPARE - Transportation     Lack of Transportation (Medical): Patient unable to answer     Lack of Transportation (Non-Medical): Patient unable to answer   Stress: Patient Unable To Answer (4/18/2025)    [employer] [place] [employer] - Occupational Stress Questionnaire     Feeling of Stress : Patient unable to answer   Housing Stability: Patient Unable To Answer (4/18/2025)    Housing Stability Vital Sign     Unable to Pay for Housing in the Last Year: Patient unable to answer     Homeless in the Last Year: Patient unable to answer

## 2025-04-20 NOTE — PT/OT/SLP PROGRESS
Physical Therapy Treatment    Patient Name:  Bacilio Arguelles   MRN:  [id number]    Recommendations:     Discharge therapy intensity: Moderate Intensity Therapy   Discharge Equipment Recommendations: to be determined by next level of care  Barriers to discharge: Impaired mobility and Ongoing medical needs    Assessment:     Bacilio Arguelles is a 49 y.o. male admitted with a medical diagnosis of cardiac arrest; syncope due to high grade AV block s/p TVP and now s/p leadless PPM. Of note, pt with PMH of seizure, psychological disorder and intellectual disability.  He presents with the following impairments/functional limitations: weakness, gait instability, impaired endurance, impaired balance, impaired self care skills, impaired functional mobility, decreased safety awareness, impaired cardiopulmonary response to activity.    Pt sitting up in bed, requesting to get to the restroom urgently for BM, RN present and assisted t/o session as well. Tolerated ambulation well to restroom, improved safety awareness and ability to follow commands.     Rehab Prognosis: Good; patient would benefit from acute skilled PT services to address these deficits and reach maximum level of function.    Recent Surgery: Procedure(s) (LRB):  [id number] (N/A) 3 Days Post-Op    Plan:     During this hospitalization, patient would benefit from acute PT services 5 x/week to address the identified rehab impairments via gait training, therapeutic activities, therapeutic exercises and progress toward the following goals:    Plan of Care Expires:  05/18/25    Subjective     Chief Complaint: need to have a BM  Patient/Family Comments/goals: to get better  Pain/Comfort:  Pain Rating 1: 0/10      Objective:     Communicated with RN prior to session.  Patient found HOB elevated with blood pressure cuff, martinez catheter, pulse ox (continuous), telemetry, SCD, oxygen, peripheral IV upon PT entry to room.     General Precautions:  Standard, fall  Orthopedic Precautions: N/A  Braces: N/A  Respiratory Status: Room air vs NC on 2L/min  Blood Pressure: 130/81 pre tx then 127/74 after mobilizing to restroom; -113, Spo2 90-92% on RA  Skin Integrity: Visible skin intact      Functional Mobility:  Bed Mobility:    Supine to sit: Mod assist  Transfers:    Sit<->stand: Mod assist  Gait: 10'x2 trials with RW, Min assist for balance and RW management, no major LOB but minimally unsteady     Therapeutic Activities/Exercises:      Education:  Patient provided with verbal education education regarding PT role/goals/POC and fall prevention.  Additional teaching is warranted.     Patient left up in chair with all lines intact, call button in reach, chair alarm on, re pad in place, and RN present    GOALS:   Multidisciplinary Problems       Physical Therapy Goals          Problem: Physical Therapy    Goal Priority Disciplines Outcome Interventions   Physical Therapy Goal     PT, PT/OT Progressing    Description: Goals to be met by: 25     Patient will increase functional independence with mobility by performin. Supine to sit with Stand-by Assistance  2. Sit to supine with Stand-by Assistance  3. Sit to stand transfer with Stand-by Assistance  4. Gait  x 150 feet with Stand-by Assistance using Rolling Walker vs no AD.                          Time Tracking:     PT Received On: 25  PT Start Time: 1040     PT Stop Time: 1110  PT Total Time (min): 30 min     Billable Minutes: Therapeutic Activity 2 units    Treatment Type: Treatment  PT/PTA: PTA     Number of PTA visits since last PT visit: 2025

## 2025-04-21 PROBLEM — I44.2 COMPLETE HEART BLOCK: Status: ACTIVE | Noted: 2025-04-21

## 2025-04-21 PROBLEM — I45.9 HEART BLOCK: Status: ACTIVE | Noted: 2025-04-21

## 2025-04-21 LAB
ANION GAP SERPL CALC-SCNC: 9 MEQ/L
BASOPHILS # BLD AUTO: 0.04 X10(3)/MCL
BASOPHILS NFR BLD AUTO: 0.7 %
BUN SERPL-MCNC: 32.3 MG/DL (ref 8.9–20.6)
CALCIUM SERPL-MCNC: 8.4 MG/DL (ref 8.4–10.2)
CHLORIDE SERPL-SCNC: 106 MMOL/L (ref 98–107)
CO2 SERPL-SCNC: 25 MMOL/L (ref 22–29)
CREAT SERPL-MCNC: 1.41 MG/DL (ref 0.72–1.25)
CREAT/UREA NIT SERPL: 23
EOSINOPHIL # BLD AUTO: 0.16 X10(3)/MCL (ref 0–0.9)
EOSINOPHIL NFR BLD AUTO: 3 %
ERYTHROCYTE [DISTWIDTH] IN BLOOD BY AUTOMATED COUNT: 14.7 % (ref 11.5–17)
GFR SERPLBLD CREATININE-BSD FMLA CKD-EPI: >60 ML/MIN/1.73/M2
GLUCOSE SERPL-MCNC: 146 MG/DL (ref 74–100)
HCT VFR BLD AUTO: 31.4 % (ref 42–52)
HGB BLD-MCNC: 9.9 G/DL (ref 14–18)
IMM GRANULOCYTES # BLD AUTO: 0.02 X10(3)/MCL (ref 0–0.04)
IMM GRANULOCYTES NFR BLD AUTO: 0.4 %
LYMPHOCYTES # BLD AUTO: 1.34 X10(3)/MCL (ref 0.6–4.6)
LYMPHOCYTES NFR BLD AUTO: 25 %
MAGNESIUM SERPL-MCNC: 1.8 MG/DL (ref 1.6–2.6)
MCH RBC QN AUTO: 32.5 PG (ref 27–31)
MCHC RBC AUTO-ENTMCNC: 31.5 G/DL (ref 33–36)
MCV RBC AUTO: 103 FL (ref 80–94)
MONOCYTES # BLD AUTO: 0.42 X10(3)/MCL (ref 0.1–1.3)
MONOCYTES NFR BLD AUTO: 7.8 %
NEUTROPHILS # BLD AUTO: 3.38 X10(3)/MCL (ref 2.1–9.2)
NEUTROPHILS NFR BLD AUTO: 63.1 %
NRBC BLD AUTO-RTO: 0 %
PHOSPHATE SERPL-MCNC: 3.2 MG/DL (ref 2.3–4.7)
PLATELET # BLD AUTO: 167 X10(3)/MCL (ref 130–400)
PMV BLD AUTO: 8.9 FL (ref 7.4–10.4)
POCT GLUCOSE: 150 MG/DL (ref 70–110)
POCT GLUCOSE: 185 MG/DL (ref 70–110)
POCT GLUCOSE: 203 MG/DL (ref 70–110)
POTASSIUM SERPL-SCNC: 3.5 MMOL/L (ref 3.5–5.1)
RBC # BLD AUTO: 3.05 X10(6)/MCL (ref 4.7–6.1)
SODIUM SERPL-SCNC: 140 MMOL/L (ref 136–145)
WBC # BLD AUTO: 5.36 X10(3)/MCL (ref 4.5–11.5)

## 2025-04-21 PROCEDURE — 94640 AIRWAY INHALATION TREATMENT: CPT

## 2025-04-21 PROCEDURE — 25000003 PHARM REV CODE 250: Performed by: INTERNAL MEDICINE

## 2025-04-21 PROCEDURE — 99900035 HC TECH TIME PER 15 MIN (STAT)

## 2025-04-21 PROCEDURE — 97535 SELF CARE MNGMENT TRAINING: CPT

## 2025-04-21 PROCEDURE — 85025 COMPLETE CBC W/AUTO DIFF WBC: CPT | Performed by: STUDENT IN AN ORGANIZED HEALTH CARE EDUCATION/TRAINING PROGRAM

## 2025-04-21 PROCEDURE — 99900031 HC PATIENT EDUCATION (STAT)

## 2025-04-21 PROCEDURE — 27000221 HC OXYGEN, UP TO 24 HOURS

## 2025-04-21 PROCEDURE — 25000003 PHARM REV CODE 250: Performed by: STUDENT IN AN ORGANIZED HEALTH CARE EDUCATION/TRAINING PROGRAM

## 2025-04-21 PROCEDURE — 63600175 PHARM REV CODE 636 W HCPCS: Performed by: INTERNAL MEDICINE

## 2025-04-21 PROCEDURE — 84100 ASSAY OF PHOSPHORUS: CPT | Performed by: INTERNAL MEDICINE

## 2025-04-21 PROCEDURE — 80048 BASIC METABOLIC PNL TOTAL CA: CPT | Performed by: STUDENT IN AN ORGANIZED HEALTH CARE EDUCATION/TRAINING PROGRAM

## 2025-04-21 PROCEDURE — 36415 COLL VENOUS BLD VENIPUNCTURE: CPT | Performed by: STUDENT IN AN ORGANIZED HEALTH CARE EDUCATION/TRAINING PROGRAM

## 2025-04-21 PROCEDURE — 97530 THERAPEUTIC ACTIVITIES: CPT | Mod: CQ

## 2025-04-21 PROCEDURE — 63600175 PHARM REV CODE 636 W HCPCS: Performed by: STUDENT IN AN ORGANIZED HEALTH CARE EDUCATION/TRAINING PROGRAM

## 2025-04-21 PROCEDURE — 25000242 PHARM REV CODE 250 ALT 637 W/ HCPCS: Performed by: HOSPITALIST

## 2025-04-21 PROCEDURE — 11000001 HC ACUTE MED/SURG PRIVATE ROOM

## 2025-04-21 PROCEDURE — 94760 N-INVAS EAR/PLS OXIMETRY 1: CPT

## 2025-04-21 PROCEDURE — 83735 ASSAY OF MAGNESIUM: CPT | Performed by: INTERNAL MEDICINE

## 2025-04-21 RX ADMIN — FUROSEMIDE 20 MG: 10 INJECTION, SOLUTION INTRAMUSCULAR; INTRAVENOUS at 05:04

## 2025-04-21 RX ADMIN — LEVOTHYROXINE SODIUM 25 MCG: 0.03 TABLET ORAL at 05:04

## 2025-04-21 RX ADMIN — FOLIC ACID 1 MG: 1 TABLET ORAL at 09:04

## 2025-04-21 RX ADMIN — ASPIRIN 81 MG CHEWABLE TABLET 81 MG: 81 TABLET CHEWABLE at 09:04

## 2025-04-21 RX ADMIN — INSULIN ASPART 2 UNITS: 100 INJECTION, SOLUTION INTRAVENOUS; SUBCUTANEOUS at 11:04

## 2025-04-21 RX ADMIN — TAMSULOSIN HYDROCHLORIDE 0.8 MG: 0.4 CAPSULE ORAL at 09:04

## 2025-04-21 RX ADMIN — IPRATROPIUM BROMIDE AND ALBUTEROL SULFATE 3 ML: 2.5; .5 SOLUTION RESPIRATORY (INHALATION) at 01:04

## 2025-04-21 RX ADMIN — FUROSEMIDE 20 MG: 10 INJECTION, SOLUTION INTRAMUSCULAR; INTRAVENOUS at 11:04

## 2025-04-21 RX ADMIN — ATORVASTATIN CALCIUM 20 MG: 10 TABLET, FILM COATED ORAL at 09:04

## 2025-04-21 RX ADMIN — ARIPIPRAZOLE 5 MG: 5 TABLET ORAL at 09:04

## 2025-04-21 RX ADMIN — IPRATROPIUM BROMIDE AND ALBUTEROL SULFATE 3 ML: 2.5; .5 SOLUTION RESPIRATORY (INHALATION) at 08:04

## 2025-04-21 RX ADMIN — SODIUM BICARBONATE 650 MG TABLET 650 MG: at 09:04

## 2025-04-21 RX ADMIN — IPRATROPIUM BROMIDE AND ALBUTEROL SULFATE 3 ML: 2.5; .5 SOLUTION RESPIRATORY (INHALATION) at 07:04

## 2025-04-21 RX ADMIN — URSODIOL 300 MG: 300 CAPSULE ORAL at 02:04

## 2025-04-21 RX ADMIN — FENOFIBRATE 145 MG: 145 TABLET, FILM COATED ORAL at 09:04

## 2025-04-21 RX ADMIN — URSODIOL 300 MG: 300 CAPSULE ORAL at 09:04

## 2025-04-21 RX ADMIN — SODIUM BICARBONATE 650 MG TABLET 650 MG: at 02:04

## 2025-04-21 RX ADMIN — INSULIN GLARGINE 20 UNITS: 100 INJECTION, SOLUTION SUBCUTANEOUS at 11:04

## 2025-04-21 NOTE — PT/OT/SLP DISCHARGE
[facility]  Speech Language Pathology Department  Discharge Summary    Patient Name:  Bacilio Arguelles   MRN:  [id number]    Recommendations     General recommendations: SLP intervention not indicated  Solid texture recommendation:  Regular Diet - IDDSI Level 7  Liquid consistency recommendation: Thin liquids - IDDSI Level 0   Medications: per patient preference    Pt tolerating diet without clinical signs/sx of aspiration. SLP to sign off. Please reconsult as warranted.

## 2025-04-21 NOTE — PROGRESS NOTES
[facility] Hospital Medicine Progress Note        Chief Complaint: Inpatient Follow-up for     HPI:   Bacilio Arguelles is a 49 y.o. male who  has a past medical history of Anxiety disorder, unspecified, Depression, DM (diabetes mellitus), type 2, GERD (gastroesophageal reflux disease), HLD (hyperlipidemia), Hypothyroidism, unspecified, Intellectual disability, Mood disorder, and Seizure disorder..      The patient presented to [facility] on 4/15/2025 from an outside facility as a transfer after going into cardiac arrest with ROSC after 2 minutes.  Patient was admitted to the ICU as he was brought in intubated.  History as per nurse mentions a syncopal episode which led to a fall and head trauma on 04/13.  At the time of EMS arrival heart rate was found to be 20 to 30s at [facility].  Shortly after patient was found to be in asystole and was coded for 1 round.  He was intubated and transferred into the ICU at that facility.  Patient was placed on temporary pacing.  Echo showed a EF of 30%.  Patient was transferred to our facility  to have a permanent pacemaker however he started developing fevers so pacemaker placement got delayed.  Cultures found to be negative on 04/16.  Dual-chamber leadless ppm was placed on 04/17.  Extubated 4/18, however he continued to have fevers.  Repeat cultures taken,  Vanc and Zosyn started.  On 04/19 CTA chest was found to be negative for PE.     Today patient was found to be stable to be downgraded to hospital medicine.  Patient has been afebrile for the last 24 hours.  Requiring 2 L nasal cannula and satting at 95%.  Patient has been mobile, as per nurse and can perform basic ADLs on his own. Patient mentions that he needs help with showering and shaving but is able to use the toilet on his own.   On exam, generalized anasarca is noted.       Stop antibiotics, continue home meds, continue PT/OT, titrate oxygen requirements, and start Lasix 20  mg q.6 hours.  Daily labs. Continue to monitor.  Case management consulted to discharge back to residence.    Interval Hx:   Patient was seen and evaluated at bedside, oxygenating well on room air.  Patient is still noted to be significantly swollen over his lower extremities and upper extremities.  Continue Lasix and continue to monitor at this time.    Case was discussed with patient's nurse and  on the floor.    Objective/physical exam:  Eyes: Conjunctivae nl. No scleral icterus.  ENT: Mucous membranes are moist. Oropharynx is clear.  Neck: Supple. No lymphadenopathy.  Transvenous pacemaker in the right internal jugular  Cardiovascular: Regular rate and rhythm. No murmurs, rubs, or gallops. Distal pulses are 2+ and symmetric.  Pulmonary/Chest: No respiratory distress. Clear to auscultation bilaterally. No wheezing, rales, or rhonchi.  Abdominal: Soft. Non-distended. No TTP. No rebound, guarding, or rigidity.   Musculoskeletal:  anasarca  Psych/mental status: Appropriate mood and affect.        VITAL SIGNS: 24 HRS MIN & MAX LAST   Temp  Min: 97.4 °F (36.3 °C)  Max: 98.7 °F (37.1 °C) 98.5 °F (36.9 °C)   BP  Min: 116/77  Max: 143/93 117/80   Pulse  Min: 79  Max: 104  82   Resp  Min: 15  Max: 23 18   SpO2  Min: 90 %  Max: 97 % (!) 90 %     I have reviewed the following labs:  Recent Labs   Lab 04/18/25  1509 04/20/25  0445 04/21/25  0501   WBC 6.23 6.17 5.36   RBC 3.64* 3.08* 3.05*   HGB 11.9* 10.1* 9.9*   HCT 37.3* 30.9* 31.4*   .5* 100.3* 103.0*   MCH 32.7* 32.8* 32.5*   MCHC 31.9* 32.7* 31.5*   RDW 15.3 15.1 14.7   * 127* 167   MPV 9.0 10.2 8.9     Recent Labs   Lab 04/15/25  1547 04/16/25  0317 04/18/25  1509 04/18/25  1714 04/18/25  1829 04/18/25 2130 04/19/25 0442 04/19/25  1823 04/20/25 0445 04/21/25  0501   NA  --    < > 141  --   --    < > 138  --  136 140   K  --    < > 4.4  --   --    < > 4.0  --  5.2* 3.5   CL  --    < > 110*  --   --    < > 110*  --  110* 106   CO2  --    < >  13*  --   --    < > 20*  --  19* 25   BUN  --    < > 48.1*  --   --    < > 39.7*  --  35.8* 32.3*   CREATININE  --    < > 1.40*  --   --    < > 1.49* 1.36* 1.19 1.41*   CALCIUM  --    < > 8.4  --   --    < > 8.7  --  8.1* 8.4   PH 7.330*  --   --  7.390 7.390  --   --   --   --   --    MG  --    < >  --   --   --   --  1.80  --  2.30 1.80   ALBUMIN  --    < > 2.1*  --   --   --  1.7*  --  1.8*  --    ALKPHOS  --    < > 90  --   --   --  89  --  85  --    ALT  --    < > 27  --   --   --  30  --  37  --    AST  --    < > 65*  --   --   --  61*  --  63*  --    BILITOT  --    < > 1.2  --   --   --  1.9*  --  2.2*  --     < > = values in this interval not displayed.     Microbiology Results (last 7 days)       Procedure Component Value Units Date/Time    Blood Culture [[id number]]  (Normal) Collected: 04/15/25 1637    Order Status: Completed Specimen: Blood Updated: 04/20/25 1802     Blood Culture No Growth at 5 days    Blood Culture [[id number]]  (Normal) Collected: 04/15/25 1637    Order Status: Completed Specimen: Blood Updated: 04/20/25 1802     Blood Culture No Growth at 5 days    Blood Culture [[id number]]  (Normal) Collected: 04/18/25 1645    Order Status: Completed Specimen: Blood Updated: 04/20/25 1801     Blood Culture No Growth At 48 Hours    Blood Culture [[id number]]  (Normal) Collected: 04/18/25 1645    Order Status: Completed Specimen: Blood Updated: 04/20/25 1801     Blood Culture No Growth At 48 Hours    Respiratory Culture [[id number]] Collected: 04/18/25 1705    Order Status: Completed Specimen: Sputum Updated: 04/20/25 1039     Respiratory Culture Normal respiratory katarina     GRAM STAIN Quality 2+      Many Gram positive cocci      Moderate Gram Positive Rods    Urine culture [[id number]] Collected: 04/15/25 1500    Order Status: Completed Specimen: Urine Updated: 04/17/25 0824     Urine Culture No Growth             See below for Radiology    Assessment/Plan:  Assessment  Syncope  Fall and head  trauma 4/13  Bradycardia  Episode of asystole and cardiac arrest with ROSC after 1 round of CPR  Third-degree AV block, s/p transvenous pacemaker.  CARMEL on CKD  Generalized anasarca  Other medical history includes type 2 diabetes, hypothyroidism, seizure disorder, intellectual disability, disruptive disorder, HLD, BPH        Plan     Wean oxygen requirements as tolerated   Continue with Lantus and SSI  Start Lasix 20 mg IV q.6 hours for fluid overload  Echo shows EF of 60-65 %  Keep Stafford catheter in place as patient has BPH and who was experiencing obstructive nephropathy  Continue tamsulosin high-dose daily  Continue home medications  Daily labs  Stop antibiotics as no growth noted on cultures  Continue PT/OT  Patient is only able to perform certain ADLs-needs help with showering and shaving        DVT Prophylaxis:  SCD  Patient condition:  Stable    VTE prophylaxis:     Patient condition:  Stable/Fair/Guarded/ Serious/ Critical    Anticipated discharge and Disposition:         All diagnosis and differential diagnosis have been reviewed; assessment and plan has been documented; I have personally reviewed the labs and test results that are presently available; I have reviewed the patients medication list; I have reviewed the consulting providers response and recommendations. I have reviewed or attempted to review medical records based upon their availability    All of the patient's questions have been  addressed and answered. Patient's is agreeable to the above stated plan. I will continue to monitor closely and make adjustments to medical management as needed.    Portions of this note dictated using EMR integrated voice recognition software, and may be subject to voice recognition errors not corrected at proofreading. Please contact writer for clarification if needed.   _____________________________________________________________________    Malnutrition Status:  Nutrition consulted. Most recent weight and BMI  monitored-     Measurements:  Wt Readings from Last 1 Encounters:   04/19/25 84.7 kg (186 lb 11.7 oz)   Body mass index is 32.05 kg/m².    Patient has been screened and assessed by RD.    Malnutrition Type:  Context: acute illness or injury  Level: other (see comments) (Does not meet criteria)    Malnutrition Characteristic Summary:       Interventions/Recommendations (treatment strategy):        Scheduled Med:   albuterol-ipratropium  3 mL Nebulization Q6H    ARIPiprazole  5 mg Per OG tube Daily    aspirin  81 mg Per OG tube Daily    atorvastatin  20 mg Oral Daily    divalproex  125 mg Oral QHS    fenofibrate  145 mg Oral Daily    folic acid  1 mg Oral Daily    furosemide (LASIX) injection  20 mg Intravenous Q6H    insulin glargine U-100  20 Units Subcutaneous QHS    levothyroxine  25 mcg Oral Before breakfast    sodium bicarbonate  650 mg Oral TID    tamsulosin  0.8 mg Oral Daily    ursodioL  300 mg Oral TID    vortioxetine  1 tablet Oral Daily      Continuous Infusions:     PRN Meds:    Current Facility-Administered Medications:     acetaminophen, 650 mg, Oral, Q4H PRN    dextrose 50%, 12.5 g, Intravenous, PRN    glucagon (human recombinant), 1 mg, Intramuscular, PRN    hydrALAZINE, 10 mg, Intravenous, Q4H PRN    insulin aspart U-100, 0-5 Units, Subcutaneous, Q6H PRN    senna, 8.6 mg, Oral, Daily PRN    sodium chloride 0.9%, 10 mL, Intravenous, PRN    sodium chloride 0.9%, 3 mL, Nebulization, PRN     Radiology:  I have personally reviewed the following imaging and agree with the radiologist.     CTA Chest Non-Coronary (PE Studies)  Narrative: EXAMINATION:  CTA CHEST NON CORONARY (PE STUDIES)    CLINICAL HISTORY:  Pulmonary embolism (PE) suspected, unknown D-dimer;complete heart block.  Tachypnea.    TECHNIQUE:  Helical acquisition through the chest with IV contrast targeting the pulmonary arteries. Multiplanar and 3D MIP reconstructed images were provided for review.   mGycm. Automatic exposure control,  adjustment of mA/kV or iterative reconstruction technique was used to reduce radiation.    COMPARISON:  No prior chest CT.    FINDINGS:  There is good opacification of the pulmonary arterial tree. No pulmonary embolus seen.  Some pulmonary arteries are obscured by motion.  There is no aortic dissection.    There is no mediastinal, hilar or axillary lymphadenopathy by size criteria.    Heart is normal in size. No pericardial effusion.    There is a small left pleural effusion.  There is bilateral dependent consolidation, left greater than right.    Suspect gallstones.  There is a small hiatal hernia.  Normal adrenals.  No acute osseous findings.  Impression: 1. Negative for pulmonary thromboembolic disease.  2. Bilateral dependent consolidation, left greater than right.  This at least partially relates to atelectasis but question superimposed infection.  3. Small left pleural effusion.    Electronically signed by: Sudeep Edgar  Date:    04/19/2025  Time:    08:27      Jayden Staton MD  Department of Hospital Medicine   [facility]   04/21/2025

## 2025-04-21 NOTE — PT/OT/SLP PROGRESS
Occupational Therapy   Treatment    Name: Bacilio Arguelles  MRN: [id number]  Admitting Diagnosis:  Heart block  4 Days Post-Op    Recommendations:     Recommended therapy intensity at discharge: Moderate Intensity Therapy   Discharge Equipment Recommendations:  to be determined by next level of care  Barriers to discharge:  Other (Comment) (medical needs)    Assessment:     Bacilio Arguelles is a 49 y.o. male with a medical diagnosis of cardiac arrest; syncope due to high grade AV block s/p TVP and now s/p leadless PPM. Of note, pt with PMH of seizure, psychological disorder and intellectual disability.  He presents with the following performance deficits affecting function: weakness, impaired cognition, impaired endurance, impaired self care skills, impaired functional mobility, gait instability, impaired balance, decreased safety awareness, decreased lower extremity function, decreased upper extremity function, edema.     Pt required maximal encouragement to participate this date, agreeable to sit in chair for breakfast. Pt with much improved mobility this date, able to transfer to bedside chair with min A using RW. Pt set up appropriately with breakfast and able to feed self. Agreeable to sit in chair for 1 hour, communicated with RN and PTA for return to bed.     Rehab Prognosis:  Good; patient would benefit from acute skilled OT services to address these deficits and reach maximum level of function.       Plan:     Patient to be seen 5 x/week to address the above listed problems via self-care/home management, therapeutic exercises  Plan of Care Expires: 05/19/25  Plan of Care Reviewed with: patient    Subjective     Pain/Comfort:  Pain Rating 1: 0/10    Objective:     Communicated with: RN prior to session.  Patient found supine with pulse ox (continuous), telemetry, peripheral IV, martinez catheter, oxygen upon OT entry to room.    General Precautions: Standard, fall    Orthopedic Precautions:N/A  Braces:  N/A  Respiratory Status: Nasal cannula, flow 1 L/min  Vital Signs: HR:  mainly, upon sitting EOB noted briefly 170 then lowered to 130 and quickly back to WNL. RN present and aware.   Sp02: 93%     Occupational Performance:     Bed Mobility:    Patient completed Supine to Sit with minimum assistance     Functional Mobility/Transfers:  Patient completed Sit <> Stand Transfer with minimum assistance  with  rolling walker   Patient completed Bed <> Chair Transfer using Step Transfer technique with minimum assistance with rolling walker  Functional Mobility: pt able to take steps to bedside chair with min A using RW    Activities of Daily Living:  Feeding:  pt required set up A for feeding (ie. Opening containers, cutting food, opening packets and fixing coffee) ; pt remains with limited ROM due to edema but able to self feed     Therapeutic Positioning    OT interventions performed during the course of today's session in an effort to prevent and/or reduce acquired pressure injuries:   Education was provided on benefits of and recommendations for therapeutic positioning  Therapeutic positioning was provided at the conclusion of session to offload all bony prominences for the prevention and/or reduction of pressure injuries    Skin assessment: visible skin intact    [facility] 6 Click ADL: 15    Patient Education:  Patient provided with verbal education education regarding OT role/goals/POC, fall prevention, and safety awareness.  Understanding was verbalized, however additional teaching warranted.    Patient left up in chair with all lines intact, call button in reach, chair alarm on, geomat cushion, and RN notified.    GOALS:   Multidisciplinary Problems       Occupational Therapy Goals          Problem: Occupational Therapy    Goal Priority Disciplines Outcome Interventions   Occupational Therapy Goal     OT, PT/OT Progressing    Description: Goals to be met by: 5/19/25     Patient will increase functional  independence with ADLs by performing:    Feeding TBD pending clearance for diet.   UE Dressing with Stand-by Assistance.  LE Dressing with Stand-by Assistance.  Grooming while standing at sink with Stand-by Assistance.  Toileting from toilet with Contact Guard Assistance for hygiene and clothing management.   Toilet transfer to toilet with Contact Guard Assistance.  Increased functional strength to 4/5 in BUEs through therapeutic exercise.                       Time Tracking:     OT Date of Treatment: 04/21/25  OT Start Time: 0943  OT Stop Time: 1007  OT Total Time (min): 24 min    Billable Minutes:Self Care/Home Management 24 mins    OT/MATHEUS: OT     Number of MATHEUS visits since last OT visit: 1    4/21/2025

## 2025-04-21 NOTE — PT/OT/SLP PROGRESS
Physical Therapy Treatment    Patient Name:  Bacilio Arguelles   MRN:  [id number]    Recommendations:     Discharge therapy intensity: Moderate Intensity Therapy   Discharge Equipment Recommendations: to be determined by next level of care  Barriers to discharge: Impaired mobility and Ongoing medical needs    Assessment:     Bacilio Arguelles is a 49 y.o. male admitted with a medical diagnosis of cardiac arrest; syncope due to high grade AV block s/p TVP and now s/p leadless PPM. Of note, pt with PMH of seizure, psychological disorder and intellectual disability.  He presents with the following impairments/functional limitations: weakness, gait instability, impaired endurance, impaired balance, impaired self care skills, impaired functional mobility, decreased safety awareness, impaired cardiopulmonary response to activity.    Rehab Prognosis: Good; patient would benefit from acute skilled PT services to address these deficits and reach maximum level of function.    Recent Surgery: Procedure(s) (LRB):  [id number] (N/A) 4 Days Post-Op    Plan:     During this hospitalization, patient would benefit from acute PT services 5 x/week to address the identified rehab impairments via gait training, therapeutic activities, therapeutic exercises and progress toward the following goals:    Plan of Care Expires:  05/18/25    Subjective     Chief Complaint: none expressed  Patient/Family Comments/goals: wants to go back to bed  Pain/Comfort:  Pain Rating 1: 0/10      Objective:     Communicated with pts nurse prior to session.  Patient found up in chair with blood pressure cuff, martinez catheter, pulse ox (continuous), telemetry, SCD, oxygen, peripheral IV upon PT entry to room.     General Precautions: Standard, fall  Orthopedic Precautions: N/A  Braces: N/A  Respiratory Status: Room air  Blood Pressure: seated 102/52, standing 113/73  Skin Integrity: Visible skin intact      Functional Mobility:  Bed  Mobility:     Rolling Left:  contact guard assistance  Rolling Right: contact guard assistance and to adjust pads  Bridging: contact guard assistance and to adjust position in supine.  Pt able clear buttocks and shift wt.   Sit to Supine: contact guard assistance and minimum assistance  Transfers:     Sit to Stand:  minimum assistance and cues to place hands on arm rest with rolling walker  Bed to Chair: contact guard assistance with  rolling walker  using  Step Transfer  Gait: the pt ambulated 14 ft w/ RW, negotiating obstacles requiring verbal cues to make bigger turn to avoid hitting bed and to have room to turn as he approached to bedside.  Attempted to perform LE exercises sitting EOB and supine, difficulty understanding, stating he just wanted to lay down.    Education:  Patient provided with verbal education and demonstrations education regarding fall prevention and safety awareness.  Additional teaching is warranted.     Patient left HOB elevated with all lines intact, call button in reach, and bed alarm on    GOALS:   Multidisciplinary Problems       Physical Therapy Goals          Problem: Physical Therapy    Goal Priority Disciplines Outcome Interventions   Physical Therapy Goal     PT, PT/OT Progressing    Description: Goals to be met by: 25     Patient will increase functional independence with mobility by performin. Supine to sit with Stand-by Assistance  2. Sit to supine with Stand-by Assistance  3. Sit to stand transfer with Stand-by Assistance  4. Gait  x 150 feet with Stand-by Assistance using Rolling Walker vs no AD.                          Time Tracking:     PT Received On: 25  PT Start Time: 1049     PT Stop Time: 1103  PT Total Time (min): 14 min     Billable Minutes: Therapeutic Activity 14 min    Treatment Type: Treatment  PT/PTA: PTA     Number of PTA visits since last PT visit: 2     2025

## 2025-04-22 VITALS
HEART RATE: 84 BPM | HEIGHT: 64 IN | TEMPERATURE: 98 F | SYSTOLIC BLOOD PRESSURE: 114 MMHG | WEIGHT: 166.38 LBS | RESPIRATION RATE: 28 BRPM | DIASTOLIC BLOOD PRESSURE: 73 MMHG | BODY MASS INDEX: 28.41 KG/M2 | OXYGEN SATURATION: 94 %

## 2025-04-22 LAB
ANION GAP SERPL CALC-SCNC: 9 MEQ/L
BUN SERPL-MCNC: 35.3 MG/DL (ref 8.9–20.6)
CALCIUM SERPL-MCNC: 8.4 MG/DL (ref 8.4–10.2)
CHLORIDE SERPL-SCNC: 100 MMOL/L (ref 98–107)
CO2 SERPL-SCNC: 29 MMOL/L (ref 22–29)
CREAT SERPL-MCNC: 1.57 MG/DL (ref 0.72–1.25)
CREAT/UREA NIT SERPL: 22
GFR SERPLBLD CREATININE-BSD FMLA CKD-EPI: 54 ML/MIN/1.73/M2
GLUCOSE SERPL-MCNC: 219 MG/DL (ref 74–100)
MAGNESIUM SERPL-MCNC: 1.7 MG/DL (ref 1.6–2.6)
PHOSPHATE SERPL-MCNC: 3.9 MG/DL (ref 2.3–4.7)
POCT GLUCOSE: 190 MG/DL (ref 70–110)
POCT GLUCOSE: 244 MG/DL (ref 70–110)
POCT GLUCOSE: 291 MG/DL (ref 70–110)
POTASSIUM SERPL-SCNC: 3.2 MMOL/L (ref 3.5–5.1)
SODIUM SERPL-SCNC: 138 MMOL/L (ref 136–145)

## 2025-04-22 PROCEDURE — 94760 N-INVAS EAR/PLS OXIMETRY 1: CPT

## 2025-04-22 PROCEDURE — 97116 GAIT TRAINING THERAPY: CPT | Mod: CQ

## 2025-04-22 PROCEDURE — 83735 ASSAY OF MAGNESIUM: CPT | Performed by: INTERNAL MEDICINE

## 2025-04-22 PROCEDURE — 25000003 PHARM REV CODE 250: Performed by: STUDENT IN AN ORGANIZED HEALTH CARE EDUCATION/TRAINING PROGRAM

## 2025-04-22 PROCEDURE — 97535 SELF CARE MNGMENT TRAINING: CPT

## 2025-04-22 PROCEDURE — 99900031 HC PATIENT EDUCATION (STAT)

## 2025-04-22 PROCEDURE — 94761 N-INVAS EAR/PLS OXIMETRY MLT: CPT

## 2025-04-22 PROCEDURE — 94640 AIRWAY INHALATION TREATMENT: CPT

## 2025-04-22 PROCEDURE — 99900035 HC TECH TIME PER 15 MIN (STAT)

## 2025-04-22 PROCEDURE — 27000221 HC OXYGEN, UP TO 24 HOURS

## 2025-04-22 PROCEDURE — 63600175 PHARM REV CODE 636 W HCPCS: Performed by: INTERNAL MEDICINE

## 2025-04-22 PROCEDURE — 25000242 PHARM REV CODE 250 ALT 637 W/ HCPCS: Performed by: HOSPITALIST

## 2025-04-22 PROCEDURE — 25000003 PHARM REV CODE 250: Performed by: INTERNAL MEDICINE

## 2025-04-22 PROCEDURE — 63600175 PHARM REV CODE 636 W HCPCS: Performed by: STUDENT IN AN ORGANIZED HEALTH CARE EDUCATION/TRAINING PROGRAM

## 2025-04-22 PROCEDURE — 84100 ASSAY OF PHOSPHORUS: CPT | Performed by: INTERNAL MEDICINE

## 2025-04-22 PROCEDURE — 80048 BASIC METABOLIC PNL TOTAL CA: CPT | Performed by: STUDENT IN AN ORGANIZED HEALTH CARE EDUCATION/TRAINING PROGRAM

## 2025-04-22 PROCEDURE — 36415 COLL VENOUS BLD VENIPUNCTURE: CPT | Performed by: STUDENT IN AN ORGANIZED HEALTH CARE EDUCATION/TRAINING PROGRAM

## 2025-04-22 PROCEDURE — 97530 THERAPEUTIC ACTIVITIES: CPT | Mod: CQ

## 2025-04-22 RX ORDER — TRAZODONE HYDROCHLORIDE 50 MG/1
50 TABLET ORAL NIGHTLY
Qty: 30 TABLET | Refills: 11 | Status: SHIPPED | OUTPATIENT
Start: 2025-04-22 | End: 2025-04-22 | Stop reason: HOSPADM

## 2025-04-22 RX ORDER — FUROSEMIDE 20 MG/1
20 TABLET ORAL
Qty: 15 TABLET | Refills: 11 | Status: SHIPPED | OUTPATIENT
Start: 2025-04-22 | End: 2026-04-22

## 2025-04-22 RX ORDER — POTASSIUM CHLORIDE 20 MEQ/1
60 TABLET, EXTENDED RELEASE ORAL ONCE
Status: COMPLETED | OUTPATIENT
Start: 2025-04-22 | End: 2025-04-22

## 2025-04-22 RX ORDER — TRAZODONE HYDROCHLORIDE 50 MG/1
50 TABLET ORAL NIGHTLY
COMMUNITY

## 2025-04-22 RX ORDER — TAMSULOSIN HYDROCHLORIDE 0.4 MG/1
0.8 CAPSULE ORAL DAILY
Qty: 60 CAPSULE | Refills: 11 | Status: SHIPPED | OUTPATIENT
Start: 2025-04-23 | End: 2026-04-23

## 2025-04-22 RX ORDER — DIVALPROEX SODIUM 125 MG/1
500 TABLET, DELAYED RELEASE ORAL 2 TIMES DAILY
Qty: 10 TABLET | Refills: 1 | Status: SHIPPED | OUTPATIENT
Start: 2025-04-22 | End: 2025-04-22 | Stop reason: HOSPADM

## 2025-04-22 RX ORDER — ARIPIPRAZOLE 5 MG/1
10 TABLET ORAL 2 TIMES DAILY
Qty: 30 TABLET | Refills: 1 | Status: SHIPPED | OUTPATIENT
Start: 2025-04-22 | End: 2025-04-22 | Stop reason: HOSPADM

## 2025-04-22 RX ORDER — DIVALPROEX SODIUM 500 MG/1
500 TABLET, FILM COATED, EXTENDED RELEASE ORAL 2 TIMES DAILY
COMMUNITY

## 2025-04-22 RX ORDER — PALIPERIDONE PALMITATE 117 MG/.75ML
234 INJECTION INTRAMUSCULAR
Qty: 5 ML | Refills: 1 | Status: SHIPPED | OUTPATIENT
Start: 2025-04-22 | End: 2025-04-22 | Stop reason: HOSPADM

## 2025-04-22 RX ORDER — ARIPIPRAZOLE 10 MG/1
20 TABLET ORAL 2 TIMES DAILY
COMMUNITY

## 2025-04-22 RX ORDER — GLIPIZIDE 10 MG/1
5 TABLET, FILM COATED, EXTENDED RELEASE ORAL
COMMUNITY

## 2025-04-22 RX ADMIN — URSODIOL 300 MG: 300 CAPSULE ORAL at 09:04

## 2025-04-22 RX ADMIN — FOLIC ACID 1 MG: 1 TABLET ORAL at 09:04

## 2025-04-22 RX ADMIN — ATORVASTATIN CALCIUM 20 MG: 10 TABLET, FILM COATED ORAL at 09:04

## 2025-04-22 RX ADMIN — URSODIOL 300 MG: 300 CAPSULE ORAL at 02:04

## 2025-04-22 RX ADMIN — FENOFIBRATE 145 MG: 145 TABLET, FILM COATED ORAL at 09:04

## 2025-04-22 RX ADMIN — IPRATROPIUM BROMIDE AND ALBUTEROL SULFATE 3 ML: 2.5; .5 SOLUTION RESPIRATORY (INHALATION) at 12:04

## 2025-04-22 RX ADMIN — INSULIN ASPART 3 UNITS: 100 INJECTION, SOLUTION INTRAVENOUS; SUBCUTANEOUS at 02:04

## 2025-04-22 RX ADMIN — IPRATROPIUM BROMIDE AND ALBUTEROL SULFATE 3 ML: 2.5; .5 SOLUTION RESPIRATORY (INHALATION) at 08:04

## 2025-04-22 RX ADMIN — FUROSEMIDE 20 MG: 10 INJECTION, SOLUTION INTRAMUSCULAR; INTRAVENOUS at 12:04

## 2025-04-22 RX ADMIN — SODIUM BICARBONATE 650 MG TABLET 650 MG: at 09:04

## 2025-04-22 RX ADMIN — FUROSEMIDE 20 MG: 10 INJECTION, SOLUTION INTRAMUSCULAR; INTRAVENOUS at 05:04

## 2025-04-22 RX ADMIN — ARIPIPRAZOLE 5 MG: 5 TABLET ORAL at 09:04

## 2025-04-22 RX ADMIN — ASPIRIN 81 MG CHEWABLE TABLET 81 MG: 81 TABLET CHEWABLE at 09:04

## 2025-04-22 RX ADMIN — FUROSEMIDE 20 MG: 10 INJECTION, SOLUTION INTRAMUSCULAR; INTRAVENOUS at 11:04

## 2025-04-22 RX ADMIN — TAMSULOSIN HYDROCHLORIDE 0.8 MG: 0.4 CAPSULE ORAL at 09:04

## 2025-04-22 RX ADMIN — LEVOTHYROXINE SODIUM 25 MCG: 0.03 TABLET ORAL at 05:04

## 2025-04-22 RX ADMIN — FUROSEMIDE 20 MG: 10 INJECTION, SOLUTION INTRAMUSCULAR; INTRAVENOUS at 06:04

## 2025-04-22 RX ADMIN — IPRATROPIUM BROMIDE AND ALBUTEROL SULFATE 3 ML: 2.5; .5 SOLUTION RESPIRATORY (INHALATION) at 01:04

## 2025-04-22 RX ADMIN — POTASSIUM CHLORIDE 60 MEQ: 1500 TABLET, EXTENDED RELEASE ORAL at 02:04

## 2025-04-22 NOTE — PT/OT/SLP PROGRESS
Occupational Therapy   Treatment    Name: Bacilio Arguelles  MRN: [id number]  Admitting Diagnosis:  Heart block  5 Days Post-Op    Recommendations:     Recommended therapy intensity at discharge: Moderate Intensity Therapy   Discharge Equipment Recommendations:  to be determined by next level of care  Barriers to discharge:  None    Assessment:     Bacilio Arguelles is a 49 y.o. male with a medical diagnosis of cardiac arrest; syncope due to high grade AV block s/p TVP and now s/p leadless PPM. Of note, pt with PMH of seizure, psychological disorder and intellectual disability.  He presents with the following performance deficits affecting function: weakness, impaired cognition, impaired endurance, decreased upper extremity function, impaired functional mobility, impaired self care skills, gait instability, decreased safety awareness, impaired balance.     Rehab Prognosis:  Good; patient would benefit from acute skilled OT services to address these deficits and reach maximum level of function.       Plan:     Patient to be seen 5 x/week to address the above listed problems via self-care/home management, therapeutic exercises  Plan of Care Expires: 05/19/25  Plan of Care Reviewed with: patient    Subjective     Pain/Comfort:  Pain Rating 1: 0/10    Objective:     Communicated with: RN prior to session.  Patient found HOB elevated with pulse ox (continuous), telemetry, oxygen, peripheral IV, martinez catheter upon OT entry to room.    General Precautions: Standard, fall    Orthopedic Precautions:N/A  Braces: N/A  Respiratory Status: Nasal cannula, flow 2 L/min  Vital Signs: Blood Pressure: 117/73  HR:   Sp02: 97%     Occupational Performance:     Bed Mobility:    Patient completed Supine to Sit with contact guard assistance     Functional Mobility/Transfers:  Patient completed Sit <> Stand Transfer with contact guard assistance  with  rolling walker   Patient completed Bed <> Chair Transfer using Step Transfer  technique with contact guard assistance with rolling walker  Functional Mobility: CGA-min A to bathroom sink and back to chair, 10 ft and 6 ft using RW    Activities of Daily Living:  Grooming: contact guard assistance for oral hygiene and face washing standing at sink, max A for washing and combing hair standing at sink    Therapeutic Positioning    OT interventions performed during the course of today's session in an effort to prevent and/or reduce acquired pressure injuries:   Education was provided on benefits of and recommendations for therapeutic positioning  Therapeutic positioning was provided at the conclusion of session to offload all bony prominences for the prevention and/or reduction of pressure injuries    [facility] 6 Click ADL: 16    Patient Education:  Patient provided with verbal education education regarding OT role/goals/POC, fall prevention, and safety awareness.  Understanding was verbalized, however additional teaching warranted.    Patient left up in chair with all lines intact, call button in reach, chair alarm on, geomat cushion, and RN notified.    GOALS:   Multidisciplinary Problems       Occupational Therapy Goals          Problem: Occupational Therapy    Goal Priority Disciplines Outcome Interventions   Occupational Therapy Goal     OT, PT/OT Progressing    Description: Goals to be met by: 5/19/25     Patient will increase functional independence with ADLs by performing:    Feeding TBD pending clearance for diet.   UE Dressing with Stand-by Assistance.  LE Dressing with Stand-by Assistance.  Grooming while standing at sink with Stand-by Assistance.  Toileting from toilet with Contact Guard Assistance for hygiene and clothing management.   Toilet transfer to toilet with Contact Guard Assistance.  Increased functional strength to 4/5 in BUEs through therapeutic exercise.                       Time Tracking:     OT Date of Treatment: 04/22/25  OT Start Time: 1017  OT Stop Time: 1048  OT Total  Time (min): 31 min    Billable Minutes:Self Care/Home Management 31 mins    OT/MATHEUS: OT     Number of MATHEUS visits since last OT visit: 2    4/22/2025

## 2025-04-22 NOTE — PT/OT/SLP PROGRESS
Physical Therapy Treatment    Patient Name:  Bacilio Arguelles   MRN:  [id number]    Recommendations:     Discharge therapy intensity: Moderate Intensity Therapy   Discharge Equipment Recommendations: to be determined by next level of care  Barriers to discharge: Impaired mobility    Assessment:     Bacilio Arguelles is a 49 y.o. male admitted with a medical diagnosis of cardiac arrest; syncope due to high grade AV block s/p TVP and now s/p leadless PPM. Of note, pt with PMH of seizure, psychological disorder and intellectual disability.  He presents with the following impairments/functional limitations: weakness, gait instability, impaired endurance, impaired balance, impaired self care skills, impaired functional mobility, decreased safety awareness, impaired cardiopulmonary response to activity.    Rehab Prognosis: Good; patient would benefit from acute skilled PT services to address these deficits and reach maximum level of function.    Recent Surgery: Procedure(s) (LRB):  [id number] (N/A) 5 Days Post-Op    Plan:     During this hospitalization, patient would benefit from acute PT services 5 x/week to address the identified rehab impairments via gait training, therapeutic activities, therapeutic exercises and progress toward the following goals:    Plan of Care Expires:  05/18/25    Subjective     Chief Complaint: none  Patient/Family Comments/goals: to go home  Pain/Comfort:  Pain Rating 1: 0/10      Objective:     Communicated with pts nurse prior to session.  Patient found HOB elevated with blood pressure cuff, martinez catheter, pulse ox (continuous), telemetry, SCD, oxygen, peripheral IV upon PT entry to room.     General Precautions: Standard, fall  Orthopedic Precautions: N/A  Braces: N/A  Respiratory Status: Nasal cannula, flow 1 L/min  Blood Pressure: 117/73  Skin Integrity: Visible skin intact      Functional Mobility:  Bed Mobility:     Supine to Sit: minimum assistance  Transfers:      Sit to Stand:  minimum assistance with rolling walker  Bed to Chair: contact guard assistance with  rolling walker  using  Step Transfer  Gait: Pt ambulated 15 ft bed to bathroom and 10 ft to bedside chair w/ RW CGA  Pt is slow for all tasks and requires encouragement to perform    Therapeutic Activities/Exercises:  Pt performed standing tasks at sink, washing face, brushing teeth and cleaning/combing hair, standing ~ 5+ min w/o difficulty and no LOB    Education:  Patient provided with verbal education and demonstrations education regarding fall prevention, safety awareness, and ambulation .  Additional teaching is warranted.     Patient left up in chair with all lines intact, call button in reach, chair alarm on, and nurse notified    GOALS:   Multidisciplinary Problems       Physical Therapy Goals          Problem: Physical Therapy    Goal Priority Disciplines Outcome Interventions   Physical Therapy Goal     PT, PT/OT Progressing    Description: Goals to be met by: 25     Patient will increase functional independence with mobility by performin. Supine to sit with Stand-by Assistance  2. Sit to supine with Stand-by Assistance  3. Sit to stand transfer with Stand-by Assistance  4. Gait  x 150 feet with Stand-by Assistance using Rolling Walker vs no AD.                          Time Tracking:     PT Received On: 25  PT Start Time: 1018     PT Stop Time: 1046  PT Total Time (min): 28 min     Billable Minutes: Gait Training 12 min and Therapeutic Activity 16 min    Treatment Type: Treatment  PT/PTA: PTA     Number of PTA visits since last PT visit: 3     2025

## 2025-04-22 NOTE — DISCHARGE SUMMARY
[facility] Hospital Medicine Discharge Summary    Admit Date: 4/15/2025  Discharge Date and Time: 4/22/202512:17 PM  Admitting Physician:  Team  Discharging Physician: Jayden Staton MD.  Primary Care Physician: Malik Zavala MD  Consults: Cardiology and ICU    Discharge Diagnoses:  Syncope  Fall and head trauma 4/13  Bradycardia  Episode of asystole and cardiac arrest with ROSC after 1 round of CPR  Third-degree AV block, s/p transvenous pacemaker.  CARMEL on CKD  Generalized anasarca  Other medical history includes type 2 diabetes, hypothyroidism, seizure disorder, intellectual disability, disruptive disorder, HLD, BPH       Hospital Course:   Bacilio Arguelles is a 49 y.o. male who  has a past medical history of Anxiety disorder, unspecified, Depression, DM (diabetes mellitus), type 2, GERD (gastroesophageal reflux disease), HLD (hyperlipidemia), Hypothyroidism, unspecified, Intellectual disability, Mood disorder, and Seizure disorder..      The patient presented to [facility] on 4/15/2025 from an outside facility as a transfer after going into cardiac arrest with ROSC after 2 minutes.  Patient was admitted to the ICU as he was brought in intubated.  History as per nurse mentions a syncopal episode which led to a fall and head trauma on 04/13.  At the time of EMS arrival heart rate was found to be 20 to 30s at .  Shortly after patient was found to be in asystole and was coded for 1 round.  He was intubated and transferred into the ICU at that facility.  Patient was placed on temporary pacing.  Echo showed a EF of 30%.  Patient was transferred to our facility  to have a permanent pacemaker however he started developing fevers so pacemaker placement got delayed.  Cultures found to be negative on 04/16.  Dual-chamber leadless ppm was placed on 04/17.  Extubated 4/18, however he continued to have fevers.  Repeat cultures taken,  Vanc and Zosyn started.  On 04/19 CTA  chest was found to be negative for PE.     Today patient was found to be stable to be downgraded to hospital medicine.  Patient has been afebrile for the last 24 hours.  Requiring 2 L nasal cannula and satting at 95%.  Patient has been mobile, as per nurse and can perform basic ADLs on his own. Patient mentions that he needs help with showering and shaving but is able to use the toilet on his own.   On exam, generalized anasarca is noted.       Stop antibiotics, continue home meds, continue PT/OT, titrate oxygen requirements, and start Lasix 20 mg q.6 hours.  Daily labs. Continue to monitor.  Patient was kept for 2 days on the floor as he was extremely fluid overloaded and was experiencing generalized anasarca.  Patient was given Lasix 20 mg q.6 hours for 48 hours with a significant improvement in his generalized anasarca.  Creatinine has slightly worsened however he is very sensitive to Lasix as per previous creatinine on file.  As mentioned previously, patient only takes Lasix as needed when his water weight is noted to be higher than usual.  At this time I feel safe with discharging patient back to home.  Case management consulted to discharge back to residence.  Pt was seen and examined on the day of discharge  Vitals:  VITAL SIGNS: 24 HRS MIN & MAX LAST   Temp  Min: 98.1 °F (36.7 °C)  Max: 98.6 °F (37 °C) 98.2 °F (36.8 °C)   BP  Min: 95/63  Max: 124/79 117/73   Pulse  Min: 83  Max: 108  108   Resp  Min: 16  Max: 20 20   SpO2  Min: 91 %  Max: 97 % 96 %       Physical Exam:  Eyes: Conjunctivae nl. No scleral icterus.  ENT: Mucous membranes are moist. Oropharynx is clear.  Neck: Supple. No lymphadenopathy.  Transvenous pacemaker in the right internal jugular  Cardiovascular: Regular rate and rhythm. No murmurs, rubs, or gallops. Distal pulses are 2+ and symmetric.  Pulmonary/Chest: No respiratory distress. Clear to auscultation bilaterally. No wheezing, rales, or rhonchi.  Abdominal: Soft. Non-distended. No TTP.  No rebound, guarding, or rigidity.   Musculoskeletal:  anasarca  Psych/mental status: Appropriate mood and affect.     Procedures Performed: No admission procedures for hospital encounter.     Significant Diagnostic Studies: See Full reports for all details    Recent Labs   Lab 04/18/25  1509 04/20/25  0445 04/21/25  0501   WBC 6.23 6.17 5.36   RBC 3.64* 3.08* 3.05*   HGB 11.9* 10.1* 9.9*   HCT 37.3* 30.9* 31.4*   .5* 100.3* 103.0*   MCH 32.7* 32.8* 32.5*   MCHC 31.9* 32.7* 31.5*   RDW 15.3 15.1 14.7   * 127* 167   MPV 9.0 10.2 8.9       Recent Labs   Lab 04/15/25  1547 04/16/25  0317 04/18/25  1509 04/18/25  1714 04/18/25  1829 04/18/25  2130 04/19/25  0442 04/19/25  1823 04/20/25  0445 04/21/25  0501 04/22/25  0726   NA  --    < > 141  --   --    < > 138  --  136 140 138   K  --    < > 4.4  --   --    < > 4.0  --  5.2* 3.5 3.2*   CL  --    < > 110*  --   --    < > 110*  --  110* 106 100   CO2  --    < > 13*  --   --    < > 20*  --  19* 25 29   BUN  --    < > 48.1*  --   --    < > 39.7*  --  35.8* 32.3* 35.3*   CREATININE  --    < > 1.40*  --   --    < > 1.49*   < > 1.19 1.41* 1.57*   CALCIUM  --    < > 8.4  --   --    < > 8.7  --  8.1* 8.4 8.4   PH 7.330*  --   --  7.390 7.390  --   --   --   --   --   --    MG  --    < >  --   --   --   --  1.80  --  2.30 1.80 1.70   ALBUMIN  --    < > 2.1*  --   --   --  1.7*  --  1.8*  --   --    ALKPHOS  --    < > 90  --   --   --  89  --  85  --   --    ALT  --    < > 27  --   --   --  30  --  37  --   --    AST  --    < > 65*  --   --   --  61*  --  63*  --   --    BILITOT  --    < > 1.2  --   --   --  1.9*  --  2.2*  --   --     < > = values in this interval not displayed.        Microbiology Results (last 7 days)       Procedure Component Value Units Date/Time    Blood Culture [[id number]]  (Normal) Collected: 04/18/25 1645    Order Status: Completed Specimen: Blood Updated: 04/21/25 1808     Blood Culture No Growth At 72 Hours    Blood Culture [[id number]]   (Normal) Collected: 04/18/25 1645    Order Status: Completed Specimen: Blood Updated: 04/21/25 1802     Blood Culture No Growth At 72 Hours    Blood Culture [[id number]]  (Normal) Collected: 04/15/25 1637    Order Status: Completed Specimen: Blood Updated: 04/20/25 1802     Blood Culture No Growth at 5 days    Blood Culture [[id number]]  (Normal) Collected: 04/15/25 1637    Order Status: Completed Specimen: Blood Updated: 04/20/25 1802     Blood Culture No Growth at 5 days    Respiratory Culture [[id number]] Collected: 04/18/25 1705    Order Status: Completed Specimen: Sputum Updated: 04/20/25 1039     Respiratory Culture Normal respiratory katarina     GRAM STAIN Quality 2+      Many Gram positive cocci      Moderate Gram Positive Rods    Urine culture [[id number]] Collected: 04/15/25 1500    Order Status: Completed Specimen: Urine Updated: 04/17/25 0824     Urine Culture No Growth             CTA Chest Non-Coronary (PE Studies)  Narrative: EXAMINATION:  CTA CHEST NON CORONARY (PE STUDIES)    CLINICAL HISTORY:  Pulmonary embolism (PE) suspected, unknown D-dimer;complete heart block.  Tachypnea.    TECHNIQUE:  Helical acquisition through the chest with IV contrast targeting the pulmonary arteries. Multiplanar and 3D MIP reconstructed images were provided for review.   mGycm. Automatic exposure control, adjustment of mA/kV or iterative reconstruction technique was used to reduce radiation.    COMPARISON:  No prior chest CT.    FINDINGS:  There is good opacification of the pulmonary arterial tree. No pulmonary embolus seen.  Some pulmonary arteries are obscured by motion.  There is no aortic dissection.    There is no mediastinal, hilar or axillary lymphadenopathy by size criteria.    Heart is normal in size. No pericardial effusion.    There is a small left pleural effusion.  There is bilateral dependent consolidation, left greater than right.    Suspect gallstones.  There is a small hiatal hernia.  Normal  adrenals.  No acute osseous findings.  Impression: 1. Negative for pulmonary thromboembolic disease.  2. Bilateral dependent consolidation, left greater than right.  This at least partially relates to atelectasis but question superimposed infection.  3. Small left pleural effusion.    Electronically signed by: Sudeep Edgar  Date:    04/19/2025  Time:    08:27         Medication List        CONTINUE taking these medications      alfuzosin 10 mg Tb24  Commonly known as: UROXATRAL     ARIPiprazole 5 MG Tab  Commonly known as: ABILIFY     aspirin 81 MG EC tablet  Commonly known as: ECOTRIN     atorvastatin 20 MG tablet  Commonly known as: LIPITOR     DAYVIGO 5 mg Tab  Generic drug: lemborexant     divalproex 125 MG EC tablet  Commonly known as: DEPAKOTE     fenofibrate 145 MG tablet  Commonly known as: TRICOR     folic acid 1 MG tablet  Commonly known as: FOLVITE  Take 1 tablet (1 mg total) by mouth once daily.     furosemide 40 MG tablet  Commonly known as: LASIX  Take 0.5 tablets (20 mg total) by mouth daily as needed (Swelling, shortness of breath, rapid weight gain of 2 lb overnight).     glipiZIDE 5 MG Tr24  Take 1 tablet (5 mg total) by mouth daily with breakfast.     INVEGA SUSTENNA 117 mg/0.75 mL Syrg injection  Generic drug: paliperidone palmitate     levothyroxine 25 MCG tablet  Commonly known as: SYNTHROID     pantoprazole 40 MG tablet  Commonly known as: PROTONIX     sodium bicarbonate 650 MG tablet  Take 1 tablet (650 mg total) by mouth 3 (three) times daily. for 7 days     TRINTELLIX 20 mg Tab  Generic drug: vortioxetine     ursodioL 300 mg capsule  Commonly known as: ACTIGALL            STOP taking these medications      PROPRANOLOL ORAL               Explained in detail to the patient about the discharge plan, medications, and follow-up visits. Pt understands and agrees with the treatment plan  Discharge Disposition: Home or Self Care   Discharged Condition: stable  Diet-   Dietary Orders (From  admission, onward)       Start     Ordered    04/20/25 0859  Diet Consistent Carbohydrate 2000 Calories (up to 75 gm per meal); Heart Healthy  Diet effective now        Comments: Supervision with meals   Question Answer Comment   Total calories / carbs: 2000 Calories (up to 75 gm per meal)    Diet Modifier: Heart Healthy        04/20/25 0858                   Medications Per DC med rec  Activities as tolerated   Follow-up Information       Son Hernandez MD Follow up in 1 week(s).    Specialties: Cardiology, Electrophysiology  Why: Enrollin Pacer Clinic, F/U with Dr. Hernandez as Directed.  Contact information:  [address] [place] [id number]  [phone]               Malik Zavala MD Follow up.    Specialty: Family Medicine  Contact information:  [address]  [address]  [place] [id number] [phone]                           For further questions contact hospitalist office    Discharge time 33 minutes    For worsening symptoms, chest pain, shortness of breath, increased abdominal pain, high grade fever, stroke or stroke like symptoms, immediately go to the nearest Emergency Room or call 911 as soon as possible.      Jayden Martínez M.D, on 4/22/2025. at 12:17 PM.

## 2025-04-22 NOTE — PLAN OF CARE
04/22/25 1305   Final Note   Assessment Type Final Discharge Note   Anticipated Discharge Disposition Home  (group home)   Hospital Resources/Appts/Education Provided Appointments scheduled and added to AVS   Post-Acute Status   Discharge Delays None known at this time

## 2025-04-23 LAB
BACTERIA BLD CULT: NORMAL
BACTERIA BLD CULT: NORMAL
POCT GLUCOSE: 151 MG/DL (ref 70–110)

## 2025-05-07 NOTE — PHYSICIAN QUERY
Please clarify the Type and Acuity of the heart failure diagnosis.     Other (please specify):  Chronic CHF